# Patient Record
Sex: MALE | Race: WHITE | NOT HISPANIC OR LATINO | Employment: FULL TIME | URBAN - METROPOLITAN AREA
[De-identification: names, ages, dates, MRNs, and addresses within clinical notes are randomized per-mention and may not be internally consistent; named-entity substitution may affect disease eponyms.]

---

## 2018-12-04 ENCOUNTER — OFFICE VISIT CONVERTED (OUTPATIENT)
Dept: FAMILY MEDICINE CLINIC | Facility: CLINIC | Age: 53
End: 2018-12-04
Attending: FAMILY MEDICINE

## 2018-12-04 ENCOUNTER — CONVERSION ENCOUNTER (OUTPATIENT)
Dept: FAMILY MEDICINE CLINIC | Facility: CLINIC | Age: 53
End: 2018-12-04

## 2019-03-05 ENCOUNTER — OFFICE VISIT CONVERTED (OUTPATIENT)
Dept: FAMILY MEDICINE CLINIC | Facility: CLINIC | Age: 54
End: 2019-03-05
Attending: FAMILY MEDICINE

## 2019-03-05 ENCOUNTER — CONVERSION ENCOUNTER (OUTPATIENT)
Dept: FAMILY MEDICINE CLINIC | Facility: CLINIC | Age: 54
End: 2019-03-05

## 2019-09-18 ENCOUNTER — OFFICE VISIT CONVERTED (OUTPATIENT)
Dept: FAMILY MEDICINE CLINIC | Facility: CLINIC | Age: 54
End: 2019-09-18
Attending: FAMILY MEDICINE

## 2019-12-17 ENCOUNTER — OFFICE VISIT CONVERTED (OUTPATIENT)
Dept: FAMILY MEDICINE CLINIC | Facility: CLINIC | Age: 54
End: 2019-12-17
Attending: FAMILY MEDICINE

## 2019-12-18 ENCOUNTER — HOSPITAL ENCOUNTER (OUTPATIENT)
Dept: FAMILY MEDICINE CLINIC | Facility: CLINIC | Age: 54
Discharge: HOME | End: 2019-12-18
Attending: FAMILY MEDICINE

## 2019-12-18 LAB
ALBUMIN SERPL-MCNC: 4.3 G/DL (ref 3.5–5)
ALBUMIN/GLOB SERPL: 1.7 {RATIO} (ref 1.4–2.6)
ALP SERPL-CCNC: 74 U/L (ref 56–119)
ALT SERPL-CCNC: 51 U/L (ref 10–40)
ANION GAP SERPL CALC-SCNC: 19 MMOL/L (ref 8–19)
APPEARANCE UR: CLEAR
AST SERPL-CCNC: 40 U/L (ref 15–50)
BASOPHILS # BLD AUTO: 0.04 10*3/UL (ref 0–0.2)
BASOPHILS NFR BLD AUTO: 0.5 % (ref 0–3)
BILIRUB SERPL-MCNC: 0.46 MG/DL (ref 0.2–1.3)
BILIRUB UR QL: NEGATIVE
BUN SERPL-MCNC: 14 MG/DL (ref 5–25)
BUN/CREAT SERPL: 12 {RATIO} (ref 6–20)
CALCIUM SERPL-MCNC: 9.4 MG/DL (ref 8.7–10.4)
CHLORIDE SERPL-SCNC: 106 MMOL/L (ref 99–111)
CHOLEST SERPL-MCNC: 138 MG/DL (ref 107–200)
CHOLEST/HDLC SERPL: 3 {RATIO} (ref 3–6)
COLOR UR: YELLOW
CONV ABS IMM GRAN: 0.03 10*3/UL (ref 0–0.2)
CONV CO2: 22 MMOL/L (ref 22–32)
CONV COLLECTION SOURCE (UA): NORMAL
CONV IMMATURE GRAN: 0.4 % (ref 0–1.8)
CONV TOTAL PROTEIN: 6.9 G/DL (ref 6.3–8.2)
CONV UROBILINOGEN IN URINE BY AUTOMATED TEST STRIP: 0.2 {EHRLICHU}/DL (ref 0.1–1)
CREAT UR-MCNC: 1.18 MG/DL (ref 0.7–1.2)
DEPRECATED RDW RBC AUTO: 44.7 FL (ref 35.1–43.9)
EOSINOPHIL # BLD AUTO: 0.14 10*3/UL (ref 0–0.7)
EOSINOPHIL # BLD AUTO: 1.8 % (ref 0–7)
ERYTHROCYTE [DISTWIDTH] IN BLOOD BY AUTOMATED COUNT: 13.5 % (ref 11.6–14.4)
EST. AVERAGE GLUCOSE BLD GHB EST-MCNC: 105 MG/DL
GFR SERPLBLD BASED ON 1.73 SQ M-ARVRAT: >60 ML/MIN/{1.73_M2}
GLOBULIN UR ELPH-MCNC: 2.6 G/DL (ref 2–3.5)
GLUCOSE SERPL-MCNC: 87 MG/DL (ref 70–99)
GLUCOSE UR QL: NEGATIVE MG/DL
HBA1C MFR BLD: 5.3 % (ref 3.5–5.7)
HCT VFR BLD AUTO: 44.4 % (ref 42–52)
HDLC SERPL-MCNC: 46 MG/DL (ref 40–60)
HGB BLD-MCNC: 14.9 G/DL (ref 14–18)
HGB UR QL STRIP: NEGATIVE
KETONES UR QL STRIP: NEGATIVE MG/DL
LDLC SERPL CALC-MCNC: 60 MG/DL (ref 70–100)
LEUKOCYTE ESTERASE UR QL STRIP: NEGATIVE
LYMPHOCYTES # BLD AUTO: 1.76 10*3/UL (ref 1–5)
LYMPHOCYTES NFR BLD AUTO: 22.1 % (ref 20–45)
MCH RBC QN AUTO: 30.3 PG (ref 27–31)
MCHC RBC AUTO-ENTMCNC: 33.6 G/DL (ref 33–37)
MCV RBC AUTO: 90.2 FL (ref 80–96)
MONOCYTES # BLD AUTO: 0.69 10*3/UL (ref 0.2–1.2)
MONOCYTES NFR BLD AUTO: 8.6 % (ref 3–10)
NEUTROPHILS # BLD AUTO: 5.32 10*3/UL (ref 2–8)
NEUTROPHILS NFR BLD AUTO: 66.6 % (ref 30–85)
NITRITE UR QL STRIP: NEGATIVE
NRBC CBCN: 0 % (ref 0–0.7)
OSMOLALITY SERPL CALC.SUM OF ELEC: 294 MOSM/KG (ref 273–304)
PH UR STRIP.AUTO: 5.5 [PH] (ref 5–8)
PLATELET # BLD AUTO: 236 10*3/UL (ref 130–400)
PMV BLD AUTO: 8.8 FL (ref 9.4–12.4)
POTASSIUM SERPL-SCNC: 4.5 MMOL/L (ref 3.5–5.3)
PROT UR QL: NEGATIVE MG/DL
PSA SERPL-MCNC: 3.3 NG/ML (ref 0–4)
RBC # BLD AUTO: 4.92 10*6/UL (ref 4.7–6.1)
SODIUM SERPL-SCNC: 142 MMOL/L (ref 135–147)
SP GR UR: 1.02 (ref 1–1.03)
TESTOST SERPL-MCNC: 321 NG/DL (ref 193–740)
TRIGL SERPL-MCNC: 161 MG/DL (ref 40–150)
VLDLC SERPL-MCNC: 32 MG/DL (ref 5–37)
WBC # BLD AUTO: 7.98 10*3/UL (ref 4.8–10.8)

## 2020-01-02 ENCOUNTER — HOSPITAL ENCOUNTER (OUTPATIENT)
Dept: CT IMAGING | Facility: HOSPITAL | Age: 55
Discharge: HOME | End: 2020-01-02
Attending: FAMILY MEDICINE

## 2020-01-15 ENCOUNTER — OFFICE VISIT CONVERTED (OUTPATIENT)
Dept: FAMILY MEDICINE CLINIC | Facility: CLINIC | Age: 55
End: 2020-01-15
Attending: FAMILY MEDICINE

## 2020-02-05 ENCOUNTER — OFFICE VISIT CONVERTED (OUTPATIENT)
Dept: SURGERY | Facility: CLINIC | Age: 55
End: 2020-02-05
Attending: SURGERY

## 2020-02-05 ENCOUNTER — CONVERSION ENCOUNTER (OUTPATIENT)
Dept: SURGERY | Facility: CLINIC | Age: 55
End: 2020-02-05

## 2020-02-14 ENCOUNTER — HOSPITAL ENCOUNTER (OUTPATIENT)
Dept: PREADMISSION TESTING | Facility: HOSPITAL | Age: 55
Discharge: HOME | End: 2020-02-14
Attending: SURGERY

## 2020-02-24 ENCOUNTER — HOSPITAL ENCOUNTER (OUTPATIENT)
Dept: PERIOP | Facility: HOSPITAL | Age: 55
Setting detail: HOSPITAL OUTPATIENT SURGERY
Discharge: HOME | End: 2020-02-24
Attending: SURGERY

## 2020-03-11 ENCOUNTER — CONVERSION ENCOUNTER (OUTPATIENT)
Dept: SURGERY | Facility: CLINIC | Age: 55
End: 2020-03-11

## 2020-03-11 ENCOUNTER — OFFICE VISIT CONVERTED (OUTPATIENT)
Dept: SURGERY | Facility: CLINIC | Age: 55
End: 2020-03-11
Attending: SURGERY

## 2020-04-06 ENCOUNTER — OFFICE VISIT CONVERTED (OUTPATIENT)
Dept: FAMILY MEDICINE CLINIC | Facility: CLINIC | Age: 55
End: 2020-04-06
Attending: FAMILY MEDICINE

## 2020-04-09 ENCOUNTER — HOSPITAL ENCOUNTER (OUTPATIENT)
Dept: GENERAL RADIOLOGY | Facility: HOSPITAL | Age: 55
Discharge: HOME | End: 2020-04-09
Attending: FAMILY MEDICINE

## 2020-04-27 ENCOUNTER — CONVERSION ENCOUNTER (OUTPATIENT)
Dept: FAMILY MEDICINE CLINIC | Facility: CLINIC | Age: 55
End: 2020-04-27

## 2020-04-27 ENCOUNTER — OFFICE VISIT CONVERTED (OUTPATIENT)
Dept: FAMILY MEDICINE CLINIC | Facility: CLINIC | Age: 55
End: 2020-04-27
Attending: FAMILY MEDICINE

## 2020-08-04 ENCOUNTER — OFFICE VISIT CONVERTED (OUTPATIENT)
Dept: FAMILY MEDICINE CLINIC | Facility: CLINIC | Age: 55
End: 2020-08-04
Attending: FAMILY MEDICINE

## 2020-08-05 ENCOUNTER — HOSPITAL ENCOUNTER (OUTPATIENT)
Dept: GENERAL RADIOLOGY | Facility: HOSPITAL | Age: 55
Discharge: HOME | End: 2020-08-05
Attending: FAMILY MEDICINE

## 2020-08-07 ENCOUNTER — HOSPITAL ENCOUNTER (OUTPATIENT)
Dept: FAMILY MEDICINE CLINIC | Facility: CLINIC | Age: 55
Discharge: HOME | End: 2020-08-07
Attending: FAMILY MEDICINE

## 2020-08-07 LAB
APPEARANCE UR: ABNORMAL
BILIRUB UR QL: NEGATIVE
C TRACH RRNA CVX QL NAA+PROBE: NOT DETECTED
COLOR UR: ABNORMAL
CONV BACTERIA: NEGATIVE
CONV COLLECTION SOURCE (UA): ABNORMAL
CONV CRYSTALS: ABNORMAL /[HPF]
CONV HYALINE CASTS IN URINE MICRO: ABNORMAL /[LPF]
CONV UROBILINOGEN IN URINE BY AUTOMATED TEST STRIP: 1 {EHRLICHU}/DL (ref 0.1–1)
GLUCOSE UR QL: NEGATIVE MG/DL
HGB UR QL STRIP: NEGATIVE
KETONES UR QL STRIP: NEGATIVE MG/DL
LEUKOCYTE ESTERASE UR QL STRIP: NEGATIVE
N GONORRHOEA DNA SPEC QL NAA+PROBE: NOT DETECTED
NITRITE UR QL STRIP: NEGATIVE
PH UR STRIP.AUTO: 5.5 [PH] (ref 5–8)
PROT UR QL: NEGATIVE MG/DL
RBC #/AREA URNS HPF: ABNORMAL /[HPF]
SP GR UR: 1.03 (ref 1–1.03)
WBC #/AREA URNS HPF: ABNORMAL /[HPF]

## 2020-08-17 ENCOUNTER — OFFICE VISIT CONVERTED (OUTPATIENT)
Dept: UROLOGY | Facility: CLINIC | Age: 55
End: 2020-08-17
Attending: UROLOGY

## 2020-10-06 ENCOUNTER — CONVERSION ENCOUNTER (OUTPATIENT)
Dept: FAMILY MEDICINE CLINIC | Facility: CLINIC | Age: 55
End: 2020-10-06

## 2020-10-06 ENCOUNTER — OFFICE VISIT CONVERTED (OUTPATIENT)
Dept: FAMILY MEDICINE CLINIC | Facility: CLINIC | Age: 55
End: 2020-10-06
Attending: FAMILY MEDICINE

## 2020-10-14 ENCOUNTER — OFFICE VISIT CONVERTED (OUTPATIENT)
Dept: SURGERY | Facility: CLINIC | Age: 55
End: 2020-10-14
Attending: SURGERY

## 2020-10-28 ENCOUNTER — HOSPITAL ENCOUNTER (OUTPATIENT)
Dept: PREADMISSION TESTING | Facility: HOSPITAL | Age: 55
Discharge: HOME | End: 2020-10-28
Attending: SURGERY

## 2020-10-30 LAB — SARS-COV-2 RNA SPEC QL NAA+PROBE: NOT DETECTED

## 2020-11-02 ENCOUNTER — HOSPITAL ENCOUNTER (OUTPATIENT)
Dept: PERIOP | Facility: HOSPITAL | Age: 55
Setting detail: HOSPITAL OUTPATIENT SURGERY
Discharge: HOME | End: 2020-11-02
Attending: SURGERY

## 2020-12-10 ENCOUNTER — OFFICE VISIT CONVERTED (OUTPATIENT)
Dept: FAMILY MEDICINE CLINIC | Facility: CLINIC | Age: 55
End: 2020-12-10
Attending: FAMILY MEDICINE

## 2021-01-20 ENCOUNTER — OFFICE VISIT CONVERTED (OUTPATIENT)
Dept: FAMILY MEDICINE CLINIC | Facility: CLINIC | Age: 56
End: 2021-01-20
Attending: FAMILY MEDICINE

## 2021-03-10 ENCOUNTER — CONVERSION ENCOUNTER (OUTPATIENT)
Dept: FAMILY MEDICINE CLINIC | Facility: CLINIC | Age: 56
End: 2021-03-10

## 2021-03-10 ENCOUNTER — OFFICE VISIT CONVERTED (OUTPATIENT)
Dept: FAMILY MEDICINE CLINIC | Facility: CLINIC | Age: 56
End: 2021-03-10
Attending: FAMILY MEDICINE

## 2021-04-06 ENCOUNTER — OFFICE VISIT CONVERTED (OUTPATIENT)
Dept: FAMILY MEDICINE CLINIC | Facility: CLINIC | Age: 56
End: 2021-04-06
Attending: FAMILY MEDICINE

## 2021-04-19 ENCOUNTER — TELEMEDICINE CONVERTED (OUTPATIENT)
Dept: PSYCHIATRY | Facility: CLINIC | Age: 56
End: 2021-04-19
Attending: STUDENT IN AN ORGANIZED HEALTH CARE EDUCATION/TRAINING PROGRAM

## 2021-05-10 NOTE — H&P
History and Physical      Patient Name: Mina Romero   Patient ID: [id number]   Sex: Male   YOB: 1965    Primary Care Provider: Kemar Stroud DO   Referring Provider: Kemar Stroud DO    Visit Date: August 17, 2020    Provider: Meng Rodriguez MD   Location[facility]   Location Address: [address] [place] [address]  [id number]   Location Phone: [phone]          Chief Complaint  · pt here for urologic issues      History Of Present Illness     55-year-old obese gentleman here today for bilateral hydrocele    Patient with bilateral hydroceles have gotten larger.  He is mainly bothered by the right side of both sides of bothersome especially when he is at active or riding the lawnmower.    Patient has been having pain in his right suprapubic region ever since hernia repair.  He is also had an increase in size and is bilateral hydrocele since his hernia repair.    8/20 scrotal ultrasoundmoderately large right hydrocele.  Moderately large left hydrocele.  Normal testicles    Voiding without issue.    no gross hematuria.  No history of kidney stone.    No urologic family history,   Has never had any urologic surgery.    2/20 robotic bilateral inguinal hernia repair, Dr. Dale    No cardiopulmonary history.  Patient does not smoke.  Patient does not use blood thinner.  No DM       Past Medical History  Allergic rhinitis; Allergies; Anxiety; Arthritis; Asthma; Colon cancer screening; Depression; Head injury; High blood pressure; High cholesterol; Sinus trouble         Past Surgical History  Inguinal Hernia Repair; Shoulder surgery; Tonsilectomy         Medication List  aspirin 81 mg oral tablet,delayed release (DR/EC); Benadryl Allergy 25 mg oral tablet; buspirone 10 mg oral tablet; fluticasone propionate 50 mcg/actuation nasal spray,suspension; ibuprofen 800 mg oral tablet; methocarbamol 500 mg oral tablet; rosuvastatin 10 mg oral tablet; Singulair 10 mg oral tablet;  temazepam 30 mg oral capsule; urea 20 % topical cream; Zyrtec 10 mg oral tablet         Allergy List  NO KNOWN DRUG ALLERGIES         Family Medical History  Stroke; Heart Disease; Diabetes         Social History  Family History of Substance Use/Abuse; Tobacco (Former)         Immunizations  Name Date Admin   Influenza    [id number]    Shingles    Tdap          Review of Systems  · Constitutional  o Denies  o : chills, fever  · Gastrointestinal  o Denies  o : nausea, vomiting      Vitals  Date Time BP Position Site L\R Cuff Size HR RR TEMP (F) WT  HT  BMI kg/m2 BSA m2 O2 Sat        08/17/2020 08:44 AM       17  316lbs 2oz 6'   42.87 2.7           Physical Examination  · Constitutional  o Appearance  o : Well-appearing, well-developed, in no acute distress  · Respiratory  o Respiratory Effort  o : Unlabored breathing  o Auscultation of Lungs  o : Unlabored breathing, clear to auscultation bilaterally  · Cardiovascular  o Heart  o :   § Auscultation of Heart  § : Regular rate and rhythm, no murmurs  · Gastrointestinal  o Abdominal Examination  o : Nontender, nondistended, no rigidity or guarding, no hepatosplenomegaly  · Genitourinary  o Bladder  o : nonpalpable  o Penis  o : circumcised phallus with no masses or lesions  o Urethral Meatus  o : no inflammation present and no stenosis  o Scrotum and Scrotal Contents  o :   § Testes  § : Bilateral descended testicles no masses or lesions  · Neurologic  o Mental Status Examination  o :   § Orientation  § : Grossly oriented to person, place and time, judgment and insight intact, normal mood and affect       Bilateral scrotum moderately enlarged with some fluid.  No signs of recurrent hernia or pain with palpation               Assessment  · Bilateral hydrocele     603.9/N43.3    Problems Reconciled  Plan  · Medications  o Medications have been Reconciled  o Transition of Care or Provider Policy  · Instructions  o Electronically Identified Patient Education Materials  Provided Electronically  · Referrals  o ID: [id number] Date: 08/13/2020 Type: Inbound  Specialty: Urology       Discussed with the patient risk and benefits of bilateral hydrocelectomy.    Risks and benefits were discussed including bleeding, infection and damage to the urinary system.  We also discussed the risk of anesthesia up to and including death.  Patient voiced understanding       I did discuss with the patient there is no risk of malignant degeneration and if these are not bothering him enough to want to proceed that is okay we can follow this conservatively.    I did discuss with the patient it would be in his best interest to try to lose some weight if he possibly could before surgery.  We did discuss this would be less risk to him as far as his anesthesia risk    I did discuss with the patient that the pain in his right suprapubic region probably would not be fixed with his hydrocelectomy but there is no way to know this.  I did discuss it is possible this is status post his inguinal hernia repair, residual pain    Pt Voiced understanding would like to wait at this time he will let me know if things get worse he wants to proceed with surgery in the future    Greater than 20 minutes was used in counseling and coordination of care, with greater than 51% of this in face-to-face counseling             Electronically Signed by: Meng Rodriguez MD -Author on August 17, 2020 09:13:47 AM

## 2021-05-10 NOTE — H&P
History and Physical      Patient Name: Mina Romero   Patient ID: [id number]   Sex: Male   YOB: 1965    Primary Care Provider: Kemar Stroud DO   Referring Provider: Kemar Stroud DO    Visit Date: October 14, 2020    Provider: Rei Dale MD   Location: [facility]   Location Address: [address] [place] [address]  [id number]   Location Phone: [phone]          Chief Complaint  · Hernia Consult      History Of Present Illness  Mina Romero is a 55 year old /White male who presents to the office today as a consult from Kemar Stroud DO. He presents today for evaluation of an umbilical hernia. I have performed bilateral robotic inguinal hernias on this patient previously and he seems to be doing quite well from that. Since his previous operation, he has now developed a hernia around his bellybutton. He thinks it may be secondary to some increased activity and exercise that he has been doing. He reports that he has a very small reducible hernia that seems to be bothering him more and more over the course of the past three weeks but he is not having any obstructive type symptoms. No overlying skin changes.       Past Medical History  Allergic rhinitis; Allergies; Anxiety; Arthritis; Asthma; Colon cancer screening; Depression; Head injury; High blood pressure; High cholesterol; Sinus trouble         Past Surgical History  Inguinal Hernia Repair; Shoulder surgery; Tonsilectomy         Medication List  aspirin 81 mg oral tablet,delayed release (DR/EC); Benadryl Allergy 25 mg oral tablet; buspirone 10 mg oral tablet; fluticasone propionate 50 mcg/actuation nasal spray,suspension; ibuprofen 800 mg oral tablet; methocarbamol 500 mg oral tablet; rosuvastatin 10 mg oral tablet; Singulair 10 mg oral tablet; temazepam 30 mg oral capsule; urea 20 % topical cream; Zyrtec 10 mg oral tablet         Allergy List  NO KNOWN DRUG ALLERGIES         Family Medical  History  Stroke; Heart Disease; Diabetes         Social History  Family History of Substance Use/Abuse; Tobacco (Former)         Review of Systems  · Constitutional  o Denies  o : chills, excessive sweating, fatigue, fever, sycope/passing out, weight gain, weight loss  · Eyes  o Denies  o : changes in vision, blurry vision, double vision  · HENT  o Denies  o : loss of hearing, ringing in the ears, ear aches, sore throat, nasal congestion, sinus pain, nose bleeds, seasonal allergies  · Cardiovascular  o Denies  o : blood clots, swollen legs, anemia, easy burising or bleeding, transfusions  · Respiratory  o Denies  o : shortness of breath, dry cough, productive cough, pneumonia, COPD  · Gastrointestinal  o Denies  o : difficulty swallowing, reflux  · Genitourinary  o Denies  o : incontinence  · Neurologic  o Denies  o : headache, seizure, stroke, tremor, loss of balance, falls, dizziness/vertigo, difficulty with sleep, numbness/tingling/paresthesia , difficulty with coordination, difficulty with dexterity, weakness  · Musculoskeletal  o Denies  o : neck stiffness/pain, swollen lymph nodes, muscle aches, joint pain, weakness, spasms, sciatica, pain radiating in arm, pain radiating in leg, low back pain  · Endocrine  o Denies  o : diabetes, thyroid disorder  · Psychiatric  o Denies  o : anxiety, depression      Vitals  Date Time BP Position Site L\R Cuff Size HR RR TEMP (F) WT  HT  BMI kg/m2 BSA m2 O2 Sat FR L/min FiO2        10/14/2020 02:42 PM         324lbs 0oz 6'   43.94 2.73             Physical Examination  · Constitutional  o Appearance  o : well developed, well-nourished, alert and in no acute distress  · Head and Face  o Head  o :   § Inspection  § : no deformities or lesions  · Eyes  o Conjunctivae  o : clear  o Sclerae  o : clear  · Neck  o Inspection/Palpation  o : normal appearance, no masses or tenderness, trachea midline  · Respiratory  o Respiratory Effort  o : breathing unlabored  o Inspection of  Chest  o : normal appearance, no retractions  · Cardiovascular  o Heart  o : regular rate and rhythm  · Gastrointestinal  o Abdominal Examination  o : abdomen is soft. Around his umbilicus, he does have a small reducible 1.0 cm umbilical hernia. It is mildly tender to palpation.   · Lymphatic  o Neck  o : no lymphadenopathy present  o Axilla  o : no lymphadenopathy present  o Groin  o : no lymphadenopathy present  · Skin and Subcutaneous Tissue  o General Inspection  o : no rashes present, no lesions present, no areas of discoloration  · Neurologic  o Cranial Nerves  o : grossly intact  o Sensation  o : grossly intact  o Gait and Station  o :   § Gait Screening  § : normal gait, able to stand without diffculty  o Cerebellar Function  o : no obvious abnormalities  · Psychiatric  o Judgement and Insight  o : judgment and insight intact  o Mood and Affect  o : mood normal, affect appropriate          Assessment  · Pre-Surgical Orders     V72.84  · Hernia, Umibilical     553.1  · Pre-op testing     V72.84/Z01.818       Patient with reducible umbilical hernia.       Plan  · Orders  o General Surgery Order (GENOR) - 553.1 - 11/02/2020  o [facility] [place] (32998[place] - V72.84/Z01.818 - 10/28/2020   10/28/20 @3:15pm. [address]  · Medications  o Medications have been Reconciled  o Transition of Care or Provider Policy  · Instructions  o PLAN:   o Handouts Provided-Pre-Procedure Instructions including date and time and location of procedure.  o Surgical Facility: [facility]  o ****Patient Status****  o Outpatient  o ********************  o RISK AND BENEFITS:  o Consent for surgery: Given these options, the patient has verbally expressed an understanding of the risks of surgery and finds these risks acceptable. We will proceed with surgery as soon as possible.  o Consult Anesthesia for any post-operative block, or any pain management procedure deemed necessary by the anestesiologist for adequate  post-operative pain control.   o O.R. PREP: Per protocol  o IV: Per Anesthesia  o IV: LR@ 75ml/hr  o PLEASE SIGN PERMIT FOR: Robotic Umbilical hernia repair  o *__Kefzol 2 gram IV on call to OR.  o Indocyanine Green- 2.5MG IV- On Call To OR  o *___The above History and Physical Examination has been completed within 30 days of admission.  o Pre-Admission Testing Date: Phone Screen   o Electronically Identified Patient Education Materials Provided Electronically  · Referrals  o ID: [id number] Date: 10/12/2020 Type: Inbound  Specialty: General Surgery     I discussed with the patient options for hernia repair including open, laparoscopic, and robotic. I did offer him all three. Seeing as the patient reports he wants to get more involved and more active, I have recommended a minimally invasive hernia repair. He did well with the robot and would like to try that again. Risks, benefits, and alternatives were discussed with the patient extensively. All questions were answered. The patient voiced understanding and agrees to proceed with the above plan.             Electronically Signed by: Celine Morton-, -Author on October 15, 2020 10:55:59 AM  Electronically Co-signed by: Rei Dale MD -Reviewer on October 15, 2020 11:09:30 AM

## 2021-05-11 NOTE — H&P
"   History and Physical      Patient Name: Mina Romero   Patient ID: [id number]   Sex: Male   YOB: 1965    Primary Care Provider: Kemar Stroud DO   Referring Provider: Kemar Stroud DO    Visit Date: April 19, 2021    Provider: Analia Black MD   Location: [facility]   Location Address: [address] [place] [address]  [id number]   Location Phone: [phone]          Chief Complaint     \"Well, I do not notice it myself.  But my sister does.\"       History Of Present Illness  Mina Romero is a 55 year old /White male who presents to the office today referred by Kemar Stroud DO.   Chart review: sent to us for tics. BMI is 45. No recent labs. Labs from December 2019 show elevated triglycerides, normal glucose, normal UA, elevated ALT at 51, AST is 40. Creatinine is normal, CBC, electrolytes are normal. PSA is normal. Testosterone is normal. A1c is 5.3. No outpatient head imaging. No outpatient EKG.   Virtual visit via Zoom audio and video due to the COVID-19 pandemic. Patient is accepting of and agreeable to appointment. The appointment consisted of the patient and I only. Interview: Patient reports that about 5 or 6 years ago his friends and family began to notice that he would repeat, by muttering, his last sentence under his breath. He never notices this himself. As a consequence, it causes him very little distress. He reports that happens 2-3 times a day typically. However, when he is around his sister, she has noted that he did it up to 37 times in a day. His coworkers will pointed out to him if he asks them to do it. Patient would like to do something about this. He has never seen neurology, although he has a history of 3 head injuries/concussions from his [place]. [facility].   Patient notes his anxiety is barely under control. Notes that his tics worsen when he is " "anxious, such as when he is around his sister, who \"stresses me out.\" Does not happen very often at home around his wife.   Denies SI HI AVH. Denies access to weapons. Psychiatric review of systems is positive for anxiety and depression, negative for callie and psychosis.   ...   Past Psychiatric History:  Began Psychiatric Treatment: Many years ago   Dx: Anxiety, depression, PTSD, ADHD as a child   Psychiatrist: [facility]   Therapist: Has done therapy in the past, does not have a therapist right now   : Denies   Admissions History: Denies   Medication Trials: Risperdal was sedating, Lexapro and Wellbutrin each worked for about 3 years and then they stopped working. Also tried clonazepam.   Self-Harm: Denies   Suicide Attempts: Denies   Substance Abuse History:  Types: Denies all, including illicit   Withdrawl Symptoms: Denies   Longest period sober: Not applicable   AA: N/A   Admissions History: Denies   Residential History: Denies   Legal: N/A   Social History:  Marital Status:    Employed: Yes   Employer: Works for the [employer] of the [employer]   Kids: 2 children   House: Lives in a house    Hx: [employer] from    Family History:  Suicide Attempts: Denies   Suicide Completions: Denies   Substance Use: Father with alcohol use disorder, paternal grandfather with alcohol use disorder.   Psychiatric Conditions: His dad had depression and bipolar disorder, his maternal cousin had schizophrenia, his sister has 2 children with Asperger's    depression, psychosis, anxiety: Not applicable   Developmental History:  Born: [place]   Siblings: A sister   Childhood: Some neglect. Patient raised his sister on his own, she was 9 years younger   High School: Completed   College: Bachelors in the [employer]       Past Medical History  Disease Name Date Onset Notes   Allergic rhinitis 2019 --    Allergies --  --    Anxiety --  --    Arthritis --  --    Asthma --  --  "   Colon cancer screening --  5/2017   COVID-19 01/20/2021 --    Depression --  --    Head injury --  x3 in [place]   High blood pressure --  --    High cholesterol --  --    Sinus trouble --  --          Past Surgical History  Procedure Name Date Notes   Inguinal Hernia Repair --  bilateral   Shoulder surgery --  95   Tonsilectomy --  1976         Medication List  Name Date Started Instructions   aspirin 81 mg oral tablet,delayed release (DR/EC) 04/06/2020 take 1 tablet (81 mg) by oral route once daily for 90 days   Benadryl Allergy 25 mg oral tablet 04/06/2020 take 1 tablet by oral route QHS   buspirone 10 mg oral tablet  take 2 tablets (20 mg) by oral route 2 times per day   fluticasone propionate 50 mcg/actuation nasal spray,suspension 04/06/2020 spray 2 sprays (100 mcg) in each nostril by intranasal route once daily for 90 days   ibuprofen 800 mg oral tablet 04/06/2020 take 1 tablet by oral route daily as needed for 30 days   rosuvastatin 10 mg oral tablet  take 1 tablet (10 mg) by oral route once daily   Singulair 10 mg oral tablet 04/06/2020 take 1 tablet (10 mg) by oral route once daily in the evening for 90 days PRN   temazepam 30 mg oral capsule  take 1 capsule (30 mg) by oral route once daily at bedtime as needed   tizanidine 4 mg oral tablet 03/10/2021 take 1 tablet (4 mg) by oral route every 8 hours as needed for back pain/muscle spasms   urea 20 % topical cream 04/06/2020 apply to affected area(s) by topical route 3 times a day for 30 days   Zyrtec 10 mg oral tablet 04/06/2020 take 1 tablet (10 mg) by oral route once daily for 90 days         Allergy List  Allergen Name Date Reaction Notes   NO KNOWN DRUG ALLERGIES --  --  --          Family Medical History  Disease Name Relative/Age Notes   Stroke Father/   --    Heart Disease Father/   aunt/uncle   Diabetes Father/  Mother/   --          Social History  Finding Status Start/Stop Quantity Notes   Family History of Substance Use/Abuse --  --/-- --  --   "  Tobacco Former --/-- --  approximately 2 years         Immunizations  NameDate Admin Mfg Trade Name Lot Number Route Inj VIS Given VIS Publication   Ftvvfdorx73/06/2020 PMC Fluzone Quadrivalent [id number] IM LD 10/06/2020 08/15/2019   Comments: TOLERATED WELL, LEFT STABLE   [id number]/31/2017 NE Not Entered  NE NE 12/06/2018    Comments:    Nzzkktkc92/04/2018 MSD ZOSTAVAX [id number] SC  12/04/2018 02/12/2018   Comments: pt tolerated inj well, left office stable   Tdap05/31/2017 NE Not Entered  NE NE 12/06/2018    Comments:          Review of Systems  · Constitutional  o Admits  o : fatigue  o Denies  o : night sweats  · Eyes  o Denies  o : double vision, blurred vision  · HENT  o Denies  o : vertigo, recent head injury  · Cardiovascular  o Denies  o : chest pain, irregular heart beats  · Respiratory  o Denies  o : shortness of breath, productive cough  · Gastrointestinal  o Denies  o : nausea, vomiting  · Genitourinary  o Denies  o : dysuria, urinary retention  · Integument  o Denies  o : hair growth change, new skin lesions  · Neurologic  o Denies  o : altered mental status, seizures  · Musculoskeletal  o Denies  o : joint swelling, limitation of motion  · Endocrine  o Denies  o : cold intolerance, heat intolerance  · Psychiatric  o Admits  o : anxiety, depression, post traumatic stress disorder  o Denies  o : obsessive compulsive disorder, psychosis, callie  · Allergic-Immunologic  o Denies  o : frequent illnesses      Physical Examination  · Mental Status Exam  o Appearance  o : good eye contact, normal street clothes, groomed, in a car  o Behavior  o : pleasant and cooperative  o Motor  o : no abnormal  o Speech  o : Not a single tic. Normal rhythm, rate, volume, tone, not hyperverbal, not pressured, normal prosidy  o Mood  o : \"Anxiety is barely under control\"  o Affect  o : Slight constriction, slight guarding  o Thought Content  o : negative suicidal ideations, negative homicidal ideations, negative " "obsessions  o Perceptions  o : negative auditory hallucinations, negative visual hallucinations  o Thought Process  o : linear  o Insight/Judgement  o : fair/fair  o Cognition  o : grossly intact  o Attention  o : intact          Assessment  · Anxiety     300.02/F41.1  · Depression (emotion)     311/F32.9  · Tic     307.20/F95.9  · ADHD     314.01/F90.9       Presentation most consistent with possible vocal tic.  Patient sometimes repeats the last phrase of what he stated under his breath.  It is worsened by anxiety, improved when he is not anxious.    It should be noted, he did not do it once with me today.  However, around his sister, he does not often because \"she stresses me out.\"    Patient would rather be off of medications, rather than start a new one.  For this reason will order a psychotherapy referral for habit reversal training involving tics.    See the patient back in 3 months.  If he does need to be on a medication, I would start him on guanfacine as this treats both tics and ADHD.    Consider neurology consult.       Plan  · Orders  o PSYCHOTHERAPY CONSULTATION (PSYTH) - 307.20/F95.9, 311/F32.9, 300.02/F41.1, 314.01/F90.9 - 04/19/2021   [employer], or the [facility]. Their phone number is [phone], and here is a link to the page that specifically talks about tics and the use of HRT: https://InOpen/tic-disorders/  o ACO-39: Current medications updated and reviewed (1159F, ) - - 04/19/2021  · Medications  o Medications have been Reconciled  o Transition of Care or Provider Policy  · Instructions  o Risk Assessment: Risk of self-harm acutely is moderate. Risk factors include anxiety disorder, depressive disorder, recent psychosocial stressors (pandemic). Protective factors include no family history, no present SI, no history of suicide attempts or self-harm in the past, no access to weapons, minimal AODA, healthcare seeking, future orientation, willingness " to engage in care. Risk of self-harm chronically is also moderate, but could be further elevated in the event of treatment noncompliance and/or AODA.  o Safety: No acute safety concerns.  o Medications: No changes. Consider guanfacine in the future as it treats both tics and ADHD.  o Therapy: Referral emailed. Here is the information for the office in [place] that might be able to help your client. It is called [facility], or the [facility]. Their phone number is [phone], and here is a link to the page that specifically talks about tics and the use of HRT: https://[employer]/tic-disorders/  o Follow Up: 3 months.  · Disposition  o Follow Up in 1 month.  · Correspondence  o Behavioral Health Letter to Referring MD (Kemar Stroud DO) - 04/19/2021            Electronically Signed by: Analia Black MD -Author on April 19, 2021 04:03:12 PM

## 2021-05-12 NOTE — PROGRESS NOTES
Progress Note      Patient Name: Mina Romero   Patient ID: [id number]   Sex: Male   YOB: 1965    Primary Care Provider: Kemar Stroud DO   Referring Provider: Kemar Stroud DO    Visit Date: April 6, 2020    Provider: Kemar Stroud DO   Location: [facility]   Location Address: [address] [place] [address], [address]  [id number]   Location Phone: [phone]          Chief Complaint  · LBP      History Of Present Illness  Mina Romero is a 54 year old /White male who presents for evaluation and treatment of:      Patient presents today for an acute visit.  He is still having right-sided lower back pain as well as groin pain with certain positions of movement.  He has a hard time bending forward into the right without experiencing pain in the right hip/groin area.  Denies any testicular pain.  His pain he reports originates from his right low back.  He has not had any imaging on his low back for a number of years.  Denies any significant back injuries but was involved in the .  [facility].  Patient does take baclofen but only seldomly.  He does appear to have a good response when he does take it.  He recently had bilateral inguinal hernia repair.  He has seen physical therapy which has been helping him out with stretches.  Patient does report stretching regularly.       Past Medical History  Allergic rhinitis; Allergies; Anxiety; Arthritis; Asthma; Colon cancer screening; Depression; Head injury; High blood pressure; High cholesterol; Sinus trouble         Past Surgical History  Inguinal Hernia Repair; Shoulder surgery; Tonsilectomy         Medication List  aspirin 81 mg oral tablet,delayed release (DR/EC); Benadryl Allergy 25 mg oral tablet; buspirone 10 mg oral tablet; ibuprofen 800 mg oral tablet; methocarbamol 500 mg oral tablet; rosuvastatin 10 mg oral tablet; Singulair 10 mg oral tablet; temazepam 30 mg oral capsule; urea 20 %  topical cream; Zyrtec 10 mg oral tablet         Allergy List  NO KNOWN DRUG ALLERGIES       Allergies Reconciled  Family Medical History  Stroke; Heart Disease; Diabetes         Social History  Family History of Substance Use/Abuse; Tobacco (Former)         Immunizations  Name Date Admin   Influenza    [id number]    Shingles    Tdap          Review of Systems     Gen: Denies any fever, chills, or weight changes  Extremities: Denies edema  Psychiatric: Denies any changes in mood or affect  Neurologic: Denies any deficits.  Denies any groin numbness or tingling.  Denies any radiation of pain down his legs other than in the hip area bilaterally  Musculoskeletal: As described above  Genitourinary: Denies any scrotal pain.  Denies any urinary incontinence  Skin: Denies any rashes       Vitals  Date Time BP Position Site L\R Cuff Size HR RR TEMP (F) WT  HT  BMI kg/m2 BSA m2 O2 Sat HC       04/06/2020 02:48 /74 Sitting    70 - R  98.2 311lbs 8oz 6'   42.25 2.68 97 %          Physical Examination     General: AAO 3, no acute distress, pleasant  HEENT: Normocephalic, atraumatic  Cardiovascular: Regular rate and rhythm without appreciable murmur  Respiratory: Clear to auscultation bilaterally no RRW  Gastrointestinal: Soft nontender nondistended with bowel sounds present  Musculoskeletal: There is paraspinal hypertonicity of the lumbar spine on the right.  L1-L4 was neutral side bent left rotated right.  Patient was treated with a combination of active and direct as well as passive and indirect treatment with some improvement but no resolution of his symptoms.  extremities: No clubbing, cyanosis or edema  Neurologic: CN II through XII grossly intact   Psychiatric: Normal mood and affect           Assessment  · Low back pain     724.2/M54.5    Problems Reconciled  Plan  · Orders  o ACO-39: Current medications updated and reviewed () - - 04/06/2020  o Lumbar Spine Complete [facility] ([id number]) - 724.2/M54.5 -  04/06/2020  · Medications  o fluticasone propionate 50 mcg/actuation nasal spray,suspension   SIG: spray 2 sprays (100 mcg) in each nostril by intranasal route once daily for 90 days   DISP: (3) 9.9 ml aer w/adap with 3 refills  Prescribed on 04/06/2020     o aspirin 81 mg oral tablet,delayed release (DR/EC)   SIG: take 1 tablet (81 mg) by oral route once daily for 90 days   DISP: (90) tablet with 3 refills  Refilled on 04/06/2020     o Benadryl Allergy 25 mg oral tablet   SIG: take 1 tablet by oral route QHS   DISP: (90) tablet with 3 refills  Refilled on 04/06/2020     o ibuprofen 800 mg oral tablet   SIG: take 1 tablet by oral route daily as needed for 30 days   DISP: (30) tablet with 3 refills  Refilled on 04/06/2020     o Singulair 10 mg oral tablet   SIG: take 1 tablet (10 mg) by oral route once daily in the evening for 90 days PRN   DISP: (90) tablets with 3 refills  Refilled on 04/06/2020     o urea 20 % topical cream   SIG: apply to affected area(s) by topical route 3 times a day for 30 days   DISP: (2) 85 gm tube with 5 refills  Refilled on 04/06/2020     o Zyrtec 10 mg oral tablet   SIG: take 1 tablet (10 mg) by oral route once daily for 90 days   DISP: (90) tablet with 3 refills  Refilled on 04/06/2020     o ipratropium bromide 0.03 % nasal spray,non-aerosol   SIG: spray 2 sprays in each nostril by intranasal route 3 times per day as needed   DISP: (1) 30 ml canister with 1 refills  Discontinued on 04/06/2020     · Instructions  o Patient was educated/instructed on their diagnosis, treatment and medications prior to discharge from the clinic today.  o Call the office with any concerns or questions.  o I suspect that he is experiencing radiation of lumbar low back pain. He has paraspinal hypertonicity of the lumbar spine. Discussed using methocarbamol regularly to see if this improves his symptoms. Discussed with patient importance of stretching regularly. He verbalized understanding and is in agreement  with treatment and management plan. I will get an x-ray of his lumbar spine for further evaluation as well.  · Disposition  o Call or Return if symptoms worsen or persist.  o Keep next appointment            Electronically Signed by: Kemar Stroud DO - on April 6, 2020 05:24:39 PM

## 2021-05-12 NOTE — PROGRESS NOTES
Progress Note      Patient Name: Mina Romero   Patient ID: [id number]   Sex: Male   YOB: 1965    Primary Care Provider: Kemar Stroud DO   Referring Provider: Kemar Stroud DO    Visit Date: April 27, 2020    Provider: Kemar Stroud DO   Location: [facility]   Location Address: [address] [place] [address], [address]  [id number]   Location Phone: [phone]          Chief Complaint  · right bicep pain , maybe strain       History Of Present Illness  Mina Romero is a 54 year old /White male who presents for evaluation and treatment of:      Patient presents today to follow-up for low back pain.  This has improved.  He has been doing some stretches which have been helping.  I did get a lumbar spine x-ray which showed mild degenerative disc disease at L3-L4 and L5.  He is not interested in pursuing any further evaluation at this time as he has been getting better.  He is concerned about his right arm though.  He reports that 3 weeks ago he was doing a lot of gardening and weed eating and pulling.  He reports that after this his right arm started to hurt.  He points over the lateral aspect of the right shoulder as well as anteriorly on the right shoulder over the coracoid process.  He reports that lifting his arm especially above his shoulder is painful.  He has not had any significant issues with his right shoulder otherwise in the past.  He has been taking Motrin as well as icing.       Past Medical History  Allergic rhinitis; Allergies; Anxiety; Arthritis; Asthma; Colon cancer screening; Depression; Head injury; High blood pressure; High cholesterol; Sinus trouble         Past Surgical History  Inguinal Hernia Repair; Shoulder surgery; Tonsilectomy         Medication List  aspirin 81 mg oral tablet,delayed release (DR/EC); Benadryl Allergy 25 mg oral tablet; buspirone 10 mg oral tablet; fluticasone propionate 50 mcg/actuation nasal spray,suspension; ibuprofen 800 mg oral  tablet; methocarbamol 500 mg oral tablet; rosuvastatin 10 mg oral tablet; Singulair 10 mg oral tablet; temazepam 30 mg oral capsule; urea 20 % topical cream; Zyrtec 10 mg oral tablet         Allergy List  NO KNOWN DRUG ALLERGIES         Family Medical History  Stroke; Heart Disease; Diabetes         Social History  Family History of Substance Use/Abuse; Tobacco (Former)         Immunizations  Name Date Admin   Influenza    [id number]    Shingles    Tdap          Review of Systems     Gen: Denies any fever, chills, or weight changes  HEENT: Denies any changes in vision or hearing, denies nasal congestion, denies any neck tenderness or lymphadenopathy  Extremities: Denies edema  Psychiatric: Denies any changes in mood or affect  Neurologic: Denies any deficits  Skin: Denies any rashes  Musculoskeletal: As discussed above       Vitals  Date Time BP Position Site L\R Cuff Size HR RR TEMP (F) WT  HT  BMI kg/m2 BSA m2 O2 Sat HC       04/27/2020 03:12 /72 Sitting    74 - R  98.3 317lbs 0oz 6'   42.99 2.7 96 %          Physical Examination     General: AAO 3, no acute distress, pleasant  HEENT: Normocephalic, atraumatic  Cardiovascular: Regular rate and rhythm without appreciable murmur  Respiratory: Clear to auscultation bilaterally no RRW  Gastrointestinal: Soft nontender nondistended with bowel sounds present  extremities: No clubbing, cyanosis or edema  Neurologic: CN II through XII grossly intact   Psychiatric: Normal mood and affect  Musculoskeletal: Positive speeds test and Yergason's test on the right.  No tenderness over the acromioclavicular joint.  No malpositioning appreciated.  Strength is maintained           Assessment  · Biceps tendinopathy, right     727.9/M67.921      Plan  · Medications  o Medications have been Reconciled  o Transition of Care or Provider Policy  · Instructions  o Handouts were given to patient: biceps tendinopathy  o Patient was educated/instructed on their diagnosis, treatment  and medications prior to discharge from the clinic today.  o Given history and physical examination I suspect biceps tendinopathy. Education on treatment including at home exercises were discussed with patient at length. Patient verbalized understanding and is in agreement with treatment of plan. If symptoms worsen he is instructed to call or return. We may consider physical therapy in the future however we are holding off at this time due to practicing social distancing with coronavirus pandemic. I do not suspect a rupture at this time as his strength is maintained.  o Electronically Identified Patient Education Materials Provided Electronically  · Disposition  o Call or Return if symptoms worsen or persist.  o Keep next appointment            Electronically Signed by: Kemar Stroud DO -Author on April 27, 2020 05:19:25 PM

## 2021-05-13 NOTE — PROGRESS NOTES
Progress Note      Patient Name: Mina Romero   Patient ID: [id number]   Sex: Male   YOB: 1965    Primary Care Provider: Kemar Stroud DO   Referring Provider: Kemar Stroud DO    Visit Date: August 4, 2020    Provider: Kemar Stroud DO   Location: [facility]   Location Address: [address] [place] [address], [address]  [id number]   Location Phone: [phone]          Chief Complaint  · follow up       History Of Present Illness  Mina Romero is a 55 year old /White male who presents for evaluation and treatment of:      Patient presents today for an acute visit.  He had status post bilateral inguinal hernia repair and has been doing well from this but noticed that after surgery he had scrotal swelling.  This has not improved.  He does have some pain in the right testicular area but not on the left.  He was previously told that he had a hydrocele on the left many years ago.  He has not had any surgery for this.  He does regular testicular examinations and has not noticed anything abnormal.  He reports that people of also noticed that he tends to repeat words right after he says them.  He has not been diagnosed with Tourette's.  He recently came across [facility] and is concerned that he may have this.  He does see psychiatry.  Denies any history of autism.       Past Medical History  Allergic rhinitis; Allergies; Anxiety; Arthritis; Asthma; Colon cancer screening; Depression; Head injury; High blood pressure; High cholesterol; Sinus trouble         Past Surgical History  Inguinal Hernia Repair; Shoulder surgery; Tonsilectomy         Medication List  aspirin 81 mg oral tablet,delayed release (DR/EC); Benadryl Allergy 25 mg oral tablet; buspirone 10 mg oral tablet; fluticasone propionate 50 mcg/actuation nasal spray,suspension; ibuprofen 800 mg oral tablet; methocarbamol 500 mg oral tablet; rosuvastatin 10 mg oral tablet; Singulair 10 mg oral tablet; temazepam 30 mg oral  capsule; urea 20 % topical cream; Zyrtec 10 mg oral tablet         Allergy List  NO KNOWN DRUG ALLERGIES         Family Medical History  Stroke; Heart Disease; Diabetes         Social History  Family History of Substance Use/Abuse; Tobacco (Former)         Immunizations  Name Date Admin   Influenza    [id number]    Shingles    Tdap          Review of Systems     General: Denies any fever, chills, weight changes, or night sweats  HEENT:  Denies any vision or hearing changes. Denies any neck tenderness. Denies any headaches. Denies nasal congestion  Cardiovascular: Denies any chest pain or palpitations  respiratory: Denies any cough or wheezing. Denies any shortness of breath  Gastrointestinal: Denies any nausea vomiting or diarrhea, Denies constipation  Extremities: Denies any edema  Psychiatric: As discussed above.  History of anxiety.  Neurologic: Denies any neurologic deficits  skin: Denies any rashes or lesions.  endocrine: Denies any weight loss, fever, night sweats  Musculoskeletal: Denies any weakness  Genitourinary: As discussed above       Vitals  Date Time BP Position Site L\R Cuff Size HR RR TEMP (F) WT  HT  BMI kg/m2 BSA m2 O2 Sat        08/04/2020 02:51 /82 Sitting    64 - R  97.7 312lbs 16oz 6'   42.45 2.69 96 %          Physical Examination     General: AAO 3, no acute distress, pleasant  HEENT: Normocephalic, atraumatic  Cardiovascular: Regular rate and rhythm without appreciable murmur  Respiratory: Clear to auscultation bilaterally no RRW  Gastrointestinal: Soft nontender nondistended with bowel sounds present  Genitourinary: No palpable mass appreciated in the scrotal area bilaterally.  extremities: No clubbing, cyanosis or edema  Neurologic: CN II through XII grossly intact   Psychiatric: Normal mood and affect           Assessment  · Testicular pain, left     608.9/N50.812  · Testicular pain, right     608.9/N50.811  · Hydrocele, unspecified hydrocele type     603.9/N43.3  · Phonic  tic     307.20/F95.8    Problems Reconciled  Plan  · Orders  o Urinalysis with Reflex Microscopy if abnormal ([facility]) ([id number]) - 608.9/N50.812, 608.9/N50.811, 603.9/N43.3 - 08/04/2020  o ACO-39: Current medications updated and reviewed () - - 08/04/2020  o US scrotum and testicle bilateral ([id number]) - 608.9/N50.812, 608.9/N50.811, 603.9/N43.3 - 08/04/2020  o GC/Chlamydia by PCR (Urine or Vaginal Swab) [facility] ([id number]) - 608.9/N50.812, 608.9/N50.811, 603.9/N43.3 - 08/04/2020  · Medications  o Medications have been Reconciled  o Transition of Care or Provider Policy  · Instructions  o Patient was educated/instructed on their diagnosis, treatment and medications prior to discharge from the clinic today.  o Patient instructed to seek medical attention urgently for new or worsening symptoms.  o Call the office with any concerns or questions.  o I will get an ultrasound for further evaluation. He certainly describes what sounds like a phonic tic. He is not aware of it when it is happening. Is not causing that much distress to him except for the fact that people have noticed it. He does have an appointment with psychiatry coming up soon so I encouraged him to discuss this with psychiatry. Discussed with him treatment including cognitive behavioral therapy however he would like to hold off at this time.  o I will check a urinalysis as well as GC and chlamydia. He reports being sexually active and is in a monogamous relationship.  · Disposition  o Follow Up in 2 months.            Electronically Signed by: Kemar Stroud DO -Author on August 4, 2020 05:32:19 PM

## 2021-05-13 NOTE — PROGRESS NOTES
Progress Note      Patient Name: Mina Romero   Patient ID: [id number]   Sex: Male   YOB: 1965    Primary Care Provider: Kemar Stroud DO   Referring Provider: Kemar Stroud DO    Visit Date: December 10, 2020    Provider: Kemar Stroud DO   [place] [facility]   Location Address: [address] [place] [address], [address] [place] [address]  [id number]   Location Phone: [phone]          Chief Complaint  · skin lesion      History Of Present Illness  Mina Romero is a 55 year old /White male who presents for evaluation and treatment of:      Patient presents today for checkup.  He reports overall doing well since having a repair of his umbilical hernia.  He has a lesion on his right forearm that has been present for several years.  He reports it does not cause any itching or irritation.  He was concerned as it has been growing.       Past Medical History  Allergic rhinitis; Allergies; Anxiety; Arthritis; Asthma; Colon cancer screening; Depression; Head injury; High blood pressure; High cholesterol; Sinus trouble         Past Surgical History  Inguinal Hernia Repair; Shoulder surgery; Tonsilectomy         Medication List  aspirin 81 mg oral tablet,delayed release (DR/EC); Benadryl Allergy 25 mg oral tablet; buspirone 10 mg oral tablet; fluticasone propionate 50 mcg/actuation nasal spray,suspension; ibuprofen 800 mg oral tablet; methocarbamol 500 mg oral tablet; rosuvastatin 10 mg oral tablet; Singulair 10 mg oral tablet; temazepam 30 mg oral capsule; urea 20 % topical cream; Zyrtec 10 mg oral tablet         Allergy List  NO KNOWN DRUG ALLERGIES       Allergies Reconciled  Family Medical History  Stroke; Heart Disease; Diabetes         Social History  Family History of Substance Use/Abuse; Tobacco (Former)         Immunizations  Name Date Admin   Influenza 10/06/2020   Influenza 10/01/2019   [id number] 05/31/2017   Shingles 12/04/2018   Tdap 05/31/2017         Review of  Systems     Gen: Denies any fever, chills, or weight changes  Skin: As discussed above       Vitals  Date Time BP Position Site L\R Cuff Size HR RR TEMP (F) WT  HT  BMI kg/m2 BSA m2 O2 Sat FR L/min FiO2 HC       12/10/2020 02:38 /78 Sitting    79 - R  98.6 325lbs 2oz 6'   44.09 2.74 95 %            Physical Examination     General: AAO 3, no acute distress, pleasant  Skin: Hyperkeratotic scaly lesion noted on the right forearm measuring 12 mm x 8 mm.  Appears consistent with a seborrheic keratosis.  Borders are sharply demarcated.  Tan/brown color.           Assessment  · Seborrheic keratosis     702.19/L82.1  I discussed with patient diagnosis of seborrheic keratosis. This is a benign skin condition. If he does have irritation we discussed cryotherapy. He would like to hold off on any treatment for it at this time. If he has any other issues or concerns he is instructed to call or return. I will see him back in 6 months or sooner if needed.      Plan  · Orders  o ACO-39: Current medications updated and reviewed (, 1159F) - - 12/10/2020  o ACO-14: Influenza immunization administered or previously received [facility] () - - 12/10/2020  · Instructions  o Patient was educated/instructed on their diagnosis, treatment and medications prior to discharge from the clinic today.  o Patient instructed to seek medical attention urgently for new or worsening symptoms.  o Call the office with any concerns or questions.  · Disposition  o Follow Up in 6 months.            Electronically Signed by: Kemar Stroud DO -Author on December 10, 2020 05:45:12 PM

## 2021-05-13 NOTE — PROGRESS NOTES
Progress Note      Patient Name: Mina Romero   Patient ID: [id number]   Sex: Male   YOB: 1965    Primary Care Provider: Kemar Stroud DO   Referring Provider: Kemar Stroud DO    Visit Date: October 6, 2020    Provider: Kemar Stroud DO   [place] [facility] [place]   Location Address: [address] [place] [address], [address]  [place], [place]  [id number]   Location Phone: [phone]          Chief Complaint  · check up      History Of Present Illness  Mina Romero is a 55 year old /White male who presents for evaluation and treatment of:      Patient presents today for checkup.  He is interested in receiving the flu vaccine.  It has been brought up by his wife and coworker before the patient will repeat the last sentence that he just sat under his breath afterwards.  He denies any history of motor tic disorder.  No history of other vocal tics.  Denies any history of Tourette's.  I did contact psychiatrist today, Dr. Black regarding patient's case and patient will be seen for further evaluation by psychiatry.  It is not causing any significant issues with patient except for that it is being noticed by other people.  Patient does have lateral epicondylitis bilaterally.  He states that he has had this issue for years and symptoms are well controlled as long as he wears a brace at nighttime.  His wrist do flex down at nighttime.  He does get pain in this area.  Wrist brace has helped in the past.  Patient does have a hernia in the umbilical region that has been present for an unknown period of time.  Denies that it causes any pain.  The bellybutton does stick out and he is able to push it back in.       Past Medical History  Allergic rhinitis; Allergies; Anxiety; Arthritis; Asthma; Colon cancer screening; Depression; Head injury; High blood pressure; High cholesterol; Sinus trouble         Past Surgical History  Inguinal Hernia Repair; Shoulder surgery; Tonsilectomy          Medication List  aspirin 81 mg oral tablet,delayed release (DR/EC); Benadryl Allergy 25 mg oral tablet; buspirone 10 mg oral tablet; fluticasone propionate 50 mcg/actuation nasal spray,suspension; ibuprofen 800 mg oral tablet; methocarbamol 500 mg oral tablet; rosuvastatin 10 mg oral tablet; Singulair 10 mg oral tablet; temazepam 30 mg oral capsule; urea 20 % topical cream; Zyrtec 10 mg oral tablet         Allergy List  NO KNOWN DRUG ALLERGIES       Allergies Reconciled  Family Medical History  Stroke; Heart Disease; Diabetes         Social History  Family History of Substance Use/Abuse; Tobacco (Former)         Immunizations  Name Date Admin   Influenza 10/06/2020   Influenza 10/01/2019   [id number] 05/31/2017   Shingles 12/04/2018   Tdap 05/31/2017         Review of Systems     General: Denies any fever, chills, weight changes, or night sweats  HEENT:  Denies any vision or hearing changes. Denies any neck tenderness. Denies any headaches. Denies nasal congestion  Cardiovascular: Denies any chest pain or palpitations  respiratory: Denies any cough or wheezing. Denies any shortness of breath  Gastrointestinal: Denies any nausea vomiting or diarrhea, Denies constipation.  Otherwise as discussed above  Extremities: Denies any edema  Psychiatric: As discussed above  Neurologic: Denies any neurologic deficits  skin: Denies any rashes or lesions.  endocrine: Denies any weight loss, fever, night sweats  Musculoskeletal: As discussed above       Vitals  Date Time BP Position Site L\R Cuff Size HR RR TEMP (F) WT  HT  BMI kg/m2 BSA m2 O2 Sat FR L/min FiO2 HC       10/06/2020 03:04 /78 Sitting    61 - R  97.1 319lbs 0oz 6'   43.26 2.71 98 %            Physical Examination     General: AAO 3, no acute distress, pleasant  HEENT: Normocephalic, atraumatic  Cardiovascular: Regular rate and rhythm without appreciable murmur  Respiratory: Clear to auscultation bilaterally no RRW  Gastrointestinal: Soft nontender  nondistended with bowel sounds present.  Reducible umbilical hernia.  No evidence of incarceration.  Nontender  Musculoskeletal: Tenderness to palpation over the common extensor tendon near the lateral epicondyle bilaterally in the upper extremities.  extremities: No clubbing, cyanosis or edema  Neurologic: CN II through XII grossly intact   Psychiatric: Normal mood and affect               Assessment  · Need for influenza vaccination     V04.81/Z23  · Lateral epicondylitis of both elbows       Lateral epicondylitis, right elbow     726.32/M77.11  Lateral epicondylitis, left elbow     726.32/M77.12  · Pain in both wrists       Pain in right wrist     719.43/M25.531  Pain in left wrist     719.43/M25.532  · Phonic tic     307.20/F95.8  · Umbilical hernia     553.1/K42.9      Plan  · Orders  o Immunization Admin Fee (Single) ([facility]) (82468) - V04.81/Z23 - 10/06/2020  o Fluzone Quadrivalent Vaccine, age 6 months + (97245) - V04.81/Z23 - 10/06/2020   Vaccine - Influenza; Dose: 0.5; Site: Left Deltoid; Route: Intramuscular; Date: 10/06/2020 15:15:00; Exp: 06/30/2021; Lot: [id number]; Mfg: sanAltruja pasteur; TradeName: Fluzone Quadrivalent; Administered By: Anel Rajan MA; Comment: TOLERATED WELL, LEFT STABLE  o ACO-39: Current medications updated and reviewed (1159F, ) - - 10/06/2020  o ACO-14: Influenza immunization administered or previously received [facility] () - - 10/06/2020   Given today  o PSYCHIATRY CONSULTATION (PSYCH) - 307.20/F95.8 - 10/06/2020   Please refer to Dr. Analia Black locally with [facility]. Case already discussed with him  o General Surgery Consult (GNSUR) - 553.1/K42.9 - 10/06/2020   Please refer to Dr. Dale, whom patient has seen previously  · Instructions  o Patient was educated/instructed on their diagnosis, treatment and medications prior to discharge from the clinic today.  o Patient instructed to seek medical attention urgently for new or worsening symptoms.  o Call the office with any concerns  or questions.  o Discussed referral to psychiatry for evaluation treatment of phonic tic. I will also refer him to general surgery for treatment of umbilical hernia. Patient will call with any questions or concerns. Discussed seeing him back in 1 month or sooner if needed.  o Wrist braces were given to patient today. This has worked well for him in the past for treatment of lateral epicondylitis. If he continues to have symptoms or symptoms worsen he is instructed to call or return sooner.  · Disposition  o Follow Up in 1 month.            Electronically Signed by: Kemar Stroud DO -Author on October 6, 2020 06:15:00 PM

## 2021-05-14 VITALS
BODY MASS INDEX: 42.66 KG/M2 | DIASTOLIC BLOOD PRESSURE: 82 MMHG | HEIGHT: 72 IN | SYSTOLIC BLOOD PRESSURE: 156 MMHG | TEMPERATURE: 98.1 F | WEIGHT: 315 LBS | HEART RATE: 81 BPM | OXYGEN SATURATION: 95 %

## 2021-05-14 VITALS
OXYGEN SATURATION: 95 % | DIASTOLIC BLOOD PRESSURE: 78 MMHG | TEMPERATURE: 96.9 F | WEIGHT: 315 LBS | SYSTOLIC BLOOD PRESSURE: 138 MMHG | BODY MASS INDEX: 42.66 KG/M2 | HEIGHT: 72 IN | HEART RATE: 67 BPM

## 2021-05-14 VITALS
TEMPERATURE: 97.1 F | DIASTOLIC BLOOD PRESSURE: 78 MMHG | OXYGEN SATURATION: 98 % | BODY MASS INDEX: 42.66 KG/M2 | HEART RATE: 61 BPM | HEIGHT: 72 IN | WEIGHT: 315 LBS | SYSTOLIC BLOOD PRESSURE: 134 MMHG

## 2021-05-14 VITALS
HEART RATE: 79 BPM | DIASTOLIC BLOOD PRESSURE: 78 MMHG | SYSTOLIC BLOOD PRESSURE: 132 MMHG | WEIGHT: 315 LBS | OXYGEN SATURATION: 95 % | TEMPERATURE: 98.6 F | HEIGHT: 72 IN | BODY MASS INDEX: 42.66 KG/M2

## 2021-05-14 VITALS — BODY MASS INDEX: 42.66 KG/M2 | WEIGHT: 315 LBS | HEIGHT: 72 IN

## 2021-05-14 VITALS
OXYGEN SATURATION: 96 % | HEIGHT: 72 IN | DIASTOLIC BLOOD PRESSURE: 76 MMHG | WEIGHT: 315 LBS | TEMPERATURE: 97.6 F | HEART RATE: 72 BPM | BODY MASS INDEX: 42.66 KG/M2 | SYSTOLIC BLOOD PRESSURE: 124 MMHG

## 2021-05-14 NOTE — PROGRESS NOTES
Progress Note      Patient Name: Mina Romero   Patient ID: [id number]   Sex: Male   YOB: 1965    Primary Care Provider: Kemar Stroud DO   Referring Provider: Kemar Stroud DO    Visit Date: March 10, 2021    Provider: Kemar Stroud DO   Location: [facility] [place]   Location Address: [address] [place] [address], [address] [place] [address]  [id number]   Location Phone: [phone]          Chief Complaint  · low back pain      History Of Present Illness  Mina Romero is a 55 year old /White male who presents for evaluation and treatment of:      Patient presents today for an acute visit.  He describes low back pain that has had for the better part of a year.  Reviewing records it looks like he had an x-ray done of his lumbar spine back in April 2020 which showed mild reversal of lumbar lordosis and changes of mild degenerative disc disease.  He admits that since surgery which he had back in November his back pain is gotten worse.  He is gaining some weight and states that he cannot stand or walk for more than 5 date minutes without having pain.  If he bends forward he gets better.  He describes tight muscles in his back.  Denies any radiation of pain.  He does have tight hamstring muscles.       Past Medical History  Allergic rhinitis; Allergies; Anxiety; Arthritis; Asthma; Colon cancer screening; COVID-19; Depression; Head injury; High blood pressure; High cholesterol; Sinus trouble         Past Surgical History  Inguinal Hernia Repair; Shoulder surgery; Tonsilectomy         Medication List  aspirin 81 mg oral tablet,delayed release (DR/EC); Benadryl Allergy 25 mg oral tablet; buspirone 10 mg oral tablet; fluticasone propionate 50 mcg/actuation nasal spray,suspension; ibuprofen 800 mg oral tablet; rosuvastatin 10 mg oral tablet; Singulair 10 mg oral tablet; temazepam 30 mg oral capsule; urea 20 % topical cream; Zyrtec 10 mg oral tablet         Allergy List  NO KNOWN DRUG  ALLERGIES         Family Medical History  Stroke; Heart Disease; Diabetes         Social History  Family History of Substance Use/Abuse; Tobacco (Former)         Immunizations  Name Date Admin   Influenza 10/06/2020   Influenza 10/01/2019   [id number] 05/31/2017   Shingles 12/04/2018   Tdap 05/31/2017         Review of Systems     Gen: Denies any fever, chills.  Reports weight gain  Musculoskeletal: As discussed above  Extremities: Denies edema  Psychiatric: Denies any changes in mood or affect  Neurologic: Denies any deficits  Skin: Denies any rashes       Vitals  Date Time BP Position Site L\R Cuff Size HR RR TEMP (F) WT  HT  BMI kg/m2 BSA m2 O2 Sat FR L/min FiO2 HC       03/10/2021 02:14 /78 Sitting    67 - R  96.9 330lbs 6oz 6'   44.81 2.76 95 %            Physical Examination     General: AAO 3, no acute distress, pleasant  HEENT: Normocephalic, atraumatic  Cardiovascular: Regular rate and rhythm without appreciable murmur  Respiratory: Clear to auscultation bilaterally no RRW  Gastrointestinal: Soft nontender nondistended with bowel sounds present  Musculoskeletal: Paraspinal hypertonicity of the lumbar spine with no tenderness to palpation.  Negative straight leg raise bilaterally.  Limited range of motion of his hamstrings bilaterally with passive flexion of the hips.  extremities: No edema  Neurologic: CN II through XII grossly intact   Psychiatric: Normal mood and affect           Assessment  · Low back pain     724.2/M54.5  Discussed with patient stretching exercises as well as working on abdominal musculature strengthening exercises. Stretches specifically will be to his hamstring muscles. I will prescribe her tizanidine which she is instructed to not operate any heavy machinery while taking due to drowsiness side effect.      Plan  · Orders  o ACO-39: Current medications updated and reviewed (, 1159F) - - 03/10/2021  · Medications  o tizanidine 4 mg oral tablet   SIG: take 1 tablet (4 mg) by  oral route every 8 hours as needed for back pain/muscle spasms   DISP: (45) Tablet with 1 refills  Prescribed on 03/10/2021     o amoxicillin 875 mg oral tablet   SIG: take 1 tablet (875 mg) by oral route every 12 hours for 10 days   DISP: (20) Tablet with 0 refills  Discontinued on 03/10/2021     o Medrol (Andreas) 4 mg oral tablets,dose pack   SIG: take by oral route as directed per package instructions for 6 days   DISP: (1) Package with 0 refills  Discontinued on 03/10/2021     o methocarbamol 500 mg oral tablet   SIG: take 2 tablets by oral route once a day (at bedtime)   DISP: (0) tablet with 0 refills  Discontinued on 03/10/2021     o Mucinex 600 mg oral tablet extended release 12hr   SIG: take 1 tablet (600 mg) by oral route every 12 hours as needed for congestion   DISP: (28) Tablet with 0 refills  Discontinued on 03/10/2021     · Instructions  o Patient was educated/instructed on their diagnosis, treatment and medications prior to discharge from the clinic today.  o Patient instructed to seek medical attention urgently for new or worsening symptoms.  o Call the office with any concerns or questions.  · Disposition  o Call or Return if symptoms worsen or persist.  o Keep next appointment            Electronically Signed by: Kemar Stroud DO - on March 10, 2021 02:54:19 PM

## 2021-05-14 NOTE — PROGRESS NOTES
Progress Note      Patient Name: Mina Romero   Patient ID: [id number]   Sex: Male   YOB: 1965    Primary Care Provider: Kemar Stroud DO   Referring Provider: Kemar Stroud DO    Visit Date: January 20, 2021    Provider: Kemar Stroud DO   Location[facility] [place]   Location Address: [address] [place] [address], [address] [place] [address]  [id number]   Location Phone: [phone]          Chief Complaint  · congestion      History Of Present Illness  Mina Romero is a 55 year old /White male who presents for evaluation and treatment of:      Presents for an acute visit.  He reports being diagnosed with Covid on 12/18/2020.  He reports that he had a headache, fatigue, loss of taste and smell.  He reports that the symptoms have all gone away including a low-grade fever however he still having a lot of sinus congestion.  This has persisted for about a month now.  He reports having green-yellow discharge.  He does have facial tenderness over the maxillary sinuses bilaterally.  He denies any more shortness of breath.  He has been taking Sudafed however it wears off rather quickly.  He presents for further evaluation and treatment today.       Past Medical History  Allergic rhinitis; Allergies; Anxiety; Arthritis; Asthma; Colon cancer screening; Depression; Head injury; High blood pressure; High cholesterol; Sinus trouble         Past Surgical History  Inguinal Hernia Repair; Shoulder surgery; Tonsilectomy         Medication List  amoxicillin 875 mg oral tablet; aspirin 81 mg oral tablet,delayed release (DR/EC); Benadryl Allergy 25 mg oral tablet; buspirone 10 mg oral tablet; fluticasone propionate 50 mcg/actuation nasal spray,suspension; ibuprofen 800 mg oral tablet; Medrol (Andreas) 4 mg oral tablets,dose pack; methocarbamol 500 mg oral tablet; Mucinex 600 mg oral tablet extended release 12hr; rosuvastatin 10 mg oral tablet; Singulair 10 mg oral tablet; temazepam 30 mg oral capsule;  urea 20 % topical cream; Zyrtec 10 mg oral tablet         Allergy List  NO KNOWN DRUG ALLERGIES       Allergies Reconciled  Family Medical History  Stroke; Heart Disease; Diabetes         Social History  Family History of Substance Use/Abuse; Tobacco (Former)         Immunizations  Name Date Admin   Influenza 10/06/2020   Influenza 10/01/2019   [id number] 05/31/2017   Shingles 12/04/2018   Tdap 05/31/2017         Review of Systems     Gen: Denies any fever, chills  HEENT: As discussed above  Respiratory: Denies any shortness of breath  Extremities: Denies edema         Vitals  Date Time BP Position Site L\R Cuff Size HR RR TEMP (F) WT  HT  BMI kg/m2 BSA m2 O2 Sat FR L/min FiO2 HC       01/20/2021 08:35 /76 Sitting    72 - R  97.6 328lbs 9oz 6'   44.56 2.75 96 %            Physical Examination     General: AAO 3, congested.  No acute distress  HEENT: Normocephalic, atraumatic, no discharge in the eyes, nasal congestion left worse than right, maxillary sinus tenderness to palpation left worse than right, there is some postnasal drip.  There is some oropharyngeal erythema.  No exudates, no cervical tenderness or lymphadenopathy, TM intact bilaterally with no erythema or effusion  Cardiovascular: Regular rate and rhythm without appreciable murmur  Respiratory: Clear to auscultation bilaterally no RRW  extremities: No edema           Assessment  · Acute non-recurrent maxillary sinusitis     461.0/J01.00  We will give him a Kenalog 40 mg today in office. I will start him on amoxicillin as well as a Medrol Dosepak and Mucinex. Patient instructed to call or return if he has any worsening symptoms or no improvement. Is otherwise recommended to keep his next regular scheduled appointment or return sooner if needed.  · COVID-19     079.89/U07.1      Plan  · Orders  o ACO-14: Influenza immunization administered or previously received [facility] () - - 01/20/2021  o ACO-39: Current medications updated and reviewed (,  1159F) - - 01/20/2021  o 4.00 - Kenalog Injection 40mg (-0) - 461.0/J01.00 - 01/20/2021   Injection - Kenalog 40 mg; Dose: 40 mg; Site: Right Hip; Route: intramuscular; Date: 01/20/2021 09:15:05; Exp: 06/01/2022; Lot: [id number]; Mfg: [employer]; TradeName: triamcinolone; Location: [facility] [place]; Administered By: Keira Sheldon MA; Comment: Patient tolerated injection well. Left in stable condition.  · Medications  o amoxicillin 875 mg oral tablet   SIG: take 1 tablet (875 mg) by oral route every 12 hours for 10 days   DISP: (20) Tablet with 0 refills  Prescribed on 01/20/2021     o Medrol (Andreas) 4 mg oral tablets,dose pack   SIG: take by oral route as directed per package instructions for 6 days   DISP: (1) Package with 0 refills  Prescribed on 01/20/2021     o Mucinex 600 mg oral tablet extended release 12hr   SIG: take 1 tablet (600 mg) by oral route every 12 hours as needed for congestion   DISP: (28) Tablet with 0 refills  Prescribed on 01/20/2021     o Medications have been Reconciled  o Transition of Care or Provider Policy  · Instructions  o Patient was educated/instructed on their diagnosis, treatment and medications prior to discharge from the clinic today.  o Patient instructed to seek medical attention urgently for new or worsening symptoms.  o Call the office with any concerns or questions.  · Disposition  o Call or Return if symptoms worsen or persist.  o Keep next appointment            Electronically Signed by: Kemar Stroud DO -Author on January 20, 2021 09:37:37 AM

## 2021-05-14 NOTE — PROGRESS NOTES
Progress Note      Patient Name: Mina Romero   Patient ID: [id number]   Sex: Male   YOB: 1965    Primary Care Provider: Kemar Stroud DO   Referring Provider: Kemar Stroud DO    Visit Date: April 6, 2021    Provider: Kemar Stroud DO   Location: [facility] [place]   Location Address: [address] [place] [address], [address]  [id number]   Location Phone: [phone]          Chief Complaint  · ingrown toenail      History Of Present Illness  Mina Romero is a 55 year old /White male who presents for evaluation and treatment of:      Patient presents today for evaluation of an ingrown toenail on the lateral aspect of the first digit of the left foot.  It has been bothering him for the past several weeks.  He has previously had issues with ingrown toenails.  He denies any fever chills.  Reports some redness of the skin around where it is ingrown.       Past Medical History  Allergic rhinitis; Allergies; Anxiety; Arthritis; Asthma; Colon cancer screening; COVID-19; Depression; Head injury; High blood pressure; High cholesterol; Sinus trouble         Past Surgical History  Inguinal Hernia Repair; Shoulder surgery; Tonsilectomy         Medication List  aspirin 81 mg oral tablet,delayed release (DR/EC); Benadryl Allergy 25 mg oral tablet; buspirone 10 mg oral tablet; fluticasone propionate 50 mcg/actuation nasal spray,suspension; ibuprofen 800 mg oral tablet; rosuvastatin 10 mg oral tablet; Singulair 10 mg oral tablet; temazepam 30 mg oral capsule; tizanidine 4 mg oral tablet; urea 20 % topical cream; Zyrtec 10 mg oral tablet         Allergy List  NO KNOWN DRUG ALLERGIES       Allergies Reconciled  Family Medical History  Stroke; Heart Disease; Diabetes         Social History  Family History of Substance Use/Abuse; Tobacco (Former)         Immunizations  Name Date Admin   Influenza 10/06/2020   Influenza 10/01/2019   [id number] 05/31/2017   Shingles 12/04/2018   Tdap  05/31/2017         Review of Systems     Gen: Denies any fever, chills, or weight changes  Skin: As discussed above       Vitals  Date Time BP Position Site L\R Cuff Size HR RR TEMP (F) WT  HT  BMI kg/m2 BSA m2 O2 Sat FR L/min FiO2 HC       04/06/2021 01:28 /82 Sitting    81 - R  98.1 335lbs 3oz 6'   45.46 2.78 95 %            Physical Examination     General: AAO 3, no acute distress, pleasant  Skin: Ingrown toenail noted on the lateral aspect of the first digit of the left foot.  Tender to palpation.  No purulence.  There is some erythema.      Consent for partial avulsion of the left great toenail the lateral aspect was discussed with the patient prior to initiating the procedure.  Patient was given opportunity to ask questions.  Patient provided verbal and written consent to treatment. Time out performed.  The ingrown toenail was identified on the left great toe on the lateral aspect.  Area was cleansed with alcohol followed by chlorhexidine.  Then a 25-gauge 1.5 inch needle was used to inject 5 mL of lidocaine without epinephrine using a 3 sided toe block.  Toenail was then wrapped with the tourniquet.  Area was cleansed again with alcohol.  Afterwards a nail spatula was used to undermine the area between the skin and the nail on the lateral aspect.  A cuticle nipper was used to cut the lateral aspect of the toenail.  Then straight Amy forceps were used to remove the ingrown toenail.  This was done in a twisting fashion until the medial aspect of the toenail was removed.  Then phenol was applied.  Afterwards pressure was held and the tourniquet was removed.  Patient had a dressing placed with gauze, Vaseline, and Coband.  Overall patient tolerated procedure well.  There were no immediate complications.           Assessment  · Ingrown toenail of left foot     703.0/L60.0      Plan  · Orders  o ACO-39: Current medications updated and reviewed (, 1159F) - - 04/06/2021  o Avulsion of nail plate (13901)  - 703.0/L60.0 - 04/06/2021  · Instructions  o Patient was educated/instructed on their diagnosis, treatment and medications prior to discharge from the clinic today.  o Patient instructed to seek medical attention urgently for new or worsening symptoms.  o Call the office with any concerns or questions.  o Postprocedural instructions were given to the patient. He is instructed to call with any questions or concerns. I encouraged him to keep his next appointment. He will come in sooner if he has any issues or concerns otherwise.  · Disposition  o Keep next appointment            Electronically Signed by: Kemar Stroud DO -Author on April 6, 2021 05:12:14 PM

## 2021-05-15 VITALS
HEART RATE: 70 BPM | SYSTOLIC BLOOD PRESSURE: 126 MMHG | WEIGHT: 311.5 LBS | HEIGHT: 72 IN | DIASTOLIC BLOOD PRESSURE: 74 MMHG | BODY MASS INDEX: 42.19 KG/M2 | TEMPERATURE: 98.2 F | OXYGEN SATURATION: 97 %

## 2021-05-15 VITALS
BODY MASS INDEX: 40.09 KG/M2 | OXYGEN SATURATION: 96 % | TEMPERATURE: 98.9 F | DIASTOLIC BLOOD PRESSURE: 80 MMHG | HEIGHT: 72 IN | WEIGHT: 296 LBS | HEART RATE: 66 BPM | SYSTOLIC BLOOD PRESSURE: 124 MMHG

## 2021-05-15 VITALS
WEIGHT: 302.12 LBS | OXYGEN SATURATION: 96 % | SYSTOLIC BLOOD PRESSURE: 130 MMHG | HEART RATE: 71 BPM | TEMPERATURE: 98.9 F | DIASTOLIC BLOOD PRESSURE: 58 MMHG | HEIGHT: 72 IN | BODY MASS INDEX: 40.92 KG/M2

## 2021-05-15 VITALS — WEIGHT: 315 LBS | BODY MASS INDEX: 42.66 KG/M2 | HEIGHT: 72 IN | RESPIRATION RATE: 17 BRPM

## 2021-05-15 VITALS
HEART RATE: 67 BPM | TEMPERATURE: 98.6 F | HEIGHT: 72 IN | WEIGHT: 284.5 LBS | BODY MASS INDEX: 38.54 KG/M2 | OXYGEN SATURATION: 95 % | SYSTOLIC BLOOD PRESSURE: 118 MMHG | DIASTOLIC BLOOD PRESSURE: 80 MMHG

## 2021-05-15 VITALS
BODY MASS INDEX: 41.89 KG/M2 | WEIGHT: 309.25 LBS | OXYGEN SATURATION: 97 % | HEIGHT: 72 IN | SYSTOLIC BLOOD PRESSURE: 138 MMHG | HEART RATE: 68 BPM | DIASTOLIC BLOOD PRESSURE: 88 MMHG | TEMPERATURE: 97.7 F

## 2021-05-15 VITALS
HEART RATE: 64 BPM | WEIGHT: 313 LBS | SYSTOLIC BLOOD PRESSURE: 142 MMHG | BODY MASS INDEX: 42.39 KG/M2 | OXYGEN SATURATION: 96 % | TEMPERATURE: 97.7 F | DIASTOLIC BLOOD PRESSURE: 82 MMHG | HEIGHT: 72 IN

## 2021-05-15 VITALS — WEIGHT: 309 LBS | BODY MASS INDEX: 41.85 KG/M2 | HEIGHT: 72 IN | RESPIRATION RATE: 16 BRPM

## 2021-05-15 VITALS — BODY MASS INDEX: 41.31 KG/M2 | RESPIRATION RATE: 16 BRPM | WEIGHT: 305 LBS | HEIGHT: 72 IN

## 2021-05-15 VITALS
HEART RATE: 70 BPM | TEMPERATURE: 97.9 F | HEIGHT: 72 IN | DIASTOLIC BLOOD PRESSURE: 76 MMHG | OXYGEN SATURATION: 95 % | BODY MASS INDEX: 41.21 KG/M2 | SYSTOLIC BLOOD PRESSURE: 124 MMHG | WEIGHT: 304.25 LBS

## 2021-05-15 VITALS
DIASTOLIC BLOOD PRESSURE: 72 MMHG | OXYGEN SATURATION: 96 % | TEMPERATURE: 98.3 F | WEIGHT: 315 LBS | SYSTOLIC BLOOD PRESSURE: 122 MMHG | HEIGHT: 72 IN | HEART RATE: 74 BPM | BODY MASS INDEX: 42.66 KG/M2

## 2021-05-18 ENCOUNTER — OFFICE VISIT CONVERTED (OUTPATIENT)
Dept: FAMILY MEDICINE CLINIC | Facility: CLINIC | Age: 56
End: 2021-05-18
Attending: FAMILY MEDICINE

## 2021-06-05 NOTE — PROGRESS NOTES
Progress Note      Patient Name: Mina Romero   Patient ID: [id number]   Sex: Male   YOB: 1965    Primary Care Provider: Kemar Stroud DO   Referring Provider: Kemar Stroud DO    Visit Date: May 18, 2021    Provider: Kemar Stroud DO   Location: [facility]   Location Address: [address] [place] [address], [address] [place] [address]  [id number]   Location Phone: [phone]          Chief Complaint  · ingrown toe nail      History Of Present Illness  Mina Romero is a 56 year old /White male who presents for evaluation and treatment of:      Patient presents today to discuss an ingrown toenail on the first digit of the right foot.  It has been bothering him for several months now.  He has pain both on the inside and the outside of the first digit of the right foot.  He has had previous issues with ingrown toenails.  Depression screening has been reviewed and it is positive.  He is seeing psychiatry with Jose Carney.  He did see Dr. Black recently for a vocal tic disorder.  He was referred for psychotherapy.  This has to go through primary care so I will make the referral as well.       Past Medical History  ADHD; Allergic rhinitis; Allergies; Anxiety; Arthritis; Asthma; Colon cancer screening; COVID-19; Depression (emotion); Head injury; High blood pressure; High cholesterol; Sinus trouble; Tic         Past Surgical History  Inguinal Hernia Repair; Shoulder surgery; Tonsilectomy         Medication List  aspirin 81 mg oral tablet,delayed release (DR/EC); Benadryl Allergy 25 mg oral tablet; duloxetine 30 mg oral capsule,delayed release(DR/EC); fluticasone propionate 50 mcg/actuation nasal spray,suspension; hydroxyzine HCl 10 mg oral tablet; ibuprofen 800 mg oral tablet; mirtazapine 7.5 mg oral tablet; rosuvastatin 10 mg oral tablet; Singulair 10 mg oral tablet; tizanidine 4 mg oral tablet; urea 20 % topical cream; Zyrtec 10 mg oral tablet         Allergy List  NO KNOWN DRUG  ALLERGIES       Allergies Reconciled  Family Medical History  Stroke; Heart Disease; Diabetes         Social History  Family History of Substance Use/Abuse; Tobacco (Former)         Immunizations  Name Date Admin   Influenza 10/06/2020   Influenza 10/01/2019   [id number] 05/31/2017   Shingles 12/04/2018   Tdap 05/31/2017         Review of Systems     Gen: Denies any fever, chills, or weight changes  Extremities: Denies edema  Psychiatric: As discussed above  Neurologic: Denies any deficits  Skin: Ingrown toenail of the first digit of the right foot       Vitals  Date Time BP Position Site L\R Cuff Size HR RR TEMP (F) WT  HT  BMI kg/m2 BSA m2 O2 Sat FR L/min FiO2 HC       05/18/2021 02:16 /82 Sitting    74 - R  98.5 338lbs 5oz 6'   45.88 2.79 96 %            Physical Examination     General: AAO 3, no acute distress, pleasant  HEENT: Normocephalic, atraumatic  Skin: Ingrown toenail noted on the medial and lateral aspect of the first digit of the right foot.    Consent for partial avulsion of the right great toenail the medial/lateral aspect was discussed with the patient prior to initiating the procedure.  Patient was given opportunity to ask questions.  Patient provided verbal and written consent to treatment. Time out performed.  The ingrown toenail was identified on the right great toe on the medial/lateral aspect.  Area was cleansed with alcohol followed by chlorhexidine.  Then a 25-gauge 1.0 inch needle was used to inject 10 mL of lidocaine without epinephrine using a 3 sided toe block.  Toenail was then wrapped with the tourniquet.  Area was cleansed again with alcohol.  Afterwards a nail spatula was used to undermine the area between the skin and the nail on the medial/lateral aspect.  A cuticle nipper was used to cut the medial/lateral aspect of the toenail.  Then straight Amy forceps were used to remove the ingrown toenail on both the medial and lateral aspect.  This was done in a twisting fashion  until the medial/lateral aspect of the toenail was removed.  Then phenol was applied.  Afterwards pressure was held and the tourniquet was removed.  Patient had a dressing placed with gauze, Vaseline, and Coband.  Overall patient tolerated procedure well.  There were no immediate complications.               Assessment  · Anxiety disorder     300.00/F41.9  · Depression     311/F32.9  · Screening for depression     V79.0/Z13.89  · PTSD (post-traumatic stress disorder)     309.81/F43.10  · Tic     307.20/F95.9  · ADHD     314.01/F90.9  · Ingrown toenail of right foot     703.0/L60.0      Plan  · Orders  o ACO-18: Positive screen for clinical depression using a standardized tool and a follow-up plan documented () - V79.0/Z13.89 - 05/18/2021   score 16  o ACO-39: Current medications updated and reviewed (1159F, ) - - 05/18/2021  o PSYCHOTHERAPY CONSULTATION (EZRA) - 307.20/F95.9 - 05/18/2021   vocal tic. Please refer to [facility] or the [facility] for anxiety and related disorders. Phone is [phone]  o Avulsion of nail plate (70617) - 703.0/L60.0 - 05/18/2021  · Instructions  o Depression Screen completed and scanned into the EMR under the designated folder within the patient's documents.  o Today's PHQ-9 result is _16__  o Patient was educated/instructed on their diagnosis, treatment and medications prior to discharge from the clinic today.  o Patient instructed to seek medical attention urgently for new or worsening symptoms.  o Call the office with any concerns or questions.  o Postprocedural instructions were given to the patient. He is instructed to call with any questions or concerns. Depression screening has been reviewed and it is positive. [facility]. He denies any suicide ideations. Referral has been made for vocal tics.  · Disposition  o Call or Return if symptoms worsen or persist.            Electronically Signed by: Kemar Stroud DO -Author on May 18,  2021 04:27:42 PM

## 2021-06-09 ENCOUNTER — OFFICE VISIT (OUTPATIENT)
Dept: FAMILY MEDICINE CLINIC | Facility: CLINIC | Age: 56
End: 2021-06-09

## 2021-06-09 VITALS
BODY MASS INDEX: 42.66 KG/M2 | DIASTOLIC BLOOD PRESSURE: 72 MMHG | HEIGHT: 72 IN | OXYGEN SATURATION: 96 % | TEMPERATURE: 98.9 F | WEIGHT: 315 LBS | HEART RATE: 69 BPM | SYSTOLIC BLOOD PRESSURE: 118 MMHG

## 2021-06-09 DIAGNOSIS — L60.0 INGROWN TOENAIL OF RIGHT FOOT: Primary | ICD-10-CM

## 2021-06-09 DIAGNOSIS — Z12.11 SCREENING FOR COLON CANCER: ICD-10-CM

## 2021-06-09 DIAGNOSIS — K63.5 POLYP OF COLON, UNSPECIFIED PART OF COLON, UNSPECIFIED TYPE: ICD-10-CM

## 2021-06-09 PROCEDURE — 99213 OFFICE O/P EST LOW 20 MIN: CPT | Performed by: FAMILY MEDICINE

## 2021-06-09 RX ORDER — HYDROCODONE BITARTRATE AND ACETAMINOPHEN 5; 325 MG/1; MG/1
TABLET ORAL
COMMUNITY
End: 2021-06-09

## 2021-06-09 RX ORDER — MIRTAZAPINE 15 MG/1
TABLET, FILM COATED ORAL
COMMUNITY
End: 2021-07-19

## 2021-06-09 RX ORDER — HYDROXYZINE HYDROCHLORIDE 25 MG/1
TABLET, FILM COATED ORAL
COMMUNITY
Start: 2021-05-03 | End: 2021-06-09

## 2021-06-09 RX ORDER — MONTELUKAST SODIUM 10 MG/1
TABLET ORAL
COMMUNITY
Start: 2021-05-14 | End: 2021-07-19

## 2021-06-09 RX ORDER — DULOXETIN HYDROCHLORIDE 60 MG/1
CAPSULE, DELAYED RELEASE ORAL
COMMUNITY
Start: 2021-05-14 | End: 2021-06-09

## 2021-06-09 RX ORDER — MIRTAZAPINE 30 MG/1
TABLET, FILM COATED ORAL
COMMUNITY
Start: 2021-05-13 | End: 2021-06-09

## 2021-06-09 RX ORDER — IBUPROFEN 800 MG/1
800 TABLET ORAL EVERY 6 HOURS PRN
COMMUNITY
Start: 2021-05-17

## 2021-06-09 RX ORDER — TEMAZEPAM 30 MG/1
CAPSULE ORAL
COMMUNITY
End: 2021-06-09

## 2021-06-09 RX ORDER — ROSUVASTATIN CALCIUM 10 MG/1
10 TABLET, COATED ORAL NIGHTLY
COMMUNITY

## 2021-06-09 RX ORDER — METHOCARBAMOL 500 MG/1
500 TABLET, FILM COATED ORAL DAILY
COMMUNITY
End: 2022-05-10

## 2021-06-09 RX ORDER — TIZANIDINE 4 MG/1
TABLET ORAL
COMMUNITY
Start: 2021-05-04 | End: 2021-06-09

## 2021-06-09 RX ORDER — CETIRIZINE HYDROCHLORIDE 10 MG/1
TABLET ORAL
COMMUNITY
Start: 2021-05-14 | End: 2021-06-09

## 2021-06-09 RX ORDER — UREA 200 MG/G
GEL TOPICAL AS NEEDED
COMMUNITY
End: 2021-06-09 | Stop reason: SDUPTHER

## 2021-06-09 RX ORDER — RISPERIDONE 0.5 MG/1
0.5 TABLET ORAL 2 TIMES DAILY PRN
COMMUNITY
Start: 2021-05-13 | End: 2022-08-31

## 2021-06-09 RX ORDER — FLUTICASONE PROPIONATE 50 MCG
2 SPRAY, SUSPENSION (ML) NASAL NIGHTLY
COMMUNITY
Start: 2021-05-14

## 2021-06-09 RX ORDER — FENOFIBRATE 145 MG/1
TABLET, COATED ORAL
COMMUNITY
End: 2021-06-09

## 2021-06-09 RX ORDER — HYDROXYZINE HYDROCHLORIDE 10 MG/1
TABLET, FILM COATED ORAL
COMMUNITY
End: 2022-08-31

## 2021-06-09 RX ORDER — PRAZOSIN HYDROCHLORIDE 2 MG/1
2 CAPSULE ORAL NIGHTLY
COMMUNITY
Start: 2021-05-13

## 2021-06-09 RX ORDER — UREA 200 MG/G
GEL TOPICAL
Qty: 85 G | Refills: 3 | Status: SHIPPED | OUTPATIENT
Start: 2021-06-09 | End: 2022-05-11 | Stop reason: SDUPTHER

## 2021-06-09 RX ORDER — METHOCARBAMOL 750 MG/1
TABLET, FILM COATED ORAL
COMMUNITY
Start: 2021-05-17 | End: 2021-06-09

## 2021-06-09 RX ORDER — DULOXETIN HYDROCHLORIDE 30 MG/1
30 CAPSULE, DELAYED RELEASE ORAL DAILY
COMMUNITY
End: 2021-12-29

## 2021-06-09 RX ORDER — SILDENAFIL 100 MG/1
TABLET, FILM COATED ORAL
COMMUNITY
Start: 2021-05-03

## 2021-06-09 NOTE — PROGRESS NOTES
"Chief Complaint  Requesting colonoscopy    Subjective          Mina Romero presents to [facility]  History of Present Illness  Patient presents today requesting referral for colonoscopy.  His last colonoscopy was in 2015.  He reports he had benign polyps at that time.  He does need a 5-year follow-up which has now been 6 years.  Discussed placing consult order for him.  I recently saw him on 5/18/2021 where he had a partial avulsion of the right great toenail on the medial and lateral aspect.  He is recovering well from this.  Denies any complications from this.  Objective   Vital Signs:   /72   Pulse 69   Temp 98.9 °F (37.2 °C)   Ht 182.9 cm (72\")   Wt (!) 150 kg (330 lb)   SpO2 96%   BMI 44.76 kg/m²     Physical Exam   General: AAO ×3, no acute distress, pleasant  HEENT: Normocephalic, atraumatic  Cardiovascular: Regular rate and rhythm without appreciable murmur  Respiratory: Clear to auscultation bilaterally no RRW  Gastrointestinal: Soft nontender nondistended with bowel sounds present  Skin: Healing ingrown toenail noted on the medial lateral aspect of the right great toe.  No signs of infection.  extremities: No edema  Neurologic: CN II through XII grossly intact   Psychiatric: Normal mood and affect  Result Review :                 Assessment and Plan    Diagnoses and all orders for this visit:    1. Ingrown toenail of right foot (Primary)    2. Polyp of colon, unspecified part of colon, unspecified type  -     Ambulatory Referral For Screening Colonoscopy    3. Screening for colon cancer    Other orders  -     urea (CARMOL) 20 % cream; Apply as needed Twice daily  Dispense: 85 g; Refill: 3    Colonoscopy has been placed.  Patient will follow up as needed.  [facility]  If he does have issues that come up he comes to the clinic to discuss them.    Follow Up   As needed      "

## 2021-07-15 VITALS
TEMPERATURE: 98.5 F | SYSTOLIC BLOOD PRESSURE: 110 MMHG | BODY MASS INDEX: 42.66 KG/M2 | WEIGHT: 315 LBS | DIASTOLIC BLOOD PRESSURE: 82 MMHG | HEIGHT: 72 IN | HEART RATE: 74 BPM | OXYGEN SATURATION: 96 %

## 2021-07-19 ENCOUNTER — TELEMEDICINE (OUTPATIENT)
Dept: PSYCHIATRY | Facility: CLINIC | Age: 56
End: 2021-07-19

## 2021-07-19 DIAGNOSIS — F43.10 POST TRAUMATIC STRESS DISORDER (PTSD): ICD-10-CM

## 2021-07-19 DIAGNOSIS — F90.0 ADHD (ATTENTION DEFICIT HYPERACTIVITY DISORDER), INATTENTIVE TYPE: ICD-10-CM

## 2021-07-19 DIAGNOSIS — F33.40 RECURRENT MAJOR DEPRESSIVE DISORDER IN REMISSION (HCC): ICD-10-CM

## 2021-07-19 DIAGNOSIS — Z86.59 HISTORY OF TICS: ICD-10-CM

## 2021-07-19 DIAGNOSIS — F41.1 GENERALIZED ANXIETY DISORDER: Primary | ICD-10-CM

## 2021-07-19 PROBLEM — Z12.11 COLON CANCER SCREENING: Status: ACTIVE | Noted: 2021-07-19

## 2021-07-19 PROBLEM — F95.9 TIC: Status: ACTIVE | Noted: 2021-07-19

## 2021-07-19 PROBLEM — F41.9 ANXIETY: Status: ACTIVE | Noted: 2021-07-19

## 2021-07-19 PROBLEM — U07.1 COVID-19: Status: ACTIVE | Noted: 2021-01-20

## 2021-07-19 PROBLEM — M19.90 ARTHRITIS: Status: ACTIVE | Noted: 2021-07-19

## 2021-07-19 PROBLEM — J34.9 SINUS TROUBLE: Status: ACTIVE | Noted: 2021-07-19

## 2021-07-19 PROBLEM — F32.A DEPRESSION: Status: ACTIVE | Noted: 2021-07-19

## 2021-07-19 PROBLEM — S09.90XA HEAD INJURY: Status: ACTIVE | Noted: 2021-07-19

## 2021-07-19 PROBLEM — I10 HIGH BLOOD PRESSURE: Status: ACTIVE | Noted: 2021-07-19

## 2021-07-19 PROBLEM — E78.00 HIGH CHOLESTEROL: Status: ACTIVE | Noted: 2021-07-19

## 2021-07-19 PROBLEM — J01.80 OTHER ACUTE SINUSITIS: Status: ACTIVE | Noted: 2021-07-19

## 2021-07-19 PROBLEM — F90.9 ATTENTION DEFICIT DISORDER WITH HYPERACTIVITY: Status: ACTIVE | Noted: 2021-07-19

## 2021-07-19 PROBLEM — J45.909 ASTHMA: Status: ACTIVE | Noted: 2021-07-19

## 2021-07-19 PROBLEM — J30.9 ALLERGIC RHINITIS: Status: ACTIVE | Noted: 2019-03-05

## 2021-07-19 PROCEDURE — 99214 OFFICE O/P EST MOD 30 MIN: CPT | Performed by: STUDENT IN AN ORGANIZED HEALTH CARE EDUCATION/TRAINING PROGRAM

## 2021-07-19 RX ORDER — MIRTAZAPINE 30 MG/1
TABLET, FILM COATED ORAL
COMMUNITY
Start: 2021-07-14

## 2021-07-19 RX ORDER — MONTELUKAST SODIUM 10 MG/1
10 TABLET ORAL NIGHTLY PRN
COMMUNITY
Start: 2021-07-14

## 2021-07-19 RX ORDER — METHOCARBAMOL 750 MG/1
TABLET, FILM COATED ORAL
COMMUNITY
Start: 2021-07-14 | End: 2021-07-19

## 2021-07-19 NOTE — PATIENT INSTRUCTIONS
1.  Please return to clinic at your next scheduled visit.  Contact the clinic ([phone]) at least 24 hours prior in the event you need to cancel.  2.  Do no harm to yourself or others.    3.  Avoid alcohol and drugs.    4.  Take all medications as prescribed.  Please contact the clinic with any concerns. If you are in need of medication refills, please call the clinic at [phone].    5. Should you want to get in touch with your provider, Dr. Analia Black, please utilize [facility] or contact the office ([phone]), and staff will be able to page Dr. Black directly.  6.  In the event you have personal crisis, contact the following crisis numbers: Suicide Prevention Hotline 1-187.776.9063; [facility] Helpline 3-407-425-DBKU; [facility] [phone]; text HELLO to 027391; or 142.     SPECIFIC RECOMMENDATIONS:     1.      Medications discussed at this encounter:                   - Continue meds without changes     2.      Psychotherapy recommendations: Deferred.     3.     Return to clinic: 4 weeks

## 2021-07-19 NOTE — PROGRESS NOTES
"Subjective   Mina Romero is a 56 y.o. male who presents today for follow up    Referring Provider:  No referring provider defined for this encounter.    Chief Complaint:  mdd silvia    History of Present Illness:     Mina Romero is a 55 year old /White male who presents to the office today referred by Kemar Stroud DO.     Chart review: sent to us for tics. BMI is 45. No recent labs. Labs from December 2019 show elevated triglycerides, normal glucose, normal UA, elevated ALT at 51, AST is 40. Creatinine is normal, CBC, electrolytes are normal. PSA is normal. Testosterone is normal. A1c is 5.3. No outpatient head imaging. No outpatient EKG.     Virtual visit via Zoom audio and video due to the COVID-19 pandemic. Patient is accepting of and agreeable to appointment. The appointment consisted of the patient and I only. Interview:   1. No one has mentioned the tic to patient recently. Has not asked his colleagues about it, however.  2. Feels stress related; has \"different\" stresses, which are a little better compared to last visit.  3. Tic center is $300 visit, cash only.  4. Has not seen his sister recently; sees her only twice a year.  5. Tried therapy before, did not have a good rapport with the therapist. Some interest.  6. Wants to meet in person to discuss medication mx.  7. No SI HI AVH.    Previous Notes:  Patient reports that about 5 or 6 years ago his friends and family began to notice that he would repeat, by muttering, his last sentence under his breath. He never notices this himself. As a consequence, it causes him very little distress. He reports that happens 2-3 times a day typically. However, when he is around his sister, she has noted that he did it up to 37 times in a day. His coworkers will pointed out to him if he asks them to do it. Patient would like to do something about this. He has never seen neurology, although he has a history of 3 head injuries/concussions from his service in " [place]. [facility] not [facility].     Denies SI HI AVH.       Past Psychiatric History:  Began Psychiatric Treatment: Many years ago   Dx: Anxiety, depression, PTSD, ADHD as a child   Psychiatrist: [facility]   Therapist: Has done therapy in the past, does not have a therapist right now   : Denies   Admissions History: Denies   Medication Trials: Risperdal was sedating, Lexapro and Wellbutrin each worked for about 3 years and then they stopped working. Also tried clonazepam.   Self-Harm: Denies   Suicide Attempts: Denies     Substance Abuse History:  Types: Denies all, including illicit   Withdrawl Symptoms: Denies   Longest period sober: Not applicable   AA: N/A   Admissions History: Denies   Residential History: Denies   Legal: N/A     Social History:  Marital Status:    Employed: Yes   Employer: Works for the [employer]   Kids: 2 children   House: Lives in a house    Hx: [employer] from      Family History:  Suicide Attempts: Denies   Suicide Completions: Denies   Substance Use: Father with alcohol use disorder, paternal grandfather with alcohol use disorder.   Psychiatric Conditions: His dad had depression and bipolar disorder, his maternal cousin had schizophrenia, his sister has 2 children with Asperger's    depression, psychosis, anxiety: Not applicable     Developmental History:  Born: [place]   Siblings: A sister   Childhood: Some neglect. Patient raised his sister on his own, she was 9 years younger   High School: Completed   College: Bachelors in [employer]           PHQ-9 Depression Screening  PHQ-9 Total Score:      Little interest or pleasure in doing things?     Feeling down, depressed, or hopeless?     Trouble falling or staying asleep, or sleeping too much?     Feeling tired or having little energy?     Poor appetite or overeating?     Feeling bad about yourself - or that  you are a failure or have let yourself or your family down?     Trouble concentrating on things, such as reading the newspaper or watching television?     Moving or speaking so slowly that other people could have noticed? Or the opposite - being so fidgety or restless that you have been moving around a lot more than usual?     Thoughts that you would be better off dead, or of hurting yourself in some way?     PHQ-9 Total Score       KIMBERLY-7       Past Surgical History:  Past Surgical History:   Procedure Laterality Date   • INGUINAL HERNIA REPAIR Bilateral    • SHOULDER SURGERY  1995   • TONSILLECTOMY  1976       Problem List:  Patient Active Problem List   Diagnosis   • Colon cancer screening   • Allergic rhinitis   • Anxiety   • Arthritis   • Asthma   • Attention deficit disorder with hyperactivity   • COVID-19   • Depression   • Head injury   • Sinus trouble   • High blood pressure   • High cholesterol   • Other acute sinusitis   • Tic       Allergy:   No Known Allergies     Discontinued Medications:  Medications Discontinued During This Encounter   Medication Reason   • methocarbamol (ROBAXIN) 750 MG tablet *Therapy completed   • mirtazapine (REMERON) 15 MG tablet *Error   • montelukast (SINGULAIR) 10 MG tablet *Error       Current Medications:   Current Outpatient Medications   Medication Sig Dispense Refill   • DULoxetine (CYMBALTA) 30 MG capsule Take 30 mg by mouth Daily.     • fluticasone (FLONASE) 50 MCG/ACT nasal spray      • hydrOXYzine (ATARAX) 10 MG tablet hydroxyzine HCl 10 mg oral tablet take 1 tablet by oral route daily   Active     • ibuprofen (ADVIL,MOTRIN) 800 MG tablet      • methocarbamol (ROBAXIN) 500 MG tablet methocarbamol 500 mg oral tablet take 2 tablets by oral route once a day (at bedtime)   Suspended     • mirtazapine (REMERON) 30 MG tablet Take 15 mg by mouth Every Night.     • montelukast (SINGULAIR) 10 MG tablet      • prazosin (MINIPRESS) 2 MG capsule      • risperiDONE (risperDAL) 0.5  "MG tablet      • rosuvastatin (CRESTOR) 10 MG tablet rosuvastatin 10 mg oral tablet take 1 tablet (10 mg) by oral route once daily   Active     • sildenafil (VIAGRA) 100 MG tablet      • urea (CARMOL) 20 % cream Apply as needed Twice daily 85 g 3     No current facility-administered medications for this visit.       Past Medical History:  Past Medical History:   Diagnosis Date   • ADHD    • Allergic rhinitis 03/05/2019   • Allergies    • Anxiety    • Arthritis    • Asthma    • COVID-19 01/20/2021   • Emotional depression    • Head injury     X3 IN [place]   • High blood pressure    • High cholesterol    • Sinus trouble    • Tic          Social History     Socioeconomic History   • Marital status:      Spouse name: Not on file   • Number of children: Not on file   • Years of education: Not on file   • Highest education level: Not on file   Tobacco Use   • Smoking status: Former Smoker   • Smokeless tobacco: Never Used   • Tobacco comment: APPROX 2 YEARS AGO    Vaping Use   • Vaping Use: Never used   Substance and Sexual Activity   • Alcohol use: Never   • Drug use: Never   • Sexual activity: Yes         Family History   Problem Relation Age of Onset   • Diabetes Mother    • Stroke Father    • Heart disease Father    • Diabetes Father    • Anxiety disorder Father    • Depression Father    • Heart disease Other         AUNT/UNCLE   • Anxiety disorder Sister      · Mental Status Exam  · Appearance  · : good eye contact, normal street clothes, groomed, in a car  · Behavior  · : pleasant and cooperative  · Motor  · : no abnormal  · Speech  · : Not a single tic. Normal rhythm, rate, volume, tone, not hyperverbal, not pressured, normal prosidy  · Mood  · : \"Anxiety is different\"  · Affect  · : Slight constriction, slight guarding, improved from last visit  · Thought Content  · : negative suicidal ideations, negative homicidal ideations, negative obsessions  · Perceptions  · : negative auditory hallucinations, negative " visual hallucinations  · Thought Process  · : linear  · Insight/Judgement  · : fair/fair  · Cognition  · : grossly intact  · Attention  · : intact    Review of Systems:  Review of Systems   Constitutional: Positive for fatigue. Negative for diaphoresis.   HENT: Negative for drooling.    Eyes: Negative for visual disturbance.   Respiratory: Negative for cough and shortness of breath.    Cardiovascular: Positive for leg swelling. Negative for chest pain and palpitations.   Gastrointestinal: Negative for nausea and vomiting.   Endocrine: Negative for cold intolerance and heat intolerance.   Genitourinary: Negative for difficulty urinating.   Musculoskeletal: Negative for joint swelling.   Allergic/Immunologic: Negative for immunocompromised state.   Neurological: Positive for speech difficulty. Negative for dizziness, seizures and numbness.         Physical Exam:  Physical Exam    Vital Signs:   There were no vitals taken for this visit.     Lab Results:   No visits with results within 6 Month(s) from this visit.   Latest known visit with results is:   No results found for any previous visit.       EKG Results:  No orders to display       Imaging Results:  No Images in the past 120 days found..      Assessment/Plan   Diagnoses and all orders for this visit:    1. Generalized anxiety disorder (Primary)    2. History of tics    3. ADHD (attention deficit hyperactivity disorder), inattentive type    4. Post traumatic stress disorder (PTSD)    5. Recurrent major depressive disorder in remission (CMS/HCC)      Presentation most consistent with above, plus possible vocal tic.  Patient sometimes repeats the last phrase of what he stated under his breath.  It is worsened by anxiety, improved when he is not anxious.      No tics today.  Anxiety is possibly improved.  HRT therapy at the clinic in [place] is too expensive.  Wants to meet in a month in person to discuss medication management.  Also some interest in  psychotherapy.          See the patient back in 1 month.  If he does need to be on a medication, I would start him on guanfacine as this treats both tics and ADHD.    Consider neurology consult.      Visit Diagnoses:    ICD-10-CM ICD-9-CM   1. Generalized anxiety disorder  F41.1 300.02   2. History of tics  Z86.59 V11.8   3. ADHD (attention deficit hyperactivity disorder), inattentive type  F90.0 314.00   4. Post traumatic stress disorder (PTSD)  F43.10 309.81   5. Recurrent major depressive disorder in remission (CMS/[facility])  F33.40 296.35       PLAN:  1. Risk Assessment: Risk of self-harm acutely is moderate. Risk factors include anxiety disorder, depressive disorder, recent psychosocial stressors (pandemic). Protective factors include no family history, no present SI, no history of suicide attempts or self-harm in the past, no access to weapons, minimal AODA, healthcare seeking, future orientation, willingness to engage in care. Risk of self-harm chronically is also moderate, but could be further elevated in the event of treatment noncompliance and/or AODA.  2. Safety: No acute safety concerns.  3. Medications: No changes. Consider guanfacine in the future as it treats both tics and ADHD.  a. Continue duloxetine 30 mg a day, risperidone 0.5 mg a day, mirtazapine 30 mg at night, prazosin 2 mg at night.  4. Therapy: Some interest.  5. Follow Up: 4 weeks in person.      TREATMENT PLAN/GOALS: Continue supportive psychotherapy efforts and medications as indicated. Treatment and medication options discussed during today's visit. Patient acknowledged and verbally consented to continue with current treatment plan and was educated on the importance of compliance with treatment and follow-up appointments.    MEDICATION ISSUES:  YOUSIF reviewed as expected.  Discussed medication options and treatment plan of prescribed medication as well as the risks, benefits, and side effects including potential falls, possible impaired  driving and metabolic adversities among others. Patient is agreeable to call the office with any worsening of symptoms or onset of side effects. Patient is agreeable to call 911 or go to the nearest ER should he/she begin having SI/HI. No medication side effects or related complaints today.     MEDS ORDERED DURING VISIT:  No orders of the defined types were placed in this encounter.      Return in about 4 weeks (around 8/16/2021).         This document has been electronically signed by Analia Black MD  July 19, 2021 15:02 EDT      Part of this note may be an electronic transcription/translation of spoken language to printed text using the Dragon Dictation System.

## 2021-08-13 ENCOUNTER — PREP FOR SURGERY (OUTPATIENT)
Dept: OTHER | Facility: HOSPITAL | Age: 56
End: 2021-08-13

## 2021-08-13 ENCOUNTER — CLINICAL SUPPORT (OUTPATIENT)
Dept: GASTROENTEROLOGY | Facility: CLINIC | Age: 56
End: 2021-08-13

## 2021-08-13 DIAGNOSIS — Z12.11 COLON CANCER SCREENING: Primary | ICD-10-CM

## 2021-08-13 RX ORDER — PEG-3350, SODIUM SULFATE, SODIUM CHLORIDE, POTASSIUM CHLORIDE, SODIUM ASCORBATE AND ASCORBIC ACID 7.5-2.691G
1000 KIT ORAL EVERY 12 HOURS
Qty: 1000 ML | Refills: 0 | Status: SHIPPED | OUTPATIENT
Start: 2021-08-13 | End: 2022-11-11

## 2021-08-13 NOTE — PROGRESS NOTES
SPOKE WITH PT ON A DATE FOR COLONOSCOPY OF 10/14/2021. MADE SURE CHART WAS UP TO DATE MEDS, HISTORY and ALLERGIES WENT OVER MOVIPREP AND MAILED OUT INSTRUCTIONS. PUT IN ORDER FOR COLON AND SENT IN PREP TO PHARMACY.     Mina Romero  REASON FOR CALL- colonoscopy screening  SENT IN PREP moviprep  Past Medical History:   Diagnosis Date   • ADHD    • Allergic rhinitis 03/05/2019   • Allergies    • Anxiety    • Arthritis    • Asthma    • COVID-19 01/20/2021   • Emotional depression    • Head injury     X3 IN [place]   • High blood pressure    • High cholesterol    • Sinus trouble    • Tic      No Known Allergies  Past Surgical History:   Procedure Laterality Date   • INGUINAL HERNIA REPAIR Bilateral    • SHOULDER SURGERY  1995   • TONSILLECTOMY  1976     Social History     Socioeconomic History   • Marital status:      Spouse name: Not on file   • Number of children: Not on file   • Years of education: Not on file   • Highest education level: Not on file   Tobacco Use   • Smoking status: Former Smoker   • Smokeless tobacco: Never Used   • Tobacco comment: APPROX 2 YEARS AGO    Vaping Use   • Vaping Use: Never used   Substance and Sexual Activity   • Alcohol use: Never   • Drug use: Never   • Sexual activity: Yes     Family History   Problem Relation Age of Onset   • Diabetes Mother    • Stroke Father    • Heart disease Father    • Diabetes Father    • Anxiety disorder Father    • Depression Father    • Heart disease Other         AUNT/UNCLE   • Anxiety disorder Sister        Current Outpatient Medications:   •  DULoxetine (CYMBALTA) 30 MG capsule, Take 30 mg by mouth Daily., Disp: , Rfl:   •  fluticasone (FLONASE) 50 MCG/ACT nasal spray, , Disp: , Rfl:   •  hydrOXYzine (ATARAX) 10 MG tablet, hydroxyzine HCl 10 mg oral tablet take 1 tablet by oral route daily   Active, Disp: , Rfl:   •  ibuprofen (ADVIL,MOTRIN) 800 MG tablet, , Disp: , Rfl:   •  methocarbamol (ROBAXIN) 500 MG tablet, methocarbamol 500 mg oral tablet  take 2 tablets by oral route once a day (at bedtime)   Suspended, Disp: , Rfl:   •  mirtazapine (REMERON) 30 MG tablet, Take 15 mg by mouth Every Night., Disp: , Rfl:   •  montelukast (SINGULAIR) 10 MG tablet, , Disp: , Rfl:   •  prazosin (MINIPRESS) 2 MG capsule, , Disp: , Rfl:   •  risperiDONE (risperDAL) 0.5 MG tablet, , Disp: , Rfl:   •  rosuvastatin (CRESTOR) 10 MG tablet, rosuvastatin 10 mg oral tablet take 1 tablet (10 mg) by oral route once daily   Active, Disp: , Rfl:   •  sildenafil (VIAGRA) 100 MG tablet, , Disp: , Rfl:   •  urea (CARMOL) 20 % cream, Apply as needed Twice daily, Disp: 85 g, Rfl: 3

## 2021-08-16 ENCOUNTER — OFFICE VISIT (OUTPATIENT)
Dept: PSYCHIATRY | Facility: CLINIC | Age: 56
End: 2021-08-16

## 2021-08-16 VITALS
BODY MASS INDEX: 41.26 KG/M2 | HEIGHT: 72 IN | DIASTOLIC BLOOD PRESSURE: 75 MMHG | SYSTOLIC BLOOD PRESSURE: 151 MMHG | WEIGHT: 304.6 LBS

## 2021-08-16 DIAGNOSIS — F41.1 GENERALIZED ANXIETY DISORDER: ICD-10-CM

## 2021-08-16 DIAGNOSIS — F90.0 ADHD (ATTENTION DEFICIT HYPERACTIVITY DISORDER), INATTENTIVE TYPE: ICD-10-CM

## 2021-08-16 DIAGNOSIS — F43.10 POST TRAUMATIC STRESS DISORDER (PTSD): ICD-10-CM

## 2021-08-16 DIAGNOSIS — F33.40 RECURRENT MAJOR DEPRESSIVE DISORDER IN REMISSION (HCC): Primary | ICD-10-CM

## 2021-08-16 DIAGNOSIS — Z86.59 HISTORY OF TICS: ICD-10-CM

## 2021-08-16 PROCEDURE — 99214 OFFICE O/P EST MOD 30 MIN: CPT | Performed by: STUDENT IN AN ORGANIZED HEALTH CARE EDUCATION/TRAINING PROGRAM

## 2021-08-16 RX ORDER — GUANFACINE 1 MG/1
1 TABLET, EXTENDED RELEASE ORAL NIGHTLY
Qty: 30 TABLET | Refills: 1 | Status: SHIPPED | OUTPATIENT
Start: 2021-08-16 | End: 2021-10-07 | Stop reason: SDUPTHER

## 2021-08-16 NOTE — PROGRESS NOTES
"Subjective   Mina Romero is a 56 y.o. male who presents today for follow up    Referring Provider:  No referring provider defined for this encounter.    Chief Complaint:  mdd silvia    History of Present Illness:     Mina Romero is a 55 year old /White male who presents to the office today referred by Kemar Stroud DO.     Chart review: sent to us for tics. BMI is 45. No recent labs. Labs from December 2019 show elevated triglycerides, normal glucose, normal UA, elevated ALT at 51, AST is 40. Creatinine is normal, CBC, electrolytes are normal. PSA is normal. Testosterone is normal. A1c is 5.3. No outpatient head imaging.     8/16: In person visit:  1. Review of records: Patient has a colonoscopy coming up in October.  2. EKG in February of last year: Rate 65, sinus, .  3. [facility], but I can treat his tics.  4. Patient reports he does not even know when he has to ask.  Other people simply tell him.  5. Significant depression and anxiety, see the scores below.  6. Denies SI HI AVH.    7/19: Virtual visit via Zoom audio and video due to the COVID-19 pandemic. Patient is accepting of and agreeable to appointment. The appointment consisted of the patient and I only. Interview:   1. No one has mentioned the tic to patient recently. Has not asked his colleagues about it, however.  2. Feels stress related; has \"different\" stresses, which are a little better compared to last visit.  3. Tic center is $300 visit, cash only.  4. Has not seen his sister recently; sees her only twice a year.  5. Tried therapy before, did not have a good rapport with the therapist. Some interest.  6. Wants to meet in person to discuss medication mx.  7. No SI HI AVH.    Previous Notes:  Patient reports that about 5 or 6 years ago his friends and family began to notice that he would repeat, by muttering, his last sentence under his breath. He " never notices this himself. As a consequence, it causes him very little distress. He reports that happens 2-3 times a day typically. However, when he is around his sister, she has noted that he did it up to 37 times in a day. His coworkers will pointed out to him if he asks them to do it. Patient would like to do something about this. He has never seen neurology, although he has a history of 3 head injuries/concussions from his service in [place]. [facility] not [facility].     Denies SI HI AVH.       Past Psychiatric History:  Began Psychiatric Treatment: Many years ago   Dx: Anxiety, depression, PTSD, ADHD as a child   Psychiatrist: [facility]   Therapist: Has done therapy in the past, does not have a therapist right now   : Denies   Admissions History: Denies   Medication Trials: Risperdal was sedating, Lexapro and Wellbutrin each worked for about 3 years and then they stopped working. Also tried clonazepam.   Self-Harm: Denies   Suicide Attempts: Denies     Substance Abuse History:  Types: Denies all, including illicit   Withdrawl Symptoms: Denies   Longest period sober: Not applicable   AA: N/A   Admissions History: Denies   Residential History: Denies   Legal: N/A     Social History:  Marital Status:    Employed: Yes   Employer: Works for [email or web address]   Kids: 2 children   House: Lives in a house    Hx: [email or web address] from      Family History:  Suicide Attempts: Denies   Suicide Completions: Denies   Substance Use: Father with alcohol use disorder, paternal grandfather with alcohol use disorder.   Psychiatric Conditions: His dad had depression and bipolar disorder, his maternal cousin had schizophrenia, his sister has 2 children with Asperger's    depression, psychosis, anxiety: Not applicable     Developmental History:  Born: [place]   Siblings: A sister   Childhood: Some neglect.  Patient raised his sister on his own, she was 9 years younger   High School: Completed   College: Bachelors in [employer]           PHQ-9 Depression Screening  PHQ-9 Total Score: 17    Little interest or pleasure in doing things? 2   Feeling down, depressed, or hopeless? 1   Trouble falling or staying asleep, or sleeping too much? 2   Feeling tired or having little energy? 2   Poor appetite or overeating? 2   Feeling bad about yourself - or that you are a failure or have let yourself or your family down? 1   Trouble concentrating on things, such as reading the newspaper or watching television? 3   Moving or speaking so slowly that other people could have noticed? Or the opposite - being so fidgety or restless that you have been moving around a lot more than usual? 3   Thoughts that you would be better off dead, or of hurting yourself in some way? 1   PHQ-9 Total Score 17     KIMBERLY-7  Feeling nervous, anxious or on edge: Nearly every day  Not being able to stop or control worrying: Nearly every day  Worrying too much about different things: Nearly every day  Trouble Relaxing: Nearly every day  Being so restless that it is hard to sit still: More than half the days  Feeling afraid as if something awful might happen: More than half the days  Becoming easily annoyed or irritable: Nearly every day  KIMBERLY 7 Total Score: 19  If you checked any problems, how difficult have these problems made it for you to do your work, take care of things at home, or get along with other people: Somewhat difficult    Past Surgical History:  Past Surgical History:   Procedure Laterality Date   • INGUINAL HERNIA REPAIR Bilateral    • SHOULDER SURGERY  1995   • TONSILLECTOMY  1976       Problem List:  Patient Active Problem List   Diagnosis   • Colon cancer screening   • Allergic rhinitis   • Anxiety   • Arthritis   • Asthma   • Attention deficit disorder with hyperactivity   • COVID-19   • Depression   • Head injury   • Sinus trouble   • High  blood pressure   • High cholesterol   • Other acute sinusitis   • Tic       Allergy:   No Known Allergies     Discontinued Medications:  There are no discontinued medications.    Current Medications:   Current Outpatient Medications   Medication Sig Dispense Refill   • DULoxetine (CYMBALTA) 30 MG capsule Take 30 mg by mouth Daily.     • fluticasone (FLONASE) 50 MCG/ACT nasal spray      • hydrOXYzine (ATARAX) 10 MG tablet hydroxyzine HCl 10 mg oral tablet take 1 tablet by oral route daily   Active     • ibuprofen (ADVIL,MOTRIN) 800 MG tablet      • methocarbamol (ROBAXIN) 500 MG tablet methocarbamol 500 mg oral tablet take 2 tablets by oral route once a day (at bedtime)   Suspended     • mirtazapine (REMERON) 30 MG tablet Take 15 mg by mouth Every Night.     • montelukast (SINGULAIR) 10 MG tablet      • prazosin (MINIPRESS) 2 MG capsule      • risperiDONE (risperDAL) 0.5 MG tablet      • rosuvastatin (CRESTOR) 10 MG tablet rosuvastatin 10 mg oral tablet take 1 tablet (10 mg) by oral route once daily   Active     • sildenafil (VIAGRA) 100 MG tablet      • urea (CARMOL) 20 % cream Apply as needed Twice daily 85 g 3   • guanFACINE HCl ER (INTUNIV) 1 MG tablet sustained-release 24 hour Take 1 mg by mouth Every Night for 60 days. 30 tablet 1   • PEG-KCl-NaCl-NaSulf-Na Asc-C (MoviPrep) 100 g reconstituted solution powder Take 1,000 mL by mouth Every 12 (Twelve) Hours. 1000 mL 0     No current facility-administered medications for this visit.       Past Medical History:  Past Medical History:   Diagnosis Date   • ADHD    • Allergic rhinitis 03/05/2019   • Allergies    • Anxiety    • Arthritis    • Asthma    • COVID-19 01/20/2021   • Emotional depression    • Head injury     X3 IN [place]   • High blood pressure    • High cholesterol    • Sinus trouble    • Tic          Social History     Socioeconomic History   • Marital status:      Spouse name: Not on file   • Number of children: Not on file   • Years of education:  "Not on file   • Highest education level: Not on file   Tobacco Use   • Smoking status: Former Smoker   • Smokeless tobacco: Never Used   • Tobacco comment: APPROX 2 YEARS AGO    Vaping Use   • Vaping Use: Never used   Substance and Sexual Activity   • Alcohol use: Never   • Drug use: Never   • Sexual activity: Yes         Family History   Problem Relation Age of Onset   • Diabetes Mother    • Stroke Father    • Heart disease Father    • Diabetes Father    • Anxiety disorder Father    • Depression Father    • Heart disease Other         AUNT/UNCLE   • Anxiety disorder Sister      · Mental Status Exam  · Appearance  · : good eye contact, normal street clothes, groomed, in a car  · Behavior  · : pleasant and cooperative  · Motor  · : no abnormal  · Speech  · : Not a single tic. Normal rhythm, rate, volume, tone, not hyperverbal, not pressured, normal prosidy  · Mood  · : \"Anxiety is different\"  · Affect  · : Slight constriction, slight guarding, improved from last visit  · Thought Content  · : negative suicidal ideations, negative homicidal ideations, negative obsessions  · Perceptions  · : negative auditory hallucinations, negative visual hallucinations  · Thought Process  · : linear  · Insight/Judgement  · : fair/fair  · Cognition  · : grossly intact  · Attention  · : intact    Review of Systems:  Review of Systems   Constitutional: Positive for fatigue. Negative for diaphoresis.   HENT: Negative for drooling.    Eyes: Negative for visual disturbance.   Respiratory: Positive for shortness of breath. Negative for cough.    Cardiovascular: Positive for leg swelling. Negative for chest pain and palpitations.   Gastrointestinal: Negative for nausea and vomiting.   Endocrine: Negative for cold intolerance and heat intolerance.   Genitourinary: Negative for difficulty urinating.   Musculoskeletal: Positive for joint swelling.   Allergic/Immunologic: Negative for immunocompromised state.   Neurological: Negative for " "dizziness, seizures, speech difficulty and numbness.         Physical Exam:  Physical Exam    Vital Signs:   /75   Ht 182.9 cm (72\")   Wt (!) 138 kg (304 lb 9.6 oz)   BMI 41.31 kg/m²      Lab Results:   No visits with results within 6 Month(s) from this visit.   Latest known visit with results is:   No results found for any previous visit.       EKG Results:  No orders to display       Imaging Results:  No Images in the past 120 days found..      Assessment/Plan   Diagnoses and all orders for this visit:    1. Recurrent major depressive disorder in remission (CMS/HCC) (Primary)    2. History of tics    3. Generalized anxiety disorder    4. ADHD (attention deficit hyperactivity disorder), inattentive type    5. Post traumatic stress disorder (PTSD)    Other orders  -     guanFACINE HCl ER (INTUNIV) 1 MG tablet sustained-release 24 hour; Take 1 mg by mouth Every Night for 60 days.  Dispense: 30 tablet; Refill: 1      Presentation most consistent with above, plus possible vocal tic.  Patient sometimes repeats the last phrase of what he stated under his breath.  It is worsened by anxiety, improved when he is not anxious.    8/16: [facility] tics, anxiety, ADHD with guanfacine ER.  6 weeks.    7/19: No tics today.  Anxiety is possibly improved.  HRT therapy at the clinic in [place] is too expensive.  Wants to meet in a month in person to discuss medication management.  Also some interest in psychotherapy.  See the patient back in 1 month.  If he does need to be on a medication, I would start him on guanfacine as this treats both tics and ADHD. Consider neurology consult.      Visit Diagnoses:    ICD-10-CM ICD-9-CM   1. Recurrent major depressive disorder in remission (CMS/HCC)  F33.40 296.35   2. History of tics  Z86.59 V11.8   3. Generalized anxiety disorder  F41.1 300.02   4. ADHD (attention deficit hyperactivity disorder), inattentive type  F90.0 314.00   5. Post traumatic stress disorder (PTSD)  F43.10 309.81 "       PLAN:  1. Risk Assessment: Risk of self-harm acutely is moderate. Risk factors include anxiety disorder, depressive disorder, recent psychosocial stressors (pandemic). Protective factors include no family history, no present SI, no history of suicide attempts or self-harm in the past, no access to weapons, minimal AODA, healthcare seeking, future orientation, willingness to engage in care. Risk of self-harm chronically is also moderate, but could be further elevated in the event of treatment noncompliance and/or AODA.  2. Safety: No acute safety concerns.  3. Medications: No changes. Consider guanfacine in the future as it treats both tics and ADHD.  a. CONTINUE duloxetine 30 mg a day, risperidone 0.5 mg a day, mirtazapine 30 mg at night, prazosin 2 mg at night.  b. START guanfacine ER 1 mg PO QHS. Risks, benefits, alternatives discussed with patient and mom including sedation, dizziness, GI upset, dry mouth, constipation, hypotension. After discussion of these risks and benefits, the patient and mom voiced understanding and agreed to proceed.  4. Therapy: Some interest.  5. Follow Up: 6 weeks in person.      TREATMENT PLAN/GOALS: Continue supportive psychotherapy efforts and medications as indicated. Treatment and medication options discussed during today's visit. Patient acknowledged and verbally consented to continue with current treatment plan and was educated on the importance of compliance with treatment and follow-up appointments.    MEDICATION ISSUES:  YOUSIF reviewed as expected.  Discussed medication options and treatment plan of prescribed medication as well as the risks, benefits, and side effects including potential falls, possible impaired driving and metabolic adversities among others. Patient is agreeable to call the office with any worsening of symptoms or onset of side effects. Patient is agreeable to call 911 or go to the nearest ER should he/she begin having SI/HI. No medication side  effects or related complaints today.     MEDS ORDERED DURING VISIT:  New Medications Ordered This Visit   Medications   • guanFACINE HCl ER (INTUNIV) 1 MG tablet sustained-release 24 hour     Sig: Take 1 mg by mouth Every Night for 60 days.     Dispense:  30 tablet     Refill:  1       Return in about 6 weeks (around 9/27/2021).         This document has been electronically signed by Analia Black MD  August 16, 2021 16:13 EDT      Part of this note may be an electronic transcription/translation of spoken language to printed text using the Dragon Dictation System.

## 2021-08-16 NOTE — PROGRESS NOTES
"Subjective   Mina Romero is a 56 y.o. male who presents today for follow up    Referring Provider:  No referring provider defined for this encounter.    Chief Complaint:  mdd silvia    History of Present Illness:     Mina Romero is a 55 year old /White male who presents to the office today referred by Kemar Stroud DO.     Chart review: sent to us for tics. BMI is 45. No recent labs. Labs from December 2019 show elevated triglycerides, normal glucose, normal UA, elevated ALT at 51, AST is 40. Creatinine is normal, CBC, electrolytes are normal. PSA is normal. Testosterone is normal. A1c is 5.3. No outpatient head imaging.     8/16: In person visit:  1. Review of records: Patient has a colonoscopy coming up in October.  2. EKG in February of last year: Rate 65, sinus, .  3.     7/19: Virtual visit via Zoom audio and video due to the COVID-19 pandemic. Patient is accepting of and agreeable to appointment. The appointment consisted of the patient and I only. Interview:   1. No one has mentioned the tic to patient recently. Has not asked his colleagues about it, however.  2. Feels stress related; has \"different\" stresses, which are a little better compared to last visit.  3. Tic center is $300 visit, cash only.  4. Has not seen his sister recently; sees her only twice a year.  5. Tried therapy before, did not have a good rapport with the therapist. Some interest.  6. Wants to meet in person to discuss medication mx.  7. No SI HI AVH.    Previous Notes:  Patient reports that about 5 or 6 years ago his friends and family began to notice that he would repeat, by muttering, his last sentence under his breath. He never notices this himself. As a consequence, it causes him very little distress. He reports that happens 2-3 times a day typically. However, when he is around his sister, she has noted that he did it up to 37 times in a day. His coworkers will pointed out to him if he asks them to do it. Patient " would like to do something about this. He has never seen neurology, although he has a history of 3 head injuries/concussions from his [place]. [facility].     Denies SI HI AVH.       Past Psychiatric History:  Began Psychiatric Treatment: Many years ago   Dx: Anxiety, depression, PTSD, ADHD as a child   [facility]iatrist[facility]   Therapist: Has done therapy in the past, does not have a therapist right now   : Denies   Admissions History: Denies   Medication Trials: Risperdal was sedating, Lexapro and Wellbutrin each worked for about 3 years and then they stopped working. Also tried clonazepam.   Self-Harm: Denies   Suicide Attempts: Denies     Substance Abuse History:  Types: Denies all, including illicit   Withdrawl Symptoms: Denies   Longest period sober: Not applicable   AA: N/A   Admissions History: Denies   Residential History: Denies   Legal: N/A     Social History:  Marital Status:    Employed: Yes   Employer: Works for [employer]   Kids: 2 children   House: Lives in a house    Hx: [employer] from      Family History:  Suicide Attempts: Denies   Suicide Completions: Denies   Substance Use: Father with alcohol use disorder, paternal grandfather with alcohol use disorder.   Psychiatric Conditions: His dad had depression and bipolar disorder, his maternal cousin had schizophrenia, his sister has 2 children with Asperger's    depression, psychosis, anxiety: Not applicable     Developmental History:  Born: [place]   Siblings: A sister   Childhood: Some neglect. Patient raised his sister on his own, she was 9 years younger   High School: Completed   College: Bachelors in [employer]           PHQ-9 Depression Screening  PHQ-9 Total Score: 17    Little interest or pleasure in doing things? 2   Feeling down, depressed, or hopeless? 1   Trouble falling or staying  asleep, or sleeping too much? 2   Feeling tired or having little energy? 2   Poor appetite or overeating? 2   Feeling bad about yourself - or that you are a failure or have let yourself or your family down? 1   Trouble concentrating on things, such as reading the newspaper or watching television? 3   Moving or speaking so slowly that other people could have noticed? Or the opposite - being so fidgety or restless that you have been moving around a lot more than usual? 3   Thoughts that you would be better off dead, or of hurting yourself in some way? 1   PHQ-9 Total Score 17     KIMBERLY-7  Feeling nervous, anxious or on edge: Nearly every day  Not being able to stop or control worrying: Nearly every day  Worrying too much about different things: Nearly every day  Trouble Relaxing: Nearly every day  Being so restless that it is hard to sit still: More than half the days  Feeling afraid as if something awful might happen: More than half the days  Becoming easily annoyed or irritable: Nearly every day  KIMBERLY 7 Total Score: 19  If you checked any problems, how difficult have these problems made it for you to do your work, take care of things at home, or get along with other people: Somewhat difficult    Past Surgical History:  Past Surgical History:   Procedure Laterality Date   • INGUINAL HERNIA REPAIR Bilateral    • SHOULDER SURGERY  1995   • TONSILLECTOMY  1976       Problem List:  Patient Active Problem List   Diagnosis   • Colon cancer screening   • Allergic rhinitis   • Anxiety   • Arthritis   • Asthma   • Attention deficit disorder with hyperactivity   • COVID-19   • Depression   • Head injury   • Sinus trouble   • High blood pressure   • High cholesterol   • Other acute sinusitis   • Tic       Allergy:   No Known Allergies     Discontinued Medications:  There are no discontinued medications.    Current Medications:   Current Outpatient Medications   Medication Sig Dispense Refill   • DULoxetine (CYMBALTA) 30 MG capsule  Take 30 mg by mouth Daily.     • fluticasone (FLONASE) 50 MCG/ACT nasal spray      • hydrOXYzine (ATARAX) 10 MG tablet hydroxyzine HCl 10 mg oral tablet take 1 tablet by oral route daily   Active     • ibuprofen (ADVIL,MOTRIN) 800 MG tablet      • methocarbamol (ROBAXIN) 500 MG tablet methocarbamol 500 mg oral tablet take 2 tablets by oral route once a day (at bedtime)   Suspended     • mirtazapine (REMERON) 30 MG tablet Take 15 mg by mouth Every Night.     • montelukast (SINGULAIR) 10 MG tablet      • prazosin (MINIPRESS) 2 MG capsule      • risperiDONE (risperDAL) 0.5 MG tablet      • rosuvastatin (CRESTOR) 10 MG tablet rosuvastatin 10 mg oral tablet take 1 tablet (10 mg) by oral route once daily   Active     • sildenafil (VIAGRA) 100 MG tablet      • urea (CARMOL) 20 % cream Apply as needed Twice daily 85 g 3   • PEG-KCl-NaCl-NaSulf-Na Asc-C (MoviPrep) 100 g reconstituted solution powder Take 1,000 mL by mouth Every 12 (Twelve) Hours. 1000 mL 0     No current facility-administered medications for this visit.       Past Medical History:  Past Medical History:   Diagnosis Date   • ADHD    • Allergic rhinitis 03/05/2019   • Allergies    • Anxiety    • Arthritis    • Asthma    • COVID-19 01/20/2021   • Emotional depression    • Head injury     X3 IN [place]   • High blood pressure    • High cholesterol    • Sinus trouble    • Tic          Social History     Socioeconomic History   • Marital status:      Spouse name: Not on file   • Number of children: Not on file   • Years of education: Not on file   • Highest education level: Not on file   Tobacco Use   • Smoking status: Former Smoker   • Smokeless tobacco: Never Used   • Tobacco comment: APPROX 2 YEARS AGO    Vaping Use   • Vaping Use: Never used   Substance and Sexual Activity   • Alcohol use: Never   • Drug use: Never   • Sexual activity: Yes         Family History   Problem Relation Age of Onset   • Diabetes Mother    • Stroke Father    • Heart disease  "Father    • Diabetes Father    • Anxiety disorder Father    • Depression Father    • Heart disease Other         AUNT/UNCLE   • Anxiety disorder Sister      · Mental Status Exam  · Appearance  · : good eye contact, normal street clothes, groomed, in a car  · Behavior  · : pleasant and cooperative  · Motor  · : no abnormal  · Speech  · : Not a single tic. Normal rhythm, rate, volume, tone, not hyperverbal, not pressured, normal prosidy  · Mood  · : \"Anxiety is different\"  · Affect  · : Slight constriction, slight guarding, improved from last visit  · Thought Content  · : negative suicidal ideations, negative homicidal ideations, negative obsessions  · Perceptions  · : negative auditory hallucinations, negative visual hallucinations  · Thought Process  · : linear  · Insight/Judgement  · : fair/fair  · Cognition  · : grossly intact  · Attention  · : intact    Review of Systems:  Review of Systems   Constitutional: Positive for fatigue. Negative for diaphoresis.   HENT: Negative for drooling.    Eyes: Negative for visual disturbance.   Respiratory: Positive for shortness of breath. Negative for cough.    Cardiovascular: Positive for leg swelling. Negative for chest pain and palpitations.   Gastrointestinal: Negative for nausea and vomiting.   Endocrine: Negative for cold intolerance and heat intolerance.   Genitourinary: Negative for difficulty urinating.   Musculoskeletal: Positive for joint swelling.   Allergic/Immunologic: Negative for immunocompromised state.   Neurological: Negative for dizziness, seizures, speech difficulty and numbness.         Physical Exam:  Physical Exam    Vital Signs:   /75   Ht 182.9 cm (72\")   Wt (!) 138 kg (304 lb 9.6 oz)   BMI 41.31 kg/m²      Lab Results:   No visits with results within 6 Month(s) from this visit.   Latest known visit with results is:   No results found for any previous visit.       EKG Results:  No orders to display       Imaging Results:  No Images in the " past 120 days found..      Assessment/Plan   Diagnoses and all orders for this visit:    1. Recurrent major depressive disorder in remission (CMS/HCC) (Primary)    2. History of tics    3. Generalized anxiety disorder    4. ADHD (attention deficit hyperactivity disorder), inattentive type    5. Post traumatic stress disorder (PTSD)      Presentation most consistent with above, plus possible vocal tic.  Patient sometimes repeats the last phrase of what he stated under his breath.  It is worsened by anxiety, improved when he is not anxious.    8/16:     7/19: No tics today.  Anxiety is possibly improved.  HRT therapy at the clinic in [place] is too expensive.  Wants to meet in a month in person to discuss medication management.  Also some interest in psychotherapy.  See the patient back in 1 month.  If he does need to be on a medication, I would start him on guanfacine as this treats both tics and ADHD. Consider neurology consult.      Visit Diagnoses:    ICD-10-CM ICD-9-CM   1. Recurrent major depressive disorder in remission (CMS/HCC)  F33.40 296.35   2. History of tics  Z86.59 V11.8   3. Generalized anxiety disorder  F41.1 300.02   4. ADHD (attention deficit hyperactivity disorder), inattentive type  F90.0 314.00   5. Post traumatic stress disorder (PTSD)  F43.10 309.81       PLAN:  1. Risk Assessment: Risk of self-harm acutely is moderate. Risk factors include anxiety disorder, depressive disorder, recent psychosocial stressors (pandemic). Protective factors include no family history, no present SI, no history of suicide attempts or self-harm in the past, no access to weapons, minimal AODA, healthcare seeking, future orientation, willingness to engage in care. Risk of self-harm chronically is also moderate, but could be further elevated in the event of treatment noncompliance and/or AODA.  2. Safety: No acute safety concerns.  3. Medications: No changes. Consider guanfacine in the future as it treats both tics  and ADHD.  a. Continue duloxetine 30 mg a day, risperidone 0.5 mg a day, mirtazapine 30 mg at night, prazosin 2 mg at night.  b. START guanfacine ER 1 mg PO QHS. Risks, benefits, alternatives discussed with patient and mom including sedation, dizziness, GI upset, dry mouth, constipation, hypotension. After discussion of these risks and benefits, the patient and mom voiced understanding and agreed to proceed.  4. Therapy: Some interest.  5. Follow Up: 6 weeks in person.      TREATMENT PLAN/GOALS: Continue supportive psychotherapy efforts and medications as indicated. Treatment and medication options discussed during today's visit. Patient acknowledged and verbally consented to continue with current treatment plan and was educated on the importance of compliance with treatment and follow-up appointments.    MEDICATION ISSUES:  YOUSIF reviewed as expected.  Discussed medication options and treatment plan of prescribed medication as well as the risks, benefits, and side effects including potential falls, possible impaired driving and metabolic adversities among others. Patient is agreeable to call the office with any worsening of symptoms or onset of side effects. Patient is agreeable to call 911 or go to the nearest ER should he/she begin having SI/HI. No medication side effects or related complaints today.     MEDS ORDERED DURING VISIT:  No orders of the defined types were placed in this encounter.      Return in about 6 weeks (around 9/27/2021).         This document has been electronically signed by Analia Black MD  August 16, 2021 16:05 EDT      Part of this note may be an electronic transcription/translation of spoken language to printed text using the Dragon Dictation System.

## 2021-08-16 NOTE — PATIENT INSTRUCTIONS
1.  Please return to clinic at your next scheduled visit.  Contact the clinic ([phone]) at least 24 hours prior in the event you need to cancel.  2.  Do no harm to yourself or others.    3.  Avoid alcohol and drugs.    4.  Take all medications as prescribed.  Please contact the clinic with any concerns. If you are in need of medication refills, please call the clinic at [phone].    5. Should you want to get in touch with your provider, Dr. Analia Black, please utilize [employer] or contact the office ([phone]), and staff will be able to page Dr. Black directly.  6.  In the event you have personal crisis, contact the following crisis numbers: Suicide Prevention Hotline 1-520.982.3092; [facility] Helpline 9-417-030-LRBJ; [facility] [phone]; text HELLO to 879149; or 233.     SPECIFIC RECOMMENDATIONS:     1.      Medications discussed at this encounter:                   - Start guanfacine     2.      Psychotherapy recommendations: Deferred     3.     Return to clinic: 6 weeks

## 2021-09-13 ENCOUNTER — OFFICE VISIT (OUTPATIENT)
Dept: FAMILY MEDICINE CLINIC | Facility: CLINIC | Age: 56
End: 2021-09-13

## 2021-09-13 VITALS
TEMPERATURE: 97.9 F | SYSTOLIC BLOOD PRESSURE: 124 MMHG | OXYGEN SATURATION: 97 % | BODY MASS INDEX: 40.66 KG/M2 | HEIGHT: 72 IN | DIASTOLIC BLOOD PRESSURE: 82 MMHG | WEIGHT: 300.2 LBS | HEART RATE: 57 BPM

## 2021-09-13 DIAGNOSIS — M25.551 BILATERAL HIP PAIN: Primary | ICD-10-CM

## 2021-09-13 DIAGNOSIS — M62.89 MUSCLE TIGHTNESS: ICD-10-CM

## 2021-09-13 DIAGNOSIS — M76.891 ADDUCTOR TENDINITIS OF BOTH HIPS: ICD-10-CM

## 2021-09-13 DIAGNOSIS — M76.892 ADDUCTOR TENDINITIS OF BOTH HIPS: ICD-10-CM

## 2021-09-13 DIAGNOSIS — M25.552 BILATERAL HIP PAIN: Primary | ICD-10-CM

## 2021-09-13 PROCEDURE — 99213 OFFICE O/P EST LOW 20 MIN: CPT | Performed by: FAMILY MEDICINE

## 2021-09-13 RX ORDER — DULOXETIN HYDROCHLORIDE 60 MG/1
60 CAPSULE, DELAYED RELEASE ORAL DAILY
COMMUNITY
Start: 2021-08-16

## 2021-09-13 RX ORDER — CETIRIZINE HYDROCHLORIDE 10 MG/1
10 TABLET ORAL
COMMUNITY
Start: 2021-08-16

## 2021-09-13 NOTE — PROGRESS NOTES
"Chief Complaint  Bilateral hip pain    Subjective          Mina Romero presents to [facility]  History of Present Illness  Patient presents today with an acute visit.  He has been experiencing bilateral hip pain on and off for at least the last couple years.  I previously evaluated this for him including getting a MRI of his right hip back in 2019.  This showed a normal MRI of the right hip and pelvis.  He previously reports a stress fracture of the right hip.  He does have pain on the inside of his upper thighs and the abductor muscle area.  This appears to be more problem on the right side than left.  He does have pain when he flexes at the hip on the right side but not as much on the left.  He points to the pain anteriorly.  Objective   Vital Signs:   /82   Pulse 57   Temp 97.9 °F (36.6 °C)   Ht 182.9 cm (72\")   Wt (!) 136 kg (300 lb 3.2 oz)   SpO2 97%   BMI 40.71 kg/m²     Physical Exam   General: AAO ×3, no acute distress, pleasant  HEENT: Normocephalic, atraumatic  Musculoskeletal: Positive Sekou's test on the right.  Negative on the left.  Patient has tight adductors noted during exam with abduction of the hips bilaterally.  Result Review :                 Assessment and Plan    Diagnoses and all orders for this visit:    1. Bilateral hip pain (Primary)  -     XR Hips Bilateral With or Without Pelvis 2 View; Future  -     Ambulatory Referral to Physical Therapy Evaluate and treat; Stretching    2. Muscle tightness  -     Ambulatory Referral to Physical Therapy Evaluate and treat; Stretching    3. Adductor tendinitis of both hips  -     Ambulatory Referral to Physical Therapy Evaluate and treat; Stretching    Discussed getting x-ray of the hips bilaterally.  I will set him up for physical therapy.  He does have some muscle tightness including what I suspect is abductor tendinitis.  He will benefit from physical therapy.  Further recommendations to follow once " x-ray returns.  There is a possibility of some arthritis in his right hip.    Follow Up   Return in about 2 months (around 11/13/2021) for bilateral hip pain.  Patient was given instructions and counseling regarding his condition or for health maintenance advice. Please see specific information pulled into the AVS if appropriate.

## 2021-09-17 ENCOUNTER — HOSPITAL ENCOUNTER (OUTPATIENT)
Dept: GENERAL RADIOLOGY | Facility: HOSPITAL | Age: 56
Discharge: HOME | End: 2021-09-17
Admitting: FAMILY MEDICINE

## 2021-09-17 DIAGNOSIS — M25.552 BILATERAL HIP PAIN: ICD-10-CM

## 2021-09-17 DIAGNOSIS — M25.551 BILATERAL HIP PAIN: ICD-10-CM

## 2021-09-17 PROCEDURE — 73521 X-RAY EXAM HIPS BI 2 VIEWS: CPT

## 2021-10-07 ENCOUNTER — OFFICE VISIT (OUTPATIENT)
Dept: PSYCHIATRY | Facility: CLINIC | Age: 56
End: 2021-10-07

## 2021-10-07 VITALS
WEIGHT: 300.6 LBS | DIASTOLIC BLOOD PRESSURE: 67 MMHG | BODY MASS INDEX: 40.71 KG/M2 | HEIGHT: 72 IN | SYSTOLIC BLOOD PRESSURE: 127 MMHG

## 2021-10-07 DIAGNOSIS — Z86.59 HISTORY OF TICS: ICD-10-CM

## 2021-10-07 DIAGNOSIS — F43.10 POST TRAUMATIC STRESS DISORDER (PTSD): ICD-10-CM

## 2021-10-07 DIAGNOSIS — F41.1 GENERALIZED ANXIETY DISORDER: ICD-10-CM

## 2021-10-07 DIAGNOSIS — F33.40 RECURRENT MAJOR DEPRESSIVE DISORDER IN REMISSION (HCC): Primary | ICD-10-CM

## 2021-10-07 DIAGNOSIS — F90.0 ADHD (ATTENTION DEFICIT HYPERACTIVITY DISORDER), INATTENTIVE TYPE: ICD-10-CM

## 2021-10-07 PROCEDURE — 99214 OFFICE O/P EST MOD 30 MIN: CPT | Performed by: STUDENT IN AN ORGANIZED HEALTH CARE EDUCATION/TRAINING PROGRAM

## 2021-10-07 RX ORDER — GUANFACINE 1 MG/1
2 TABLET, EXTENDED RELEASE ORAL NIGHTLY
Qty: 60 TABLET | Refills: 1 | Status: SHIPPED | OUTPATIENT
Start: 2021-10-07 | End: 2023-01-13

## 2021-10-07 NOTE — PATIENT INSTRUCTIONS
1.  Please return to clinic at your next scheduled visit.  Contact the clinic ([phone]) at least 24 hours prior in the event you need to cancel.  2.  Do no harm to yourself or others.    3.  Avoid alcohol and drugs.    4.  Take all medications as prescribed.  Please contact the clinic with any concerns. If you are in need of medication refills, please call the clinic at [phone].    5. Should you want to get in touch with your provider, Dr. Analia Black, please utilize [employer] or contact the office ([phone]), and staff will be able to page Dr. Black directly.  6.  In the event you have personal crisis, contact the following crisis numbers: Suicide Prevention Hotline 1-403.108.1032; [facility] Helpline 1-414-639-DMEW; [facility] Emergency Room [phone]; text HELLO to 726832; or 951.     SPECIFIC RECOMMENDATIONS:     1.      Medications discussed at this encounter:                   - increase guanfacine     2.      Psychotherapy recommendations:      3.     Return to clinic: 8 weeks

## 2021-10-14 ENCOUNTER — ANESTHESIA EVENT (OUTPATIENT)
Dept: GASTROENTEROLOGY | Facility: HOSPITAL | Age: 56
End: 2021-10-14

## 2021-10-14 ENCOUNTER — ANESTHESIA (OUTPATIENT)
Dept: GASTROENTEROLOGY | Facility: HOSPITAL | Age: 56
End: 2021-10-14

## 2021-10-14 ENCOUNTER — HOSPITAL ENCOUNTER (OUTPATIENT)
Facility: HOSPITAL | Age: 56
Setting detail: HOSPITAL OUTPATIENT SURGERY
Discharge: HOME | End: 2021-10-14
Attending: INTERNAL MEDICINE | Admitting: INTERNAL MEDICINE

## 2021-10-14 VITALS
RESPIRATION RATE: 20 BRPM | TEMPERATURE: 97.9 F | DIASTOLIC BLOOD PRESSURE: 75 MMHG | SYSTOLIC BLOOD PRESSURE: 109 MMHG | WEIGHT: 297.18 LBS | HEART RATE: 61 BPM | BODY MASS INDEX: 40.3 KG/M2 | OXYGEN SATURATION: 96 %

## 2021-10-14 PROCEDURE — 25010000002 PROPOFOL 10 MG/ML EMULSION: Performed by: NURSE ANESTHETIST, CERTIFIED REGISTERED

## 2021-10-14 PROCEDURE — 45378 DIAGNOSTIC COLONOSCOPY: CPT | Performed by: INTERNAL MEDICINE

## 2021-10-14 RX ORDER — LIDOCAINE HYDROCHLORIDE 20 MG/ML
INJECTION, SOLUTION INFILTRATION; PERINEURAL AS NEEDED
Status: DISCONTINUED | OUTPATIENT
Start: 2021-10-14 | End: 2021-10-14 | Stop reason: SURG

## 2021-10-14 RX ORDER — SODIUM CHLORIDE, SODIUM LACTATE, POTASSIUM CHLORIDE, CALCIUM CHLORIDE 600; 310; 30; 20 MG/100ML; MG/100ML; MG/100ML; MG/100ML
30 INJECTION, SOLUTION INTRAVENOUS CONTINUOUS
Status: DISCONTINUED | OUTPATIENT
Start: 2021-10-14 | End: 2021-10-14 | Stop reason: HOSPADM

## 2021-10-14 RX ORDER — PROPOFOL 10 MG/ML
VIAL (ML) INTRAVENOUS AS NEEDED
Status: DISCONTINUED | OUTPATIENT
Start: 2021-10-14 | End: 2021-10-14 | Stop reason: SURG

## 2021-10-14 RX ADMIN — LIDOCAINE HYDROCHLORIDE 50 MG: 20 INJECTION, SOLUTION INFILTRATION; PERINEURAL at 10:43

## 2021-10-14 RX ADMIN — PROPOFOL 140 MG: 10 INJECTION, EMULSION INTRAVENOUS at 10:43

## 2021-10-14 RX ADMIN — PROPOFOL 150 MCG/KG/MIN: 10 INJECTION, EMULSION INTRAVENOUS at 10:43

## 2021-10-14 RX ADMIN — SODIUM CHLORIDE, POTASSIUM CHLORIDE, SODIUM LACTATE AND CALCIUM CHLORIDE 30 ML/HR: 600; 310; 30; 20 INJECTION, SOLUTION INTRAVENOUS at 08:41

## 2021-10-14 NOTE — ANESTHESIA PREPROCEDURE EVALUATION
Anesthesia Evaluation     Patient summary reviewed and Nursing notes reviewed   no history of anesthetic complications:  NPO Solid Status: > 8 hours  NPO Liquid Status: > 2 hours           Airway   Mallampati: IV  TM distance: >3 FB  Neck ROM: full  Difficult intubation highly probable  Dental - normal exam     Pulmonary - normal exam   (+) a smoker Former, asthma (well-controlled),    ROS comment: Hx of COVID 19  Cardiovascular - normal exam  Exercise tolerance: good (4-7 METS)    ECG reviewed    (+) hypertension, hyperlipidemia,       Neuro/Psych  (+) psychiatric history Anxiety, Depression and ADHD,       ROS Comment: Hx of head injury  GI/Hepatic/Renal/Endo    (+) obesity, morbid obesity,      Musculoskeletal     Abdominal   (+) obese,    Substance History - negative use     OB/GYN negative ob/gyn ROS         Other   arthritis,                    Anesthesia Plan    ASA 2     general   (Total IV Anesthesia    Patient understands anesthesia not responsible for dental damage.  )  intravenous induction     Anesthetic plan, all risks, benefits, and alternatives have been provided, discussed and informed consent has been obtained with: patient.

## 2021-10-14 NOTE — ANESTHESIA POSTPROCEDURE EVALUATION
Patient: Mina Romero    Procedure Summary     Date: 10/14/21 Room / Location: [facility] ENDOSCOPY 1 / [facility] ENDOSCOPY    Anesthesia Start: 1041 Anesthesia Stop: 1112    Procedure: COLONOSCOPY (N/A ) Diagnosis:       Colon cancer screening      (Colon cancer screening [Z12.11])    Surgeons: Riley Ryder MD Provider: Priyanka Harris DO    Anesthesia Type: general ASA Status: 2          Anesthesia Type: general    Vitals  Vitals Value Taken Time   /75 10/14/21 1127   Temp 36.6 °C (97.9 °F) 10/14/21 1127   Pulse 61 10/14/21 1127   Resp 20 10/14/21 1127   SpO2 96 % 10/14/21 1127           Post Anesthesia Care and Evaluation    Patient location during evaluation: bedside  Patient participation: complete - patient participated  Level of consciousness: awake  Pain management: adequate  Airway patency: patent  Anesthetic complications: No anesthetic complications  PONV Status: none  Cardiovascular status: acceptable and stable  Respiratory status: acceptable  Hydration status: acceptable    Comments: An Anesthesiologist personally participated in the most demanding procedures (including induction and emergence if applicable) in the anesthesia plan, monitored the course of anesthesia administration at frequent intervals and remained physically present and available for immediate diagnosis and treatment of emergencies.            
Declines

## 2021-10-14 NOTE — H&P
ScreeningPre Procedure History & Physical    Chief Complaint:   Screening     Subjective     HPI:   Screening     Past Medical History:   Past Medical History:   Diagnosis Date   • ADHD    • Allergic rhinitis 03/05/2019   • Allergies    • Anxiety    • Arthritis    • Asthma    • COVID-19 01/20/2021   • Emotional depression    • Head injury     X3 IN [place]   • High blood pressure    • High cholesterol    • Sinus trouble    • Tic        Past Surgical History:  Past Surgical History:   Procedure Laterality Date   • COLONOSCOPY     • INGUINAL HERNIA REPAIR Bilateral    • SHOULDER SURGERY  1995   • TONSILLECTOMY  1976       Family History:  Family History   Problem Relation Age of Onset   • Diabetes Mother    • Stroke Father    • Heart disease Father    • Diabetes Father    • Anxiety disorder Father    • Depression Father    • Heart disease Other         AUNT/UNCLE   • Anxiety disorder Sister        Social History:   reports that he has quit smoking. He has never used smokeless tobacco. He reports that he does not drink alcohol and does not use drugs.    Medications:   Medications Prior to Admission   Medication Sig Dispense Refill Last Dose   • cetirizine (zyrTEC) 10 MG tablet 10 mg.   10/13/2021 at Unknown time   • DULoxetine (CYMBALTA) 30 MG capsule Take 30 mg by mouth Daily.   10/13/2021 at Unknown time   • fluticasone (FLONASE) 50 MCG/ACT nasal spray 2 sprays into the nostril(s) as directed by provider Every Night.   Past Week at Unknown time   • guanFACINE HCl ER (INTUNIV) 1 MG tablet sustained-release 24 hour Take 2 mg by mouth Every Night for 60 days. 60 tablet 1 10/13/2021 at Unknown time   • hydrOXYzine (ATARAX) 10 MG tablet hydroxyzine HCl 10 mg oral tablet take 1 tablet by oral route daily   Active   10/13/2021 at Unknown time   • ibuprofen (ADVIL,MOTRIN) 800 MG tablet Take 800 mg by mouth Every 6 (Six) Hours As Needed.   Past Week at Unknown time   • methocarbamol (ROBAXIN) 500 MG tablet Take 500 mg by mouth  Daily.   10/13/2021 at Unknown time   • mirtazapine (REMERON) 30 MG tablet Take 15 mg by mouth Every Night.   10/13/2021 at Unknown time   • montelukast (SINGULAIR) 10 MG tablet Take 10 mg by mouth At Night As Needed.   10/13/2021 at Unknown time   • PEG-KCl-NaCl-NaSulf-Na Asc-C (MoviPrep) 100 g reconstituted solution powder Take 1,000 mL by mouth Every 12 (Twelve) Hours. 1000 mL 0 10/14/2021 at Unknown time   • prazosin (MINIPRESS) 2 MG capsule Take 2 mg by mouth Every Night.   10/13/2021 at Unknown time   • risperiDONE (risperDAL) 0.5 MG tablet Take 0.5 mg by mouth 2 (Two) Times a Day As Needed.   10/13/2021 at Unknown time   • rosuvastatin (CRESTOR) 10 MG tablet Take 10 mg by mouth Every Night.   10/13/2021 at Unknown time   • DULoxetine (CYMBALTA) 60 MG capsule       • sildenafil (VIAGRA) 100 MG tablet       • urea (CARMOL) 20 % cream Apply as needed Twice daily 85 g 3        Allergies:  Latex        Objective     Blood pressure 143/64, pulse 62, temperature 97.1 °F (36.2 °C), temperature source Temporal, resp. rate 20, weight 135 kg (297 lb 2.9 oz), SpO2 96 %.    Physical Exam   Constitutional: Pt is oriented to person, place, and time and well-developed, well-nourished, and in no distress.   Mouth/Throat: Oropharynx is clear and moist.   Neck: Normal range of motion.   Cardiovascular: Normal rate, regular rhythm and normal heart sounds.    Pulmonary/Chest: Effort normal and breath sounds normal.   Abdominal: Soft. Nontender  Skin: Skin is warm and dry.   Psychiatric: Mood, memory, affect and judgment normal.     Assessment/Plan     Diagnosis:  Screening colonoscopy       Anticipated Surgical Procedure:  colonoscopy    The risks, benefits, and alternatives of this procedure have been discussed with the patient or the responsible party- the patient understands and agrees to proceed.

## 2021-10-19 ENCOUNTER — TREATMENT (OUTPATIENT)
Dept: PHYSICAL THERAPY | Facility: CLINIC | Age: 56
End: 2021-10-19

## 2021-10-19 DIAGNOSIS — M76.891 ADDUCTOR TENDINITIS OF BOTH HIPS: ICD-10-CM

## 2021-10-19 DIAGNOSIS — M25.552 BILATERAL HIP PAIN: Primary | ICD-10-CM

## 2021-10-19 DIAGNOSIS — M62.89 MUSCLE TIGHTNESS: ICD-10-CM

## 2021-10-19 DIAGNOSIS — M25.551 BILATERAL HIP PAIN: Primary | ICD-10-CM

## 2021-10-19 DIAGNOSIS — M76.892 ADDUCTOR TENDINITIS OF BOTH HIPS: ICD-10-CM

## 2021-10-19 PROCEDURE — 97110 THERAPEUTIC EXERCISES: CPT | Performed by: PHYSICAL THERAPIST

## 2021-10-19 PROCEDURE — 97161 PT EVAL LOW COMPLEX 20 MIN: CPT | Performed by: PHYSICAL THERAPIST

## 2021-10-19 NOTE — PROGRESS NOTES
Physical Therapy Initial Evaluation and Plan of Care        Patient: Mina Romero   : 1965  Diagnosis/ICD-10 Code:  Bilateral hip pain [M25.551, M25.552]  Referring practitioner: Kemar Stroud DO  Date of Initial Visit: 10/19/2021  Today's Date: 10/19/2021  Patient seen for 1 sessions           Subjective Questionnaire: LEFS: 35/80      Subjective Evaluation    History of Present Illness  Mechanism of injury: Patient reports that he is getting sharp pain in bilateral anterior hips and hip adductor pain and tightness which started 2 years ago with no particular ARLINE.  Patient reports that pain has gradually worsened.  Patient reports that walking increases his pain. Pt reports hx of hernia repair, R hip stress fx and diastasis recti.  Patient is taking Motrin for pain control.  Pt would like to return to yoga.       Patient Occupation: works at [employer]  Current pain ratin  At best pain ratin  At worst pain ratin  Quality: burning, discomfort, pulling and sharp  Relieving factors: change in position  Aggravating factors: standing and movement  Progression: worsening    Social Support  Lives in: multiple-level home    Diagnostic Tests  X-ray: abnormal    Treatments  Previous treatment: physical therapy  Patient Goals  Patient goals for therapy: decreased pain, improved balance, increased motion, increased strength, independence with ADLs/IADLs, return to sport/leisure activities and return to work        Objective          Static Posture     Head  Forward.    Shoulders  Rounded.    Scapulae  Left retracted and right retracted.    Lumbar Spine   Flattened.     Hip   Hip (Left): Externally rotated.   Hip (Right): Externally rotated.     Knee   Genu valgus.     Palpation   Left   Hypertonic in the lumbar paraspinals.   Tenderness of the lumbar paraspinals.   Trigger point to lumbar paraspinals.     Right   Hypertonic in the lumbar paraspinals. Tenderness of the lumbar paraspinals.   Trigger  point to lumbar paraspinals.     Active Range of Motion   Left Hip   Flexion: 81 degrees   Extension: 5 degrees with pain  Abduction: WFL and with pain  Adduction: WFL and with pain    Right Hip   Flexion: 80 degrees   Extension: 5 degrees with pain  Abduction: WFL and with pain  Adduction: WFL and with pain    Additional Active Range of Motion Details  Lumbar AROM:  Flexion: 25% limited  Extension: 50% limited  Rotation: 25% limited bilaterally  Lateral flexion: 25% limited bilaterally    Passive Range of Motion   Left Hip   Flexion: 90 degrees with pain  Extension: 8 degrees with pain  Abduction: WFL    Right Hip   Flexion: 90 degrees with pain  Extension: 8 degrees with pain  Abduction: WFL    Strength/Myotome Testing     Left Hip   Planes of Motion   Flexion: 4  Extension: 4-  Abduction: 4-    Right Hip   Planes of Motion   Flexion: 4  Extension: 4-  Abduction: 4-    Left Knee   Flexion: 5  Extension: 5    Right Knee   Flexion: 5  Extension: 5    Left Ankle/Foot   Dorsiflexion: 5    Right Ankle/Foot   Dorsiflexion: 5    Tests     Lumbar     Left   Negative passive SLR and quadrant.     Right   Negative passive SLR and quadrant.     Left Hip   Positive CHRISTINE and piriformis.   Negative scour.   Kemar: Positive.   90/90 SLR: Positive.     Right Hip   Positive CHRISTINE and piriformis.   Negative scour.   Kemar: Positive.   90/90 SLR: Positive.     Left Hip Flexibility Comments:   Bilateral hamstring, hip flexor and hip adductor tightness noted    Ambulation     Observational Gait     Additional Observational Gait Details  Pt demonstrates decreased hip extension in stance phase bilaterally, decreased heel strike and push off on R LE.          Assessment & Plan     Assessment  Impairments: abnormal gait, abnormal muscle firing, abnormal or restricted ROM, activity intolerance, impaired balance, impaired physical strength, lacks appropriate home exercise program, pain with function, safety issue and weight-bearing  intolerance  Assessment details: Patient presents with signs/symptoms consistent with bilateral hip flexor and hip adductor tendinitis including decreased bilateral hip ROM, bilateral hip and core weakness and reports of pain limiting function during ADLs as shown on the LEFS.  Patient would benefit from skilled PT services in order to address remaining deficits limiting function at this time.     Prognosis: good  Functional Limitations: sleeping, walking, uncomfortable because of pain, moving in bed, standing and stooping  Goals  Plan Goals: 1. The patient complains of bilateral hip pain.   LTG 1: 12 weeks:  The patient will report a pain rating of 1/10 or better in order to improve  tolerance to activities of daily living and improve sleep quality.    STATUS:  New   STG 1a: 6 weeks:  The patient will report a pain rating of 3/10 or better.    STATUS:  New   TREATMENT:  Therapeutic exercises, manual therapy, aquatic therapy, home exercise   instruction, and modalities as needed for pain to include:  electrical stimulation, moist heat, ice,   ultrasound, and diathermy.    2. The patient demonstrates weakness of the bilateral hip.   LTG 2: 12 weeks:  The patient will demonstrate 5/5 strength for bilateral hip flexion, abduction,  and extension in order to improve hip stability.    STATUS:  New   STG 2a: 6 weeks:  The patient will demonstrate 4+/5 strength for bilateral hip flexion, abduction,  and extension.    STATUS:  New   TREATMENT: Therapeutic exercises, manual therapy, aquatic therapy, home exercise instruction,  and modalities as needed for pain to include:  electrical stimulation, moist heat, ice, and ultrasound    3. The patient has gait dysfunction.  LTG 3: 12 weeks:  The patient will ambulate without assistive device, independently, for community distances with minimal limp to the R lower extremity in order to improve mobility and allow patient to perform activities such as grocery shopping with greater  ease.  STATUS:  New  STG 3a: The patient will be independent in HEP.  STATUS:  New  TREATMENT: Gait training, aquatic therapy, therapeutic exercise, and home exercise instruction.    4. Mobility: Walking/Moving Around Functional Limitation    LTG 4: 12 weeks:  The patient will demonstrate 1-19% limitation by achieving a score of 65 on the Lower Extremity Functional Scale.  STATUS:  New  TREATMENT:  Manual therapy, therapeutic exercise, home exercise instruction, and modalities as needed to include: moist heat, electrical stimulation, and ultrasound.    Plan  Therapy options: will be seen for skilled physical therapy services  Planned modality interventions: cryotherapy, electrical stimulation/Russian stimulation and TENS  Planned therapy interventions: balance/weight-bearing training, ADL retraining, soft tissue mobilization, strengthening, stretching, therapeutic activities, joint mobilization, home exercise program, gait training, functional ROM exercises, flexibility, body mechanics training, postural training, neuromuscular re-education, manual therapy and spinal/joint mobilization  Frequency: 2x week  Duration in weeks: 12  Treatment plan discussed with: patient        Timed:         Manual Therapy:    0     mins  28256;     Therapeutic Exercise:    10     mins  34856;     Neuromuscular Vlad:    0    mins  90026;    Therapeutic Activity:     0     mins  07030;     Gait Trainin     mins  06187;     Ultrasound:     0     mins  38445;    Ionto                               0    mins   14815  Self-care  __0__ mins 06255    Un-Timed:  Electrical Stimulation:    0     mins  58208 ( );  Traction     0     mins 40645  Low Eval     40     Mins  56080  Mod Eval     0     Mins  18960  High Eval                       0     Mins  79824  Hot pack     0     Mins    Cold pack                       0     Mins      Timed Treatment:   10   mins   Total Treatment:     50   mins    PT SIGNATURE: Bzoena Garrett  PT   DATE TREATMENT INITIATED: 10/19/2021    Initial Certification  Certification Period: 1/17/2022  I certify that the therapy services are furnished while this patient is under my care.  The services outlined above are required by this patient, and will be reviewed every 90 days.     PHYSICIAN: Kemar Stroud, DO      DATE:     Please sign and return via fax to [phone].. Thank you, [facility].

## 2021-10-27 ENCOUNTER — TREATMENT (OUTPATIENT)
Dept: PHYSICAL THERAPY | Facility: CLINIC | Age: 56
End: 2021-10-27

## 2021-10-27 DIAGNOSIS — M76.891 ADDUCTOR TENDINITIS OF BOTH HIPS: ICD-10-CM

## 2021-10-27 DIAGNOSIS — M25.551 BILATERAL HIP PAIN: Primary | ICD-10-CM

## 2021-10-27 DIAGNOSIS — M62.89 MUSCLE TIGHTNESS: ICD-10-CM

## 2021-10-27 DIAGNOSIS — M76.892 ADDUCTOR TENDINITIS OF BOTH HIPS: ICD-10-CM

## 2021-10-27 DIAGNOSIS — M25.552 BILATERAL HIP PAIN: Primary | ICD-10-CM

## 2021-10-27 PROCEDURE — 97110 THERAPEUTIC EXERCISES: CPT | Performed by: PHYSICAL THERAPIST

## 2021-10-27 PROCEDURE — 97140 MANUAL THERAPY 1/> REGIONS: CPT | Performed by: PHYSICAL THERAPIST

## 2021-10-27 NOTE — PROGRESS NOTES
"Physical Therapy Daily Progress Note        Patient: Mina Romero   : 1965  Diagnosis/ICD-10 Code:  Bilateral hip pain [M25.551, M25.552]  Referring practitioner: Kemar Stroud DO  Date of Initial Visit: Type: THERAPY  Noted: 10/19/2021  Today's Date: 10/27/2021  Patient seen for 2 sessions             Subjective   Mina Romero reports having persistent \"Tightness\" and stiffness in bilateral hips.  He reports that both hips hurt, but reports that his right hip is more painful than his left.  He rates his right hip pain at 5/10 upon arrival today. He reports consistent performance of HEP.    Objective     + Hamstring and Hip Flexor tightness noted bilaterally  Hamstring 90/90 measurement:  Approximately -40 degrees B - (did improve after manual stretching)      See Exercise and  Manual Logs for complete treatment.       Assessment/Plan    Pt tolerated therapy session well - with progression of therapeutic exercises, Functional activities, and Manual therapy. He  does however, continue to have deficits in Bilateral Hip ROM,  Strength, and Stability; limiting function and ability to perform ADLs at this time.  Good initial response to manual therapy today; as pt  reported decrease in pain and tightness post session.    Progress per Plan of Care           Timed:  Manual Therapy:    15     mins  80966;  Therapeutic Exercise:    23     mins  86974;     Neuromuscular Vlad:    0    mins  48418;    Therapeutic Activity:     0     mins  49808;     Gait Trainin     mins  96184;     Ultrasound:     0     mins  81476;    Electrical Stimulation:    0     mins  46417;  Iontophoresis     0     mins  68691    Untimed:  Electrical Stimulation:    0     mins  28609 ( );  Mechanical Traction:    0     mins  17693;   Fluidotherapy     0     mins  46626  Hot pack     0     mins  20494  Cold pack     0     mins  61058    Timed Treatment:   38   mins   Total Treatment:     38   mins        Nanda Rodríguez" PTA  Physical Therapist Assistant

## 2021-10-29 ENCOUNTER — TREATMENT (OUTPATIENT)
Dept: PHYSICAL THERAPY | Facility: CLINIC | Age: 56
End: 2021-10-29

## 2021-10-29 DIAGNOSIS — M25.551 BILATERAL HIP PAIN: Primary | ICD-10-CM

## 2021-10-29 DIAGNOSIS — M76.892 ADDUCTOR TENDINITIS OF BOTH HIPS: ICD-10-CM

## 2021-10-29 DIAGNOSIS — M25.552 BILATERAL HIP PAIN: Primary | ICD-10-CM

## 2021-10-29 DIAGNOSIS — M62.89 MUSCLE TIGHTNESS: ICD-10-CM

## 2021-10-29 DIAGNOSIS — M76.891 ADDUCTOR TENDINITIS OF BOTH HIPS: ICD-10-CM

## 2021-10-29 PROCEDURE — 97140 MANUAL THERAPY 1/> REGIONS: CPT | Performed by: PHYSICAL THERAPIST

## 2021-10-29 PROCEDURE — 97110 THERAPEUTIC EXERCISES: CPT | Performed by: PHYSICAL THERAPIST

## 2021-10-29 NOTE — PROGRESS NOTES
Physical Therapy Daily Progress Note        Patient: Mina Romero   : 1965  Diagnosis/ICD-10 Code:  Bilateral hip pain [M25.551, M25.552]  Referring practitioner: Kemar Stroud DO  Date of Initial Visit: Type: THERAPY  Noted: 10/19/2021  Today's Date: 10/29/2021  Patient seen for 3 sessions             Subjective   Mina Romero reports: sharp R anterior hip at beginning of session today.     Objective   Bilateral hip abd MMT: 4-/5  See Exercise, Manual, and Modality Logs for complete treatment.       Assessment/Plan  Patient tolerated all exercise progressions and manual therapy well today but continues to demonstrate strength/ROM deficits limiting function. Continue to progress per patient tolerance.    Progress per Plan of Care           Timed:  Manual Therapy:    20     mins  24616;  Therapeutic Exercise:    25     mins  44065;     Neuromuscular Vlad:    0    mins  19061;    Therapeutic Activity:     0     mins  98087;     Gait Trainin     mins  73180;     Ultrasound:     0     mins  00615;    Electrical Stimulation:    0     mins  12837;  Iontophoresis     0     mins  36883    Untimed:  Electrical Stimulation:    0     mins  41160 ( );  Mechanical Traction:    0     mins  23712;   Fluidotherapy     0     mins  98476  Hot pack     0     Mins    Cold pack                       0     Mins     Timed Treatment:   45   mins   Total Treatment:     45   mins        Bozena Garrett PT  Physical Therapist

## 2021-11-02 ENCOUNTER — TREATMENT (OUTPATIENT)
Dept: PHYSICAL THERAPY | Facility: CLINIC | Age: 56
End: 2021-11-02

## 2021-11-02 DIAGNOSIS — M25.552 BILATERAL HIP PAIN: Primary | ICD-10-CM

## 2021-11-02 DIAGNOSIS — M76.892 ADDUCTOR TENDINITIS OF BOTH HIPS: ICD-10-CM

## 2021-11-02 DIAGNOSIS — M25.551 BILATERAL HIP PAIN: Primary | ICD-10-CM

## 2021-11-02 DIAGNOSIS — M62.89 MUSCLE TIGHTNESS: ICD-10-CM

## 2021-11-02 DIAGNOSIS — M76.891 ADDUCTOR TENDINITIS OF BOTH HIPS: ICD-10-CM

## 2021-11-02 PROCEDURE — 97110 THERAPEUTIC EXERCISES: CPT | Performed by: PHYSICAL THERAPIST

## 2021-11-02 PROCEDURE — 97140 MANUAL THERAPY 1/> REGIONS: CPT | Performed by: PHYSICAL THERAPIST

## 2021-11-02 NOTE — PROGRESS NOTES
Physical Therapy Daily Progress Note        Patient: Mina Romero   : 1965  Diagnosis/ICD-10 Code:  Bilateral hip pain [M25.551, M25.552]  Referring practitioner: Kemar Stroud DO  Date of Initial Visit: No linked episodes  Today's Date: 2021  Patient seen for Visit count could not be calculated. Make sure you are using a visit which is associated with an episode. sessions             Subjective   Mina Romero reports having same pain with limited change in status thus far.    Objective     + Hamstring and Hip Flexor tightness noted bilaterally  Hamstring 90/90 measurement:  Approximately -20-25 degrees B - (Improved in past 3 sessions)        See Exercise and Manual Logs for complete treatment.       Assessment/Plan     Pt tolerated therapy session well - with progression of therapeutic exercises, Functional activities, and Manual therapy. He  does however, continue to have deficits in Bilateral Hip ROM,  Strength, and Stability; limiting function and ability to perform ADLs at this time.  Good response to manual therapy; as pt  reports decrease in  tightness post sessions.  He continues; however, to report persistent pain right anterior pelvic/hip - Iliopsoas insertion region.  He also continues to present with significant tightness at bilateral hip/Knee musculature - right greater than left.      Progress per Plan of Care           Timed:  Manual Therapy:    20     mins  55543;  Therapeutic Exercise:    15     mins  86024;     Neuromuscular Vlad:    0    mins  07208;    Therapeutic Activity:     0     mins  09130;     Gait Trainin     mins  49154;     Ultrasound:     0     mins  12857;    Electrical Stimulation:    0     mins  08764;  Iontophoresis     0     mins  15455    Untimed:  Electrical Stimulation:    00     mins  18940 ( );  Mechanical Traction:    0     mins  41559;   Fluidotherapy     0     mins  03334  Hot pack     0     mins  42177  Cold pack     0     mins   20272    Timed Treatment:   35   mins   Total Treatment:     35   mins        Nanda Rodríguez PTA  Physical Therapist Assistant

## 2021-11-19 ENCOUNTER — TREATMENT (OUTPATIENT)
Dept: PHYSICAL THERAPY | Facility: CLINIC | Age: 56
End: 2021-11-19

## 2021-11-19 DIAGNOSIS — M76.892 ADDUCTOR TENDINITIS OF BOTH HIPS: ICD-10-CM

## 2021-11-19 DIAGNOSIS — M62.89 MUSCLE TIGHTNESS: ICD-10-CM

## 2021-11-19 DIAGNOSIS — M25.551 BILATERAL HIP PAIN: Primary | ICD-10-CM

## 2021-11-19 DIAGNOSIS — M25.552 BILATERAL HIP PAIN: Primary | ICD-10-CM

## 2021-11-19 DIAGNOSIS — M76.891 ADDUCTOR TENDINITIS OF BOTH HIPS: ICD-10-CM

## 2021-11-19 PROCEDURE — 97140 MANUAL THERAPY 1/> REGIONS: CPT | Performed by: PHYSICAL THERAPIST

## 2021-11-19 PROCEDURE — 97110 THERAPEUTIC EXERCISES: CPT | Performed by: PHYSICAL THERAPIST

## 2021-11-19 NOTE — PROGRESS NOTES
Progress note    Patient: Mina Romero   : 1965  Diagnosis/ICD-10 Code:  Bilateral hip pain [M25.551, M25.552]  Referring practitioner: Kemar Stroud DO  Date of Initial Visit: Type: THERAPY  Noted: 10/19/2021  Today's Date: 2021  Patient seen for 5 sessions    Progress note due: 2021  Re-cert due: 2022      Subjective:   Mina Romero reports: no significant pain at beginning of session today.  Patient reports that pain is about the same since starting therapy.  Patient states that he continues to have increased anterior hip pain after prolonged seated activities.  Subjective Questionnaire: LEFS: 3280  Clinical Progress: unchanged  Home Program Compliance: Yes  Treatment has included: therapeutic exercise and manual therapy    Subjective   Objective          Palpation   Left   Tenderness of the iliopsoas.     Right Tenderness of the iliopsoas.     Left Hip Palpation Comments   Iliopsoas: and significant tightness.     Right Hip Palpation Comments   Iliopsoas: and significant tightness.     Active Range of Motion   Left Hip   Flexion: WFL  Extension: 5 (lacking 0) degrees   Abduction: WFL    Right Hip   Flexion: WFL  Extension: 5 (lacking 0) degrees   Abduction: WFL    Passive Range of Motion   Left Hip   Extension: 0 degrees     Right Hip   Extension: 0 degrees     Strength/Myotome Testing     Left Hip   Planes of Motion   Flexion: 4+  Extension: 4-  Abduction: 4-    Right Hip   Planes of Motion   Flexion: 4+  Extension: 4-  Abduction: 4-    Left Knee   Flexion: 5  Extension: 5    Right Knee   Flexion: 5  Extension: 5    Left Ankle/Foot   Dorsiflexion: 5    Right Ankle/Foot   Dorsiflexion: 5      Assessment & Plan     Assessment  Impairments: abnormal gait, abnormal muscle firing, abnormal or restricted ROM, activity intolerance, impaired balance, impaired physical strength, lacks appropriate home exercise program, pain with function, safety issue and weight-bearing  intolerance  Assessment details: Pt continues to demonstrate bilateral hip and core weakness as well as significant hip flexor tightness and reports of pain limiting function during ADLs as shown on the LEFS. Pt would continue to benefit from skilled PT services in order to address remaining limiting function.      Prognosis: good  Functional Limitations: walking, uncomfortable because of pain, moving in bed, standing and stooping  Goals  Plan Goals: 1. The patient complains of bilateral hip pain.              LTG 1: 12 weeks:  The patient will report a pain rating of 1/10 or better in order to improve  tolerance to activities of daily living and improve sleep quality.                          STATUS:  Ongoing              STG 1a: 6 weeks:  The patient will report a pain rating of 3/10 or better.                          STATUS:  Ongoing              TREATMENT:  Therapeutic exercises, manual therapy, aquatic therapy, home exercise   instruction, and modalities as needed for pain to include:  electrical stimulation, moist heat, ice,   ultrasound, and diathermy.    2. The patient demonstrates weakness of the bilateral hip.              LTG 2: 12 weeks:  The patient will demonstrate 5/5 strength for bilateral hip flexion, abduction,  and extension in order to improve hip stability.                          STATUS:  Ongoing              STG 2a: 6 weeks:  The patient will demonstrate 4+/5 strength for bilateral hip flexion, abduction,  and extension.                          STATUS:  Ongoing              TREATMENT: Therapeutic exercises, manual therapy, aquatic therapy, home exercise instruction,  and modalities as needed for pain to include:  electrical stimulation, moist heat, ice, and ultrasound    3. The patient has gait dysfunction.  LTG 3: 12 weeks:  The patient will ambulate without assistive device, independently, for community distances with minimal limp to the R lower extremity in order to improve mobility and  allow patient to perform activities such as grocery shopping with greater ease.  STATUS:  Ongoing  STG 3a: The patient will be independent in HEP.  STATUS:  MET  TREATMENT: Gait training, aquatic therapy, therapeutic exercise, and home exercise instruction.    4. Mobility: Walking/Moving Around Functional Limitation                   LTG 4: 12 weeks:  The patient will demonstrate 1-19% limitation by achieving a score of 65 on the Lower Extremity Functional Scale.  STATUS:  Ongoing  TREATMENT:  Manual therapy, therapeutic exercise, home exercise instruction, and modalities as needed to include: moist heat, electrical stimulation, and ultrasound.    Plan  Therapy options: will be seen for skilled physical therapy services  Planned modality interventions: cryotherapy, electrical stimulation/Russian stimulation and TENS  Planned therapy interventions: balance/weight-bearing training, ADL retraining, soft tissue mobilization, strengthening, stretching, therapeutic activities, joint mobilization, home exercise program, gait training, functional ROM exercises, flexibility, body mechanics training, postural training, neuromuscular re-education and manual therapy  Frequency: 2x week  Duration in weeks: 12  Treatment plan discussed with: patient      Progress toward previous goals: Progressing towards        Recommendations: Continue as planned  Timeframe: 2 months  Prognosis to achieve goals: good    PT SIGNATURE: Bozena Garrett PT     Electronically signed 11/19/2021  DATE TREATMENT INITIATED: 11/19/2021  [place] License: [id number]  NPI number: [id number]     Certification Period: 11/19/2021 thru 2/16/2022    Based upon review of the patient's progress and continued therapy plan, it is my medical opinion that Mina Romero should continue physical therapy treatment at [facility]  [address] [place] [id number]  [phone].    Signature: __________________________________   Kemar Stroud, DO    Timed:  Manual Therapy:    15     mins  01746;  Therapeutic Exercise:    15     mins  54999;     Neuromuscular Vlad:    0    mins  81749;    Therapeutic Activity:     0     mins  02117;     Gait Trainin     mins  39774;     Ultrasound:     0     mins  16315;    Electrical Stimulation:    0     mins  81176 ( );    Untimed:  Electrical Stimulation:    0     mins  73524 ( );  Mechanical Traction:    0     mins  70073;     Timed Treatment:   30   mins   Total Treatment:     40   mins

## 2021-12-01 ENCOUNTER — TREATMENT (OUTPATIENT)
Dept: PHYSICAL THERAPY | Facility: CLINIC | Age: 56
End: 2021-12-01

## 2021-12-01 DIAGNOSIS — M25.552 BILATERAL HIP PAIN: Primary | ICD-10-CM

## 2021-12-01 DIAGNOSIS — M25.551 BILATERAL HIP PAIN: Primary | ICD-10-CM

## 2021-12-01 DIAGNOSIS — M76.892 ADDUCTOR TENDINITIS OF BOTH HIPS: ICD-10-CM

## 2021-12-01 DIAGNOSIS — M76.891 ADDUCTOR TENDINITIS OF BOTH HIPS: ICD-10-CM

## 2021-12-01 DIAGNOSIS — M62.89 MUSCLE TIGHTNESS: ICD-10-CM

## 2021-12-01 PROCEDURE — 97530 THERAPEUTIC ACTIVITIES: CPT | Performed by: PHYSICAL THERAPIST

## 2021-12-01 PROCEDURE — 97110 THERAPEUTIC EXERCISES: CPT | Performed by: PHYSICAL THERAPIST

## 2021-12-01 PROCEDURE — 97140 MANUAL THERAPY 1/> REGIONS: CPT | Performed by: PHYSICAL THERAPIST

## 2021-12-01 NOTE — PROGRESS NOTES
Physical Therapy Daily Progress Note        Patient: Mina Romero   : 1965  Diagnosis/ICD-10 Code:  Bilateral hip pain [M25.551, M25.552]  Referring practitioner: Kemar Stroud DO  Date of Initial Visit: Type: THERAPY  Noted: 10/19/2021  Today's Date: 2021  Patient seen for 6 sessions             Subjective   Mina Romero reports: 4/10 bilateral hip pain today.    Objective   Bilateral hip abduction MMT: 4-/5  See Exercise, Manual, and Modality Logs for complete treatment.       Assessment/Plan  Patient tolerated all exercise progressions and manual therapy well today but continues to demonstrate strength/ROM deficits limiting function. Continue to progress per patient tolerance.    Progress per Plan of Care           Timed:  Manual Therapy:    20     mins  39447;  Therapeutic Exercise:    12     mins  24507;     Neuromuscular Vlad:    0    mins  47791;    Therapeutic Activity:     8     mins  50234;     Gait Trainin     mins  11887;     Ultrasound:     0     mins  80969;    Electrical Stimulation:    0     mins  88263;  Iontophoresis     0     mins  04446    Untimed:  Electrical Stimulation:    0     mins  29339 ( );  Mechanical Traction:    0     mins  39446;   Fluidotherapy     0     mins  81922  Hot pack     0     Mins    Cold pack                       0     Mins     Timed Treatment:   40   mins   Total Treatment:     40   mins        Bozena Garrett PT    Electronically signed [unfilled]  [place] License: [id number]  NPI number: [id number]

## 2021-12-08 ENCOUNTER — TREATMENT (OUTPATIENT)
Dept: PHYSICAL THERAPY | Facility: CLINIC | Age: 56
End: 2021-12-08

## 2021-12-08 DIAGNOSIS — M76.891 ADDUCTOR TENDINITIS OF BOTH HIPS: ICD-10-CM

## 2021-12-08 DIAGNOSIS — M25.551 BILATERAL HIP PAIN: Primary | ICD-10-CM

## 2021-12-08 DIAGNOSIS — M25.552 BILATERAL HIP PAIN: Primary | ICD-10-CM

## 2021-12-08 DIAGNOSIS — M62.89 MUSCLE TIGHTNESS: ICD-10-CM

## 2021-12-08 DIAGNOSIS — M76.892 ADDUCTOR TENDINITIS OF BOTH HIPS: ICD-10-CM

## 2021-12-08 PROCEDURE — 97140 MANUAL THERAPY 1/> REGIONS: CPT | Performed by: PHYSICAL THERAPIST

## 2021-12-08 PROCEDURE — 97110 THERAPEUTIC EXERCISES: CPT | Performed by: PHYSICAL THERAPIST

## 2021-12-08 NOTE — PROGRESS NOTES
Physical Therapy Daily Progress Note        Patient: Mina Romero   : 1965  Diagnosis/ICD-10 Code:  Bilateral hip pain [M25.551, M25.552]  Referring practitioner: Kemar Stroud DO  Date of Initial Visit: Type: THERAPY  Noted: 10/19/2021  Today's Date: 2021  Patient seen for 7 sessions             Subjective   Mina Romero reported having increased pain in bilateral hips after last therapy session- He reports that he feels as if it was from the bridging on the physioball.  He reported that pain lasted about 2 days after therapy session.  He reported that it hurt bilaterally- Left greater than right- laterally.    Objective     + Hamstring and Hip Flexor tightness noted bilaterally.      See Exercise and Manual Logs for complete treatment.       Assessment/Plan     Pt tolerated therapy session well - with progression of therapeutic exercises, Functional activities, and Manual therapy. He  does however, continue to have deficits in Bilateral Hip ROM,  Strength, and Stability; limiting function and ability to perform ADLs at this time.  Good response to manual therapy; as pt  reports decrease in  tightness post sessions.  He continues; however, to report persistent pain right anterior pelvic/hip - Iliopsoas insertion region.  He also continues to present with significant tightness at bilateral hip/Knee musculature.      Progress per Plan of Care           Timed:  Manual Therapy:    15     mins  59235;  Therapeutic Exercise:    15     mins  66816;     Neuromuscular Vlad:    0    mins  35953;    Therapeutic Activity:     0     mins  44561;     Gait Trainin     mins  37321;     Ultrasound:     0     mins  03580;    Electrical Stimulation:    0     mins  22101;  Iontophoresis     0     mins  42250    Untimed:  Electrical Stimulation:    0     mins  19341 ( );  Mechanical Traction:    0     mins  28089;   Fluidotherapy     0     mins  62632  Hot pack     0     mins  10320  Cold pack     0      mins  53576    Timed Treatment:   30   mins   Total Treatment:     30   mins        Nanda Rodrígeuz PTA  Physical Therapist Assistant

## 2021-12-13 ENCOUNTER — TELEPHONE (OUTPATIENT)
Dept: FAMILY MEDICINE CLINIC | Facility: CLINIC | Age: 56
End: 2021-12-13

## 2021-12-13 NOTE — TELEPHONE ENCOUNTER
Made contact with patient letting him know that we will need to reschedule his appt for a different date

## 2021-12-15 ENCOUNTER — TREATMENT (OUTPATIENT)
Dept: PHYSICAL THERAPY | Facility: CLINIC | Age: 56
End: 2021-12-15

## 2021-12-15 DIAGNOSIS — M76.892 ADDUCTOR TENDINITIS OF BOTH HIPS: ICD-10-CM

## 2021-12-15 DIAGNOSIS — M25.551 BILATERAL HIP PAIN: Primary | ICD-10-CM

## 2021-12-15 DIAGNOSIS — M25.552 BILATERAL HIP PAIN: Primary | ICD-10-CM

## 2021-12-15 DIAGNOSIS — M62.89 MUSCLE TIGHTNESS: ICD-10-CM

## 2021-12-15 DIAGNOSIS — M76.891 ADDUCTOR TENDINITIS OF BOTH HIPS: ICD-10-CM

## 2021-12-15 PROCEDURE — 97140 MANUAL THERAPY 1/> REGIONS: CPT | Performed by: PHYSICAL THERAPIST

## 2021-12-15 PROCEDURE — 97530 THERAPEUTIC ACTIVITIES: CPT | Performed by: PHYSICAL THERAPIST

## 2021-12-29 ENCOUNTER — TREATMENT (OUTPATIENT)
Dept: PHYSICAL THERAPY | Facility: CLINIC | Age: 56
End: 2021-12-29

## 2021-12-29 ENCOUNTER — OFFICE VISIT (OUTPATIENT)
Dept: FAMILY MEDICINE CLINIC | Facility: CLINIC | Age: 56
End: 2021-12-29

## 2021-12-29 VITALS
OXYGEN SATURATION: 98 % | DIASTOLIC BLOOD PRESSURE: 75 MMHG | WEIGHT: 309 LBS | HEIGHT: 72 IN | RESPIRATION RATE: 17 BRPM | BODY MASS INDEX: 41.85 KG/M2 | SYSTOLIC BLOOD PRESSURE: 135 MMHG | HEART RATE: 60 BPM | TEMPERATURE: 98.3 F

## 2021-12-29 DIAGNOSIS — M76.892 ADDUCTOR TENDINITIS OF BOTH HIPS: ICD-10-CM

## 2021-12-29 DIAGNOSIS — M25.552 BILATERAL HIP PAIN: Primary | ICD-10-CM

## 2021-12-29 DIAGNOSIS — M76.891 ADDUCTOR TENDINITIS OF BOTH HIPS: ICD-10-CM

## 2021-12-29 DIAGNOSIS — M62.89 MUSCLE TIGHTNESS: ICD-10-CM

## 2021-12-29 DIAGNOSIS — K57.90 DIVERTICULOSIS: ICD-10-CM

## 2021-12-29 DIAGNOSIS — M25.551 BILATERAL HIP PAIN: Primary | ICD-10-CM

## 2021-12-29 DIAGNOSIS — E78.2 MIXED HYPERLIPIDEMIA: ICD-10-CM

## 2021-12-29 PROCEDURE — 99214 OFFICE O/P EST MOD 30 MIN: CPT | Performed by: FAMILY MEDICINE

## 2021-12-29 PROCEDURE — 97110 THERAPEUTIC EXERCISES: CPT | Performed by: PHYSICAL THERAPIST

## 2021-12-29 PROCEDURE — 97140 MANUAL THERAPY 1/> REGIONS: CPT | Performed by: PHYSICAL THERAPIST

## 2021-12-29 NOTE — PROGRESS NOTES
"Chief Complaint  Pain (itzel. hip)    Subjective          Mina Romero presents to [facility]  History of Present Illness  Patient presents today to follow-up for bilateral hip pain.  Last time I saw him for this issue was on 9/13/2021.  X-rays of the hip showed the joint space is well-maintained.  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  His main issue has been adductor tendinitis of both hips.  He has been going to physical therapy which has been helping him.  He would like to continue with physical therapy given the benefit he has had but he has not had full resolution so he would like to continue for that reason.  He had labs recently done.  His lipid panel showed his LDL at 116 with triglycerides of 252 and total cholesterol at 210.  [facility].  He is currently taking Crestor 10 mg nightly but he had run out.  He states that he [facility].  His CBC and CMP are otherwise unremarkable.  He has diverticulosis noted on a recent colonoscopy.  He will need to follow-up again in 5 years in 2026.  Objective   Vital Signs:   /75   Pulse 60   Temp 98.3 °F (36.8 °C)   Resp 17   Ht 182.9 cm (72\")   Wt (!) 140 kg (309 lb)   SpO2 98%   BMI 41.91 kg/m²     Physical Exam   General: AAO ×3, no acute distress, pleasant  HEENT: Normocephalic, atraumatic  Cardiovascular: Regular rate and rhythm without appreciable murmur  Respiratory: Clear to auscultation bilaterally no RRW  Musculoskeletal: Still having some tenderness at the ASIS on the right  extremities: No edema  Neurologic: CN II through XII grossly intact   Psychiatric: Normal mood and affect  Result Review :                 Assessment and Plan    Diagnoses and all orders for this visit:    1. Bilateral hip pain (Primary)  -     Ambulatory Referral to Physical Therapy    2. Muscle tightness  -     Ambulatory Referral to Physical Therapy    3. Adductor tendinitis of both " hips  -     Ambulatory Referral to Physical Therapy    4. Diverticulosis    5. Mixed hyperlipidemia    I discussed with patient continue current management for bilateral hip pain.  I discussed renewing referral for physical therapy.  We discussed management of diverticulosis to help prevent diverticulitis.  He will [facility] to [facility].  I will see him back in 3 months or sooner if needed.    Follow Up   Return in about 3 months (around 3/29/2022) for bilateral hip pain.  Patient was given instructions and counseling regarding his condition or for health maintenance advice. Please see specific information pulled into the AVS if appropriate.

## 2021-12-29 NOTE — PROGRESS NOTES
Re-Assessment / Re-Certification    Patient: Mina Romero   : 1965  Diagnosis/ICD-10 Code:  Bilateral hip pain [M25.551, M25.552]  Referring practitioner: Kemar Stroud DO  Date of Initial Visit: Type: THERAPY  Noted: 10/19/2021  Today's Date: 2021  Patient seen for 9 sessions    Progress note due: 2022  Re-cert due: 3/29/2022      Subjective:   Mina Romero reports: improvements in bilateral hip pain. Pt reports that hip flexor and adductor tightness has improved but states that he continues to have intermittent R anterior hip pain. Pt states that he has little to no L hip pain during the day.  Subjective Questionnaire: LEFS: 43/80  Clinical Progress: improved  Home Program Compliance: Yes  Treatment has included: therapeutic exercise, manual therapy and therapeutic activity    Subjective   Objective          Palpation     Right Tenderness of the iliopsoas.     Active Range of Motion   Left Hip   Flexion: 90 degrees with pain  Extension: 0 degrees   Abduction: WFL  External rotation (90/90): WFL  Internal rotation (90/90): with pain    Strength/Myotome Testing     Left Hip   Planes of Motion   Flexion: 5  Extension: 4  Abduction: 4    Right Hip   Planes of Motion   Flexion: 4+  Extension: 4  Abduction: 4    Left Knee   Flexion: 5  Extension: 5    Right Knee   Flexion: 4+  Extension: 4+    Left Ankle/Foot   Dorsiflexion: 5    Right Ankle/Foot   Dorsiflexion: 5      Assessment & Plan     Assessment  Impairments: abnormal gait, abnormal muscle firing, abnormal or restricted ROM, activity intolerance, impaired balance, impaired physical strength, lacks appropriate home exercise program, pain with function, safety issue and weight-bearing intolerance  Functional Limitations: walking, uncomfortable because of pain, moving in bed, standing and stooping  Assessment details: Pt demonstrates improvements in bilateral hip strength and reports of pain but still with strength deficits, bilateral hip  flexor tightness reports of pain limiting function during ADLs as shown on the LEFS. Pt would continue to benefit from skilled PT services in order to address remaining limiting function.      Prognosis: good    Goals  Plan Goals: 1. The patient complains of bilateral hip pain.              LTG 1: 12 weeks:  The patient will report a pain rating of 1/10 or better in order to improve  tolerance to activities of daily living and improve sleep quality.                          STATUS:  Ongoing              STG 1a: 6 weeks:  The patient will report a pain rating of 3/10 or better.                          STATUS:  Met on L, continue with R              TREATMENT:  Therapeutic exercises, manual therapy, aquatic therapy, home exercise   instruction, and modalities as needed for pain to include:  electrical stimulation, moist heat, ice,   ultrasound, and diathermy.    2. The patient demonstrates weakness of the bilateral hip.              LTG 2: 12 weeks:  The patient will demonstrate 5/5 strength for bilateral hip flexion, abduction,  and extension in order to improve hip stability.                          STATUS:  Ongoing              STG 2a: 6 weeks:  The patient will demonstrate 4+/5 strength for bilateral hip flexion, abduction,  and extension.                          STATUS:  Not met, continue              TREATMENT: Therapeutic exercises, manual therapy, aquatic therapy, home exercise instruction,  and modalities as needed for pain to include:  electrical stimulation, moist heat, ice, and ultrasound    3. The patient has gait dysfunction.  LTG 3: 12 weeks:  The patient will ambulate without assistive device, independently, for community distances with minimal limp to the R lower extremity in order to improve mobility and allow patient to perform activities such as grocery shopping with greater ease.  STATUS:  Ongoing  STG 3a: The patient will be independent in [facility].  STATUS:  MET  TREATMENT: Gait training, aquatic  therapy, therapeutic exercise, and home exercise instruction.    4. Mobility: Walking/Moving Around Functional Limitation                   LTG 4: 12 weeks:  The patient will demonstrate 1-19% limitation by achieving a score of 65 on the Lower Extremity Functional Scale.  STATUS:  Ongoing  TREATMENT:  Manual therapy, therapeutic exercise, home exercise instruction, and modalities as needed to include: moist heat, electrical stimulation, and ultrasound.    Plan  Therapy options: will be seen for skilled therapy services  Planned modality interventions: cryotherapy, electrical stimulation/Russian stimulation and TENS  Planned therapy interventions: balance/weight-bearing training, ADL retraining, soft tissue mobilization, strengthening, stretching, therapeutic activities, joint mobilization, home exercise program, gait training, functional ROM exercises, flexibility, body mechanics training, postural training, neuromuscular re-education and manual therapy  Frequency: 2x week  Duration in weeks: 12  Treatment plan discussed with: patient      Progress toward previous goals: Partially Met        Recommendations: Continue as planned  Timeframe: 1 month  Prognosis to achieve goals: good    PT SIGNATURE: Bozena Garrett, PT     Electronically signed 2021  DATE TREATMENT INITIATED: 2021  [place] License: [id number]     Certification Period: 2021 thru 3/28/2022    Based upon review of the patient's progress and continued therapy plan, it is my medical opinion that Mina Romero should continue physical therapy treatment at [facility]  [address] [place] [id number]  [phone].    Signature: __________________________________  Kemar Stroud,     Timed:  Manual Therapy:    20     mins  93431;  Therapeutic Exercise:    19     mins  68360;     Neuromuscular Vlad:    0    mins  37236;    Therapeutic Activity:     0     mins  24929;     Gait Trainin      mins  55035;     Ultrasound:     0     mins  68835;    Electrical Stimulation:    0     mins  38058 ( );    Untimed:  Electrical Stimulation:    0     mins  08207 ( );  Mechanical Traction:    0     mins  35628;     Timed Treatment:   39   mins   Total Treatment:     44   mins

## 2022-01-13 ENCOUNTER — TREATMENT (OUTPATIENT)
Dept: PHYSICAL THERAPY | Facility: CLINIC | Age: 57
End: 2022-01-13

## 2022-01-13 PROCEDURE — 97530 THERAPEUTIC ACTIVITIES: CPT | Performed by: PHYSICAL THERAPIST

## 2022-01-13 PROCEDURE — 97110 THERAPEUTIC EXERCISES: CPT | Performed by: PHYSICAL THERAPIST

## 2022-01-13 PROCEDURE — 97140 MANUAL THERAPY 1/> REGIONS: CPT | Performed by: PHYSICAL THERAPIST

## 2022-01-13 NOTE — PROGRESS NOTES
"Physical Therapy Daily Progress Note        Patient: Mina Romero   : 1965  Diagnosis/ICD-10 Code:  No primary diagnosis found.  Referring practitioner: No ref. provider found  Date of Initial Visit: Type: THERAPY  Noted: 10/19/2021  Today's Date: 2022  Patient seen for 10 sessions             Subjective   Mina Romero reports that his hips have been feeling \"Decent'.  He rates his right anterior hip pain at 3/10 upon arrival today.    Objective     + Hamstring and Hip Flexor tightness noted bilaterally.  + Iliopsoas tightness bilaterally       See Exercise and Manual Logs for complete treatment.       Assessment/Plan     Pt tolerated therapy session well - with performance of therapeutic exercises, Functional activities, and Manual therapy. He  does however, continue to have deficits in Bilateral Hip ROM,  Strength, and Stability; limiting function and ability to perform ADLs at this time.  Good response to manual therapy; as pt  reports decrease in  tightness post sessions.  He continues; however, to report persistent pain right anterior pelvic/hip - Iliopsoas insertion region.  He also continues to present with significant tightness at bilateral hip/Knee musculature.     Progress per Plan of Care           Timed:  Manual Therapy:    20     mins  32050;  Therapeutic Exercise:    10     mins  47125;     Neuromuscular Vlad:    0    mins  92197;    Therapeutic Activity:     10     mins  35094;     Gait Trainin     mins  00631;     Ultrasound:     0     mins  01634;    Electrical Stimulation:    0     mins  59803;  Iontophoresis     0     mins  12573    Untimed:  Electrical Stimulation:    0     mins  21646 (MC );  Mechanical Traction:    0     mins  11347;   Fluidotherapy     0     mins  56860  Hot pack     0     mins  66528  Cold pack     0     mins  85082    Timed Treatment:   40   mins   Total Treatment:     40   mins        Nanda Rodríguez PTA  Physical Therapist Assistant  "

## 2022-01-19 ENCOUNTER — TREATMENT (OUTPATIENT)
Dept: PHYSICAL THERAPY | Facility: CLINIC | Age: 57
End: 2022-01-19

## 2022-01-19 DIAGNOSIS — M62.89 MUSCLE TIGHTNESS: ICD-10-CM

## 2022-01-19 DIAGNOSIS — M76.892 ADDUCTOR TENDINITIS OF BOTH HIPS: ICD-10-CM

## 2022-01-19 DIAGNOSIS — M76.891 ADDUCTOR TENDINITIS OF BOTH HIPS: ICD-10-CM

## 2022-01-19 DIAGNOSIS — M25.551 BILATERAL HIP PAIN: Primary | ICD-10-CM

## 2022-01-19 DIAGNOSIS — M25.552 BILATERAL HIP PAIN: Primary | ICD-10-CM

## 2022-01-19 PROCEDURE — 97110 THERAPEUTIC EXERCISES: CPT | Performed by: PHYSICAL THERAPIST

## 2022-01-19 PROCEDURE — 97140 MANUAL THERAPY 1/> REGIONS: CPT | Performed by: PHYSICAL THERAPIST

## 2022-01-19 NOTE — PROGRESS NOTES
Physical Therapy Daily Progress Note        Patient: Mina Romero   : 1965  Diagnosis/ICD-10 Code:  Bilateral hip pain [M25.551, M25.552]  Referring practitioner: No ref. provider found  Date of Initial Visit: Type: THERAPY  Noted: 10/19/2021  Today's Date: 2022  Patient seen for 11 sessions             Subjective   Mina Romero reports: that he is feeling much better.    Objective   Bilateral hip abduction: 4-/5  See Exercise, Manual, and Modality Logs for complete treatment.       Assessment/Plan  Patient tolerated all exercise progressions and manual therapy well today but continues to demonstrate strength/ROM deficits limiting function. Continue to progress per patient tolerance.    Progress per Plan of Care           Timed:  Manual Therapy:    20     mins  10036;  Therapeutic Exercise:    25     mins  79058;     Neuromuscular Vlad:    0    mins  45547;    Therapeutic Activity:     0     mins  66502;     Gait Trainin     mins  93446;     Ultrasound:     0     mins  71002;    Electrical Stimulation:    0     mins  98474;  Iontophoresis     0     mins  84651    Untimed:  Electrical Stimulation:    0     mins  63495 ( );  Mechanical Traction:    0     mins  87750;   Fluidotherapy     0     mins  61531  Hot pack     0     Mins    Cold pack                       0     Mins     Timed Treatment:   45   mins   Total Treatment:     45   mins        Bozena Garrett PT    Electronically signed [unfilled]  [place] License: [id number]  NPI number: [id number]

## 2022-01-26 ENCOUNTER — TREATMENT (OUTPATIENT)
Dept: PHYSICAL THERAPY | Facility: CLINIC | Age: 57
End: 2022-01-26

## 2022-01-26 DIAGNOSIS — M62.89 MUSCLE TIGHTNESS: ICD-10-CM

## 2022-01-26 DIAGNOSIS — M76.891 ADDUCTOR TENDINITIS OF BOTH HIPS: ICD-10-CM

## 2022-01-26 DIAGNOSIS — M25.552 BILATERAL HIP PAIN: Primary | ICD-10-CM

## 2022-01-26 DIAGNOSIS — M25.551 BILATERAL HIP PAIN: Primary | ICD-10-CM

## 2022-01-26 DIAGNOSIS — M76.892 ADDUCTOR TENDINITIS OF BOTH HIPS: ICD-10-CM

## 2022-01-26 PROCEDURE — 97140 MANUAL THERAPY 1/> REGIONS: CPT | Performed by: PHYSICAL THERAPIST

## 2022-01-26 PROCEDURE — 97530 THERAPEUTIC ACTIVITIES: CPT | Performed by: PHYSICAL THERAPIST

## 2022-01-26 PROCEDURE — 97110 THERAPEUTIC EXERCISES: CPT | Performed by: PHYSICAL THERAPIST

## 2022-01-26 NOTE — PROGRESS NOTES
"Physical Therapy Daily Progress / Discharge Note        Patient: Mina Romero   : 1965  Diagnosis/ICD-10 Code:  Bilateral hip pain [M25.551, M25.552]  Referring practitioner: Kemar Stroud DO  Date of Initial Visit: Type: THERAPY  Noted: 10/19/2021  Today's Date: 2022  Patient seen for 12 sessions             Subjective   Mina Romero reports that he has been feeling much better- stating \"90% better\" .  He states that he has had only minimal intermittent pain in either hip recently.    Subjective Questionnaire: LEFS: 39/80      Objective     Palpation    +Tightness Iliopsoas  And  hip/thigh musculature.      Active Range of Motion   Left / Right Hip   Flexion: 98 degrees -Bilaterally  Extension: 0-5 degrees- Bilaterally  Abduction: WFL- Bilaterally  External rotation (90/90): WFL- Bilaterally  Internal rotation (90/90): 25 degrees -Left   22 degrees - Right     Strength/Myotome Testing      Left Hip   Planes of Motion   Flexion: 5/5  Extension: 4+/5  Abduction: 4+/5     Right Hip   Planes of Motion   Flexion: 4+/5  Extension: 4+/5  Abduction: 4+/5     Left Knee   Flexion: 5/5  Extension: 5/5     Right Knee   Flexion: 5/5  Extension: 5/5     Left Ankle/Foot   Dorsiflexion: 5/5     Right Ankle/Foot   Dorsiflexion: 5/5     See Exercise and Manual Logs for complete treatment.       Assessment/Plan    Pt has tolerated therapy sessions well - with performance of therapeutic exercises, Functional activities, and Manual therapy. He has made notable improvements in all areas- exhibiting improved Bilateral Hip ROM and Strength. He has also responded well  to manual therapy; as evidenced by pt  reporting feeling approximately 90% reduction in pain and  tightness since beginning therapy.  He is Independent with progressive HEP and expresses desire to continue with HEP on his own.  Pt discharged this date.      Goals  Plan Goals: 1. The patient complains of bilateral hip pain.              LTG 1: 12 weeks:  The " patient will report a pain rating of 1/10 or better in order to improve  tolerance to activities of daily living and improve sleep quality.                          STATUS:  MET              STG 1a: 6 weeks:  The patient will report a pain rating of 3/10 or better.                          STATUS:  MET              TREATMENT:  Therapeutic exercises, manual therapy, aquatic therapy, home exercise   instruction, and modalities as needed for pain to include:  electrical stimulation, moist heat, ice,   ultrasound, and diathermy.    2. The patient demonstrates weakness of the bilateral hip.              LTG 2: 12 weeks:  The patient will demonstrate 5/5 strength for bilateral hip flexion, abduction,  and extension in order to improve hip stability.                          STATUS:  PARTIALLY MET              STG 2a: 6 weeks:  The patient will demonstrate 4+/5 strength for bilateral hip flexion, abduction,  and extension.                          STATUS:  MET              TREATMENT: Therapeutic exercises, manual therapy, aquatic therapy, home exercise instruction,  and modalities as needed for pain to include:  electrical stimulation, moist heat, ice, and ultrasound    3. The patient has gait dysfunction.  LTG 3: 12 weeks:  The patient will ambulate without assistive device, independently, for community distances with minimal limp to the R lower extremity in order to improve mobility and allow patient to perform activities such as grocery shopping with greater ease.  STATUS:  MET  STG 3a: The patient will be independent in HEP.  STATUS:  MET  TREATMENT: Gait training, aquatic therapy, therapeutic exercise, and home exercise instruction.    4. Mobility: Walking/Moving Around Functional Limitation                   LTG 4: 12 weeks:  The patient will demonstrate 1-19% limitation by achieving a score of 65 on the Lower Extremity Functional Scale.  STATUS:  IMPROVED- BUT UNMET               Timed:  Manual Therapy:    20      mins  94783;  Therapeutic Exercise:    15     mins  26027;     Neuromuscular Vlad:    0    mins  21017;    Therapeutic Activity:     10     mins  05394;     Gait Trainin     mins  11286;     Ultrasound:     0     mins  51636;    Electrical Stimulation:    0     mins  23668;  Iontophoresis     0     mins  86748    Untimed:  Electrical Stimulation:    0     mins  58374 ( );  Mechanical Traction:    0     mins  39215;   Fluidotherapy     0     mins  68606  Hot pack     0     mins  56153  Cold pack     0     mins  14507    Timed Treatment: 45     mins   Total Treatment:     45   mins        Nanda Rodríguez PTA  Physical Therapist Assistant

## 2022-05-10 ENCOUNTER — OFFICE VISIT (OUTPATIENT)
Dept: FAMILY MEDICINE CLINIC | Facility: CLINIC | Age: 57
End: 2022-05-10

## 2022-05-10 VITALS
DIASTOLIC BLOOD PRESSURE: 82 MMHG | TEMPERATURE: 99.4 F | HEART RATE: 84 BPM | HEIGHT: 72 IN | WEIGHT: 293.8 LBS | OXYGEN SATURATION: 97 % | SYSTOLIC BLOOD PRESSURE: 126 MMHG | BODY MASS INDEX: 39.8 KG/M2

## 2022-05-10 DIAGNOSIS — M54.50 CHRONIC BILATERAL LOW BACK PAIN WITHOUT SCIATICA: ICD-10-CM

## 2022-05-10 DIAGNOSIS — F33.1 MODERATE EPISODE OF RECURRENT MAJOR DEPRESSIVE DISORDER: ICD-10-CM

## 2022-05-10 DIAGNOSIS — M51.36 DDD (DEGENERATIVE DISC DISEASE), LUMBAR: Primary | ICD-10-CM

## 2022-05-10 DIAGNOSIS — G89.29 CHRONIC BILATERAL LOW BACK PAIN WITHOUT SCIATICA: ICD-10-CM

## 2022-05-10 PROCEDURE — 99213 OFFICE O/P EST LOW 20 MIN: CPT | Performed by: FAMILY MEDICINE

## 2022-05-10 RX ORDER — CYCLOBENZAPRINE HCL 10 MG
10 TABLET ORAL 3 TIMES DAILY PRN
Qty: 90 TABLET | Refills: 0 | Status: SHIPPED | OUTPATIENT
Start: 2022-05-10 | End: 2022-11-17

## 2022-05-10 NOTE — PROGRESS NOTES
"Chief Complaint  Low back pain    Subjective          Mina Romero presents to [facility]  History of Present Illness  Patient presents today to discuss low back pain.  This has been a chronic issue for him.  He has tried stretching exercises but this only provides temporary relief.  He has been receiving physical therapy for bilateral hip pain and muscle tightness which has helped out.  He reports his hips are for the most part better but now his low back is mainly giving him issues.  He denies any radiation of pain down his legs.  He does take Robaxin but it does not seem to help much.  Objective   Vital Signs:  /82   Pulse 84   Temp 99.4 °F (37.4 °C)   Ht 182.9 cm (72\")   Wt 133 kg (293 lb 12.8 oz)   SpO2 97%   BMI 39.85 kg/m²           Physical Exam   General: AAO ×3, no acute distress, pleasant  HEENT: Normocephalic, atraumatic  Musculoskeletal: Paraspinal hypertonicity of the lumbar spine.  Nontender to palpation.  Cardiovascular: Regular rate and rhythm without appreciable murmur  Respiratory: Clear to auscultation bilaterally no RRW  extremities: No edema  Neurologic: CN II through XII grossly intact   Psychiatric: Normal mood and affect  Result Review :{Labs  Result Review  Imaging  Med Tab  Media  Procedures :23}                 Assessment and Plan    Diagnoses and all orders for this visit:    1. DDD (degenerative disc disease), lumbar (Primary)  -     Ambulatory Referral to Physical Therapy Evaluate and treat; Stretching, Strengthening    2. Chronic bilateral low back pain without sciatica  -     Ambulatory Referral to Physical Therapy Evaluate and treat; Stretching, Strengthening    3. Moderate episode of recurrent major depressive disorder (HCC)    Other orders  -     cyclobenzaprine (FLEXERIL) 10 MG tablet; Take 1 tablet by mouth 3 (Three) Times a Day As Needed for Muscle Spasms.  Dispense: 90 tablet; Refill: 0    I discussed with patient his " previous x-ray findings.  He had an x-ray done of lumbar spine back on 4/9/2020 that showed per report mild reversal of the lumbar lordosis and changes of mild degenerative disc disease.  Patient had nonbridging osteophytes at L3-L4 and L5 along the anterior superior endplate.  We discussed treatment options.  I will switch him to Flexeril.  We discussed a trial of physical therapy since he has had improvement with his hips previously and he would prefer a conservative measure such as this over more aggressive measures at this time.  I do find this reasonable.  Plan to reassess in 3 months.  If he continues to have issues we can always consider an MRI for further evaluation.  I discussed with patient avoiding operating heavy machinery or equipment while taking Flexeril due to drowsiness side effect.         Follow Up   Return in about 3 months (around 8/10/2022) for low back pain.  Patient was given instructions and counseling regarding his condition or for health maintenance advice. Please see specific information pulled into the AVS if appropriate.

## 2022-05-11 RX ORDER — UREA 200 MG/G
GEL TOPICAL
Qty: 85 G | Refills: 3 | Status: SHIPPED | OUTPATIENT
Start: 2022-05-11 | End: 2023-01-23 | Stop reason: SDUPTHER

## 2022-05-11 NOTE — TELEPHONE ENCOUNTER
Caller: Mina Romero    Relationship: Self    Best call back number: [phone]    Requested Prescriptions:   Requested Prescriptions     Pending Prescriptions Disp Refills   • urea (CARMOL) 20 % cream 85 g 3     Sig: Apply as needed Twice daily      Pharmacy where request should be sent:   [facility]  [address] [place], [place] [id number] [phone]    Additional details provided by patient: PATIENT IS FULLY OUT OF MEDICATION.    Does the patient have less than a 3 day supply:  [x] Yes  [] No    Francisca Llamas Rep   05/11/22 09:17 EDT

## 2022-06-29 ENCOUNTER — TREATMENT (OUTPATIENT)
Dept: PHYSICAL THERAPY | Facility: CLINIC | Age: 57
End: 2022-06-29

## 2022-06-29 DIAGNOSIS — G89.29 CHRONIC BILATERAL LOW BACK PAIN, UNSPECIFIED WHETHER SCIATICA PRESENT: ICD-10-CM

## 2022-06-29 DIAGNOSIS — M54.50 CHRONIC BILATERAL LOW BACK PAIN, UNSPECIFIED WHETHER SCIATICA PRESENT: ICD-10-CM

## 2022-06-29 PROCEDURE — 97110 THERAPEUTIC EXERCISES: CPT | Performed by: PHYSICAL THERAPIST

## 2022-06-29 PROCEDURE — 97161 PT EVAL LOW COMPLEX 20 MIN: CPT | Performed by: PHYSICAL THERAPIST

## 2022-06-29 NOTE — PROGRESS NOTES
Physical Therapy Initial Evaluation and Plan of Care      Patient: Mina Romero   : 1965  Diagnosis/ICD-10 Code:  Chronic bilateral low back pain, unspecified whether sciatica present [M54.50, G89.29]  Referring practitioner: Kemar Stroud DO  Date of Initial Visit: 2022  Today's Date: 2022  Patient seen for 1 sessions    Progress note due: 2022  Re-cert due: 2022           Subjective Questionnaire: RIMA:         Subjective Evaluation    History of Present Illness  Mechanism of injury: Pt states he has had back pain for ages . He was in the  for 21 years and thinks the heavy gear he wore had a toll on his back over time. Pain resides in the mid low back and occasionally  between his shoulder blades. He averages 10k step a day at work but also has to do a lot of sitting. He has a standing desk at work. He says he tries to work on his posture at home and work. Pain is worse with extended sitting, standing, or walking more than 15 to 20 minutes. He does heat a lot throughout the day and takes Flexeril and Moltrin. He continues to do his hip exercises at home.     Pain  Current pain ratin  At best pain rating: 3  At worst pain ratin  Relieving factors: heat, medications and support  Aggravating factors: lifting and standing             Objective          Static Posture     Head  Forward.    Shoulders  Rounded.    Lumbar Spine   Increased lordosis.     Active Range of Motion     Additional Active Range of Motion Details  50% decrease in lumbar AROM: Flexion more limited than extension   Pain worse with extension.     Strength/Myotome Testing     Left Hip   Planes of Motion   Flexion: 4-  Abduction: 4+  Adduction: 4-    Right Hip   Planes of Motion   Flexion: 4-  Abduction: 4+  Adduction: 4-              Assessment & Plan     Assessment  Impairments: abnormal muscle firing, abnormal or restricted ROM, activity intolerance, impaired physical strength, lacks appropriate  home exercise program, pain with function and safety issue  Functional Limitations: carrying objects, lifting, sleeping, walking, pulling, pushing, uncomfortable because of pain, moving in bed, sitting, standing and stooping  Assessment details: Pt presents with chronic low back pain with decreased core strength, lumbar mobility, and deficits in standing and sitting posture. Pt has limited tolerance to sitting, walking, and standing due to his back pain and Pt will benefit from skilled PT to address functional deficits and return to PLOF.         Goals  Plan Goals: 1. The patient has complaints of pain.   LTG 1: 6 weeks:  The patient will report lower back pain no greater than 2 in order to improve tolerance with activities of daily living and improve sleep quality.    STATUS:  New   STG 1a: 3 weeks:  The patient will report lower back pain no greater than 4.     STATUS:  New   TREATMENT:  Manual therapy, therapeutic exercise, home exercise instruction, aquatic therapy, pelvic traction, and modalities as needed to include: electrical stimulation, ultrasound, moist heat, and ice.     2. Decreased B hip weakness    LTG 2: 6 weeks: Pt will improve bilateral hip flexor and abduction strength to 4+/5 to improve BLE function.     STATUS:  New     STG 2a: 3 weeks:  Pt will improve bilateral hip flexor and abduction strength to 4/5 to improve BLE function    STATUS:  New   TREATMENT: Manual therapy, therapeutic exercise, home exercise instruction, aquatic therapy, pelvic traction, and modalities as needed to include: electrical stimulation, ultrasound, moist heat, and ice.        3. Mobility: Walking/Moving Around Functional Limitation     LTG 3: 6 weeks:  The patient will demonstrate  RIMA score to 18 to decrease limitation.     STATUS:  New      Plan  Therapy options: will be seen for skilled therapy services  Planned modality interventions: cryotherapy, TENS and thermotherapy (hydrocollator packs)  Planned therapy  interventions: abdominal trunk stabilization, manual therapy, motor coordination training, neuromuscular re-education, postural training, soft tissue mobilization, spinal/joint mobilization, strengthening, stretching, therapeutic activities, joint mobilization, home exercise program, functional ROM exercises and body mechanics training        Visit Diagnoses:    ICD-10-CM ICD-9-CM   1. Chronic bilateral low back pain, unspecified whether sciatica present  M54.50 724.2    G89.29 338.29       Timed:         Manual Therapy:    0     mins  39636;     Therapeutic Exercise:    10     mins  89548;     Neuromuscular Vlad:    0    mins  70180;    Therapeutic Activity:     0     mins  38358;     Gait Trainin     mins  74221;     Ultrasound:     0     mins  10865;    Ionto                               0    mins   27202  Self-care  __0__ mins 75730    Un-Timed:  Electrical Stimulation:    0     mins  31443 ( );  Traction     0     mins 22395  Low Eval     30     Mins  41838  Mod Eval     0     Mins  38321  High Eval                       0     Mins  76152  Hot pack     0     Mins    Cold pack                       0     Mins      Timed Treatment:   10   mins   Total Treatment:     40   mins    PT SIGNATURE: Camilla Mullins PT, DPT      Initial Certification  Certification Period: 2022 thru 2022  I certify that the therapy services are furnished while this patient is under my care.  The services outlined above are required by this patient, and will be reviewed every 90 days.     PHYSICIAN: Kemar Stroud DO   NPI: [id number]     DATE:     Please sign and return via fax to [phone].. Thank you, [facility].

## 2022-07-14 ENCOUNTER — TREATMENT (OUTPATIENT)
Dept: PHYSICAL THERAPY | Facility: CLINIC | Age: 57
End: 2022-07-14

## 2022-07-14 DIAGNOSIS — G89.29 CHRONIC BILATERAL LOW BACK PAIN, UNSPECIFIED WHETHER SCIATICA PRESENT: Primary | ICD-10-CM

## 2022-07-14 DIAGNOSIS — M54.50 CHRONIC BILATERAL LOW BACK PAIN, UNSPECIFIED WHETHER SCIATICA PRESENT: Primary | ICD-10-CM

## 2022-07-14 PROCEDURE — 97110 THERAPEUTIC EXERCISES: CPT | Performed by: PHYSICAL THERAPIST

## 2022-07-14 NOTE — PROGRESS NOTES
Physical Therapy Daily Treatment Note        Patient: Mina Romero   : 1965  Diagnosis/ICD-10 Code:  Chronic bilateral low back pain, unspecified whether sciatica present [M54.50, G89.29]  Referring practitioner: Kemar Stroud DO  Date of Initial Visit: Type: THERAPY  Noted: 2022  Today's Date: 2022  Patient seen for 2 sessions             Subjective   Mina Romero reports that he had some increased right lower back pain and tightness over the past few days.  He reports consistently performing stretches and HEP.  He also continues to report that he continues with weight reduction plan.    Objective     Hip Strength:  Flexion:  4-/5 Bilaterally (MMT post exercise session)    See Exercise Logs for complete treatment.       Assessment/Plan    Pt tolerated therapy session well - with progression of therapeutic exercises and CKC-Functional activities. He has improved, but continues to have deficits in Trunk-Core and Bilateral Lower Extremity  Flexibility,  Strength, and Stability; limiting function and ability to perform all ADLs at this time.    Progress per Plan of Care           Timed:  Manual Therapy:    0     mins  38384;  Therapeutic Exercise:    38     mins  36593;     Neuromuscular Vlad:    0    mins  48461;    Therapeutic Activity:     0     mins  82394;     Gait Trainin     mins  25062;     Ultrasound:     0     mins  70392;    Electrical Stimulation:    0     mins  83048;  Iontophoresis     0     mins  49569    Untimed:  Electrical Stimulation:    0     mins  48559 ( );  Mechanical Traction:    0     mins  30261;   Fluidotherapy     0     mins  04893  Hot pack     0     mins  37105  Cold pack     0     mins  25464    Timed Treatment:   38   mins   Total Treatment:     38   mins        Nanda Rodríguez PTA  Physical Therapist Assistant

## 2022-07-22 ENCOUNTER — TREATMENT (OUTPATIENT)
Dept: PHYSICAL THERAPY | Facility: CLINIC | Age: 57
End: 2022-07-22

## 2022-07-22 DIAGNOSIS — M25.551 BILATERAL HIP PAIN: ICD-10-CM

## 2022-07-22 DIAGNOSIS — G89.29 CHRONIC BILATERAL LOW BACK PAIN, UNSPECIFIED WHETHER SCIATICA PRESENT: Primary | ICD-10-CM

## 2022-07-22 DIAGNOSIS — M54.50 CHRONIC BILATERAL LOW BACK PAIN, UNSPECIFIED WHETHER SCIATICA PRESENT: Primary | ICD-10-CM

## 2022-07-22 DIAGNOSIS — M25.552 BILATERAL HIP PAIN: ICD-10-CM

## 2022-07-22 DIAGNOSIS — M62.89 MUSCLE TIGHTNESS: ICD-10-CM

## 2022-07-22 PROCEDURE — 97110 THERAPEUTIC EXERCISES: CPT | Performed by: PHYSICAL THERAPIST

## 2022-07-22 PROCEDURE — 97140 MANUAL THERAPY 1/> REGIONS: CPT | Performed by: PHYSICAL THERAPIST

## 2022-07-22 NOTE — PROGRESS NOTES
Physical Therapy Daily Treatment Note        Patient: Mina Romero   : 1965  Diagnosis/ICD-10 Code:  Chronic bilateral low back pain, unspecified whether sciatica present [M54.50, G89.29]  Referring practitioner: Kemar Stroud DO  Date of Initial Visit: Type: THERAPY  Noted: 2022  Today's Date: 2022  Patient seen for 3 sessions             Subjective   Mina Romero reports having same pain in his right lower back- rating 5/10 upon arrival.  He states that his pain increases if he sits or Stands  too long.  He reports that he has     Objective     Active Range of Motion     Additional Active Range of Motion Details  50% decrease in lumbar AROM: Flexion more limited than extension   Pain worse with extension.        See Exercise and Manual Logs for complete treatment.       Assessment/Plan     Pt tolerated therapy session well - with progression of therapeutic exercises and Functional activities, and Manual therapy.  He has  continues to have deficits in Trunk-Core and Bilateral Lower Extremity  Flexibility,  Strength, and Stability; limiting function and ability to perform all ADLs without pain and difficulty at this time.  He continues to exhibit greater tightness on Right as compared to Left.       Progress per Plan of Care           Timed:  Manual Therapy:    10     mins  63055;  Therapeutic Exercise:    28     mins  74086;     Neuromuscular Vlad:    0    mins  60109;    Therapeutic Activity:     0     mins  61530;     Gait Trainin     mins  16600;     Ultrasound:     0     mins  93668;    Electrical Stimulation:    0     mins  54654;  Iontophoresis     0     mins  20266    Untimed:  Electrical Stimulation:    0     mins  76259 ( );  Mechanical Traction:    0     mins  70437;   Fluidotherapy     0     mins  54090  Hot pack     0     mins  38612  Cold pack     0     mins  19529    Timed Treatment:   38   mins   Total Treatment:     38   mins        Nanda Rodríguez PTA  Physical  Therapist Assistant

## 2022-07-27 ENCOUNTER — TREATMENT (OUTPATIENT)
Dept: PHYSICAL THERAPY | Facility: CLINIC | Age: 57
End: 2022-07-27

## 2022-07-27 DIAGNOSIS — M25.552 BILATERAL HIP PAIN: ICD-10-CM

## 2022-07-27 DIAGNOSIS — M54.50 CHRONIC BILATERAL LOW BACK PAIN, UNSPECIFIED WHETHER SCIATICA PRESENT: Primary | ICD-10-CM

## 2022-07-27 DIAGNOSIS — M62.89 MUSCLE TIGHTNESS: ICD-10-CM

## 2022-07-27 DIAGNOSIS — M25.551 BILATERAL HIP PAIN: ICD-10-CM

## 2022-07-27 DIAGNOSIS — G89.29 CHRONIC BILATERAL LOW BACK PAIN, UNSPECIFIED WHETHER SCIATICA PRESENT: Primary | ICD-10-CM

## 2022-07-27 PROCEDURE — 97140 MANUAL THERAPY 1/> REGIONS: CPT | Performed by: PHYSICAL THERAPIST

## 2022-07-27 PROCEDURE — 97110 THERAPEUTIC EXERCISES: CPT | Performed by: PHYSICAL THERAPIST

## 2022-07-27 NOTE — PROGRESS NOTES
Physical Therapy Daily Treatment Note        Patient: Mina Romero   : 1965  Diagnosis/ICD-10 Code:  Chronic bilateral low back pain, unspecified whether sciatica present [M54.50, G89.29]  Referring practitioner: Kemar Stroud DO  Date of Initial Visit: Type: THERAPY  Noted: 2022  Today's Date: 2022  Patient seen for 4 sessions             Subjective   Mina Romero reports having 6/10 pain and aching in his right lower back and hip area.  He reports having slight increase in stiffness after new exercises and stretches performed at last session.  He continues to report most all pain and tightness is at right Lumbosacral region    Objective     Strength/Myotome Testing      Left Hip   Planes of Motion   Flexion: 4/5  Abduction: 4+/5  Adduction: 4+/5     Right Hip   Planes of Motion   Flexion: 4-/5  Abduction: 4/5  Adduction: 4/5          See Exercise and Manual Logs for complete treatment.       Assessment/Plan     Pt tolerated therapy session well - with progression of therapeutic exercises and Functional activities, and Manual therapy.  He has improved, but continues to have deficits in Trunk-Core and Bilateral Lower Extremity  Flexibility,  Strength, and Stability; limiting function and ability to perform all ADLs without pain and difficulty at this time.  He continues to exhibit greater tightness and weakness on Right as compared to Left.          Progress per Plan of Care           Timed:  Manual Therapy:    15     mins  68701;  Therapeutic Exercise:    30     mins  48998;     Neuromuscular Vlad:    0    mins  60835;    Therapeutic Activity:     0     mins  77089;     Gait Trainin     mins  91769;     Ultrasound:     0     mins  51827;    Electrical Stimulation:    0     mins  09932;  Iontophoresis     0     mins  95748    Untimed:  Electrical Stimulation:    0     mins  40791 ( );  Mechanical Traction:    0     mins  81976;   Fluidotherapy     0     mins  67403  Hot pack      0     mins  66053  Cold pack     0     mins  03063    Timed Treatment:   45   mins   Total Treatment:     45   mins        Nanda Rodríguez PTA  Physical Therapist Assistant

## 2022-08-09 NOTE — PROGRESS NOTES
Physical Therapy Daily Progress Note        Patient: Mina Romero   : 1965  Diagnosis/ICD-10 Code:  Bilateral hip pain [M25.551, M25.552]  Referring practitioner: Kemar Stroud DO  Date of Initial Visit: Type: THERAPY  Noted: 10/19/2021  Today's Date: 12/15/2021  Patient seen for 8 sessions             Subjective   Mina Romero reports having persistent pain at right anterior hip.  He reports that he does get some temporary reduction in hip pain after therapy session, but also reports having occasional increase in lower back discomfort.    Objective     + Hamstring and Hip Flexor tightness noted bilaterally.  + Iliopsoas tightness bilaterally    See Exercise and Manual Logs for complete treatment.       Assessment/Plan     Pt tolerated therapy session well - with performance of therapeutic exercises, Functional activities, and Manual therapy. He  does however, continue to have deficits in Bilateral Hip ROM,  Strength, and Stability; limiting function and ability to perform ADLs at this time.  Good response to manual therapy; as pt  reports decrease in  tightness post sessions.  He continues; however, to report persistent pain right anterior pelvic/hip - Iliopsoas insertion region.  He also continues to present with significant tightness at bilateral hip/Knee musculature.      Progress per Plan of Care           Timed:  Manual Therapy:    20     mins  77549;  Therapeutic Exercise:   0      mins  92000;     Neuromuscular Vlad:    0    mins  60175;    Therapeutic Activity:     10     mins  83286;     Gait Trainin     mins  41485;     Ultrasound:     0     mins  09745;    Electrical Stimulation:    0     mins  85633;  Iontophoresis     0     mins  15649    Untimed:  Electrical Stimulation:    0     mins  93387 ( );  Mechanical Traction:    0     mins  22582;   Fluidotherapy     0     mins  56363  Hot pack     0     mins  38410  Cold pack     0     mins  78151    Timed Treatment:   30   mins    Total Treatment:     30   mins        Nanda Rodríguez PTA  Physical Therapist Assistant   no

## 2022-08-10 ENCOUNTER — TREATMENT (OUTPATIENT)
Dept: PHYSICAL THERAPY | Facility: CLINIC | Age: 57
End: 2022-08-10

## 2022-08-10 DIAGNOSIS — M62.89 MUSCLE TIGHTNESS: ICD-10-CM

## 2022-08-10 DIAGNOSIS — M25.551 BILATERAL HIP PAIN: ICD-10-CM

## 2022-08-10 DIAGNOSIS — G89.29 CHRONIC BILATERAL LOW BACK PAIN, UNSPECIFIED WHETHER SCIATICA PRESENT: Primary | ICD-10-CM

## 2022-08-10 DIAGNOSIS — M25.552 BILATERAL HIP PAIN: ICD-10-CM

## 2022-08-10 DIAGNOSIS — M54.50 CHRONIC BILATERAL LOW BACK PAIN, UNSPECIFIED WHETHER SCIATICA PRESENT: Primary | ICD-10-CM

## 2022-08-10 PROCEDURE — 97140 MANUAL THERAPY 1/> REGIONS: CPT | Performed by: PHYSICAL THERAPIST

## 2022-08-10 PROCEDURE — 97110 THERAPEUTIC EXERCISES: CPT | Performed by: PHYSICAL THERAPIST

## 2022-08-10 NOTE — PROGRESS NOTES
Physical Therapy Progress Note      Patient: Mina Romero   : 1965  Referring practitioner: Keamr Stroud DO  Date of Initial Visit: Type: THERAPY  Noted: 2022  Today's Date: 8/10/2022  Patient seen for 5 sessions           Subjective   Mina Romero reports: compliant with home exercises and stretches.  Patient reports feeling tight in low back/hips  Subjective Questionnaire: Oswestry: 24/50=48% limitation some improvement since initial score 25/45=56% limitation      Objective          Active Range of Motion     Additional Active Range of Motion Details  Lumbar AROM is WFL    Strength/Myotome Testing     Left Hip   Planes of Motion   Flexion: 4+  Extension: 4  Abduction: 4+  Adduction: 4+    Right Hip   Planes of Motion   Flexion: 4+  Extension: 4  Abduction: 4+  Adduction: 4+      See Exercise, Manual, and Modality Logs for complete treatment.       Assessment & Plan       Goals  Plan Goals: 1. The patient has complaints of pain.              LTG 1: 6 weeks:  The patient will report lower back pain no greater than 2 in order to improve tolerance with activities of daily living and improve sleep quality.                          STATUS:  Met              STG 1a: 3 weeks:  The patient will report lower back pain no greater than 4.                           STATUS:  Met              TREATMENT:  Manual therapy, therapeutic exercise, home exercise instruction, aquatic therapy, pelvic traction, and modalities as needed to include: electrical stimulation, ultrasound, moist heat, and ice.                2. Decreased B hip weakness               LTG 2: 6 weeks: Pt will improve bilateral hip flexor and abduction strength to 4+/5 to improve BLE function.                           STATUS: Met              STG 2a: 3 weeks:  Pt will improve bilateral hip flexor and abduction strength to 4/5 to improve BLE function                          STATUS:  Met              TREATMENT: Manual therapy, therapeutic  exercise, home exercise instruction, aquatic therapy, pelvic traction, and modalities as needed to include: electrical stimulation, ultrasound, moist heat, and ice.                              3. Mobility: Walking/Moving Around Functional Limitation                               LTG 3: 6 weeks:  The patient will demonstrate  RIMA score to 18 to decrease limitation.                           STATUS:  Not met    Plan  Planned therapy interventions: abdominal trunk stabilization, manual therapy, flexibility, functional ROM exercises, home exercise program, stretching and strengthening  Treatment plan discussed with: patient  Plan details: Patient states will attend one more therapy session to finalize home program.         Visit Diagnoses:    ICD-10-CM ICD-9-CM   1. Chronic bilateral low back pain, unspecified whether sciatica present  M54.50 724.2    G89.29 338.29   2. Bilateral hip pain  M25.551 719.45    M25.552    3. Muscle tightness  M62.89 728.9       Anticipate DC next Visit           Timed:  Manual Therapy:    12     mins  53910;  Therapeutic Exercise:    16     mins  36723;     Neuromuscular Vlad:        mins  22188;    Therapeutic Activity:          mins  21374;     Gait Training:           mins  69399;     Ultrasound:          mins  33625;    Electrical Stimulation:         mins  99059 ( );    Untimed:  Electrical Stimulation:         mins  79442 ( );  Mechanical Traction:         mins  82926;     Timed Treatment:   28   mins   Total Treatment:     28  mins  Ledy Kinney PT    Electronically singed 8/10/2022      [place] PT license: [id number]  Physical Therapist

## 2022-08-23 ENCOUNTER — TELEPHONE (OUTPATIENT)
Dept: FAMILY MEDICINE CLINIC | Facility: CLINIC | Age: 57
End: 2022-08-23

## 2022-08-23 NOTE — TELEPHONE ENCOUNTER
Patient called and stated that his Apple Watch went off yesterday and gave him 17 notices throughout the day telling him he was in AFIB. He said he feels fine and really doesn't think it's anything. Scheduled for appointment on 09/12/2022. Please call patient and advise

## 2022-08-23 NOTE — TELEPHONE ENCOUNTER
Phone call placed to patient with review of report from his Apple watch. Patient denies any cardiac symptoms and stated that he would call if they occur to get in sooner to see the provider. Offer further assistance if needed with voiced appreciation.

## 2022-08-24 NOTE — TELEPHONE ENCOUNTER
Phone call placed on 8/23/22 to patient with update on appointment time and if symptoms arise to call the office with voiced understanding.    Detail Level: Zone Include Location In Plan?: No

## 2022-08-31 ENCOUNTER — OFFICE VISIT (OUTPATIENT)
Dept: FAMILY MEDICINE CLINIC | Facility: CLINIC | Age: 57
End: 2022-08-31

## 2022-08-31 ENCOUNTER — TREATMENT (OUTPATIENT)
Dept: PHYSICAL THERAPY | Facility: CLINIC | Age: 57
End: 2022-08-31

## 2022-08-31 VITALS
TEMPERATURE: 98.2 F | DIASTOLIC BLOOD PRESSURE: 68 MMHG | HEART RATE: 76 BPM | OXYGEN SATURATION: 98 % | WEIGHT: 286.8 LBS | BODY MASS INDEX: 38.85 KG/M2 | HEIGHT: 72 IN | SYSTOLIC BLOOD PRESSURE: 126 MMHG

## 2022-08-31 DIAGNOSIS — M62.89 MUSCLE TIGHTNESS: ICD-10-CM

## 2022-08-31 DIAGNOSIS — R00.2 PALPITATIONS: ICD-10-CM

## 2022-08-31 DIAGNOSIS — G89.29 CHRONIC BILATERAL LOW BACK PAIN, UNSPECIFIED WHETHER SCIATICA PRESENT: Primary | ICD-10-CM

## 2022-08-31 DIAGNOSIS — R06.02 SHORTNESS OF BREATH: ICD-10-CM

## 2022-08-31 DIAGNOSIS — I48.91 NEW ONSET ATRIAL FIBRILLATION: Primary | ICD-10-CM

## 2022-08-31 DIAGNOSIS — M25.551 BILATERAL HIP PAIN: ICD-10-CM

## 2022-08-31 DIAGNOSIS — R73.09 ABNORMAL GLUCOSE: ICD-10-CM

## 2022-08-31 DIAGNOSIS — M54.50 CHRONIC BILATERAL LOW BACK PAIN, UNSPECIFIED WHETHER SCIATICA PRESENT: Primary | ICD-10-CM

## 2022-08-31 DIAGNOSIS — Z12.5 SCREENING FOR PROSTATE CANCER: ICD-10-CM

## 2022-08-31 DIAGNOSIS — M25.552 BILATERAL HIP PAIN: ICD-10-CM

## 2022-08-31 DIAGNOSIS — E78.5 HYPERLIPIDEMIA, UNSPECIFIED HYPERLIPIDEMIA TYPE: ICD-10-CM

## 2022-08-31 LAB
ALBUMIN SERPL-MCNC: 4.1 G/DL (ref 3.5–5.2)
ALBUMIN/GLOB SERPL: 1.8 G/DL
ALP SERPL-CCNC: 82 U/L (ref 39–117)
ALT SERPL W P-5'-P-CCNC: 31 U/L (ref 1–41)
ANION GAP SERPL CALCULATED.3IONS-SCNC: 8.8 MMOL/L (ref 5–15)
AST SERPL-CCNC: 30 U/L (ref 1–40)
BASOPHILS # BLD AUTO: 0.03 10*3/MM3 (ref 0–0.2)
BASOPHILS NFR BLD AUTO: 0.5 % (ref 0–1.5)
BILIRUB SERPL-MCNC: 0.4 MG/DL (ref 0–1.2)
BUN SERPL-MCNC: 17 MG/DL (ref 6–20)
BUN/CREAT SERPL: 14 (ref 7–25)
CALCIUM SPEC-SCNC: 9.3 MG/DL (ref 8.6–10.5)
CHLORIDE SERPL-SCNC: 105 MMOL/L (ref 98–107)
CHOLEST SERPL-MCNC: 165 MG/DL (ref 0–200)
CO2 SERPL-SCNC: 25.2 MMOL/L (ref 22–29)
CREAT SERPL-MCNC: 1.21 MG/DL (ref 0.76–1.27)
DEPRECATED RDW RBC AUTO: 39.9 FL (ref 37–54)
EGFRCR SERPLBLD CKD-EPI 2021: 69.8 ML/MIN/1.73
EOSINOPHIL # BLD AUTO: 0.08 10*3/MM3 (ref 0–0.4)
EOSINOPHIL NFR BLD AUTO: 1.2 % (ref 0.3–6.2)
ERYTHROCYTE [DISTWIDTH] IN BLOOD BY AUTOMATED COUNT: 12.7 % (ref 12.3–15.4)
GLOBULIN UR ELPH-MCNC: 2.3 GM/DL
GLUCOSE SERPL-MCNC: 73 MG/DL (ref 65–99)
HBA1C MFR BLD: 5.2 % (ref 4.8–5.6)
HCT VFR BLD AUTO: 44.8 % (ref 37.5–51)
HDLC SERPL-MCNC: 32 MG/DL (ref 40–60)
HGB BLD-MCNC: 15.3 G/DL (ref 13–17.7)
IMM GRANULOCYTES # BLD AUTO: 0.02 10*3/MM3 (ref 0–0.05)
IMM GRANULOCYTES NFR BLD AUTO: 0.3 % (ref 0–0.5)
LDLC SERPL CALC-MCNC: 85 MG/DL (ref 0–100)
LDLC/HDLC SERPL: 2.34 {RATIO}
LYMPHOCYTES # BLD AUTO: 1.71 10*3/MM3 (ref 0.7–3.1)
LYMPHOCYTES NFR BLD AUTO: 25.9 % (ref 19.6–45.3)
MCH RBC QN AUTO: 29.6 PG (ref 26.6–33)
MCHC RBC AUTO-ENTMCNC: 34.2 G/DL (ref 31.5–35.7)
MCV RBC AUTO: 86.7 FL (ref 79–97)
MONOCYTES # BLD AUTO: 0.57 10*3/MM3 (ref 0.1–0.9)
MONOCYTES NFR BLD AUTO: 8.6 % (ref 5–12)
NEUTROPHILS NFR BLD AUTO: 4.18 10*3/MM3 (ref 1.7–7)
NEUTROPHILS NFR BLD AUTO: 63.5 % (ref 42.7–76)
NRBC BLD AUTO-RTO: 0 /100 WBC (ref 0–0.2)
PLATELET # BLD AUTO: 227 10*3/MM3 (ref 140–450)
PMV BLD AUTO: 9.6 FL (ref 6–12)
POTASSIUM SERPL-SCNC: 4.2 MMOL/L (ref 3.5–5.2)
PROT SERPL-MCNC: 6.4 G/DL (ref 6–8.5)
PSA SERPL-MCNC: 1.51 NG/ML (ref 0–4)
RBC # BLD AUTO: 5.17 10*6/MM3 (ref 4.14–5.8)
SODIUM SERPL-SCNC: 139 MMOL/L (ref 136–145)
TRIGL SERPL-MCNC: 291 MG/DL (ref 0–150)
VLDLC SERPL-MCNC: 48 MG/DL (ref 5–40)
WBC NRBC COR # BLD: 6.59 10*3/MM3 (ref 3.4–10.8)

## 2022-08-31 PROCEDURE — 93000 ELECTROCARDIOGRAM COMPLETE: CPT | Performed by: FAMILY MEDICINE

## 2022-08-31 PROCEDURE — 85025 COMPLETE CBC W/AUTO DIFF WBC: CPT | Performed by: FAMILY MEDICINE

## 2022-08-31 PROCEDURE — 83036 HEMOGLOBIN GLYCOSYLATED A1C: CPT | Performed by: FAMILY MEDICINE

## 2022-08-31 PROCEDURE — 97140 MANUAL THERAPY 1/> REGIONS: CPT | Performed by: PHYSICAL THERAPIST

## 2022-08-31 PROCEDURE — 36415 COLL VENOUS BLD VENIPUNCTURE: CPT | Performed by: FAMILY MEDICINE

## 2022-08-31 PROCEDURE — G0103 PSA SCREENING: HCPCS | Performed by: FAMILY MEDICINE

## 2022-08-31 PROCEDURE — 80061 LIPID PANEL: CPT | Performed by: FAMILY MEDICINE

## 2022-08-31 PROCEDURE — 80053 COMPREHEN METABOLIC PANEL: CPT | Performed by: FAMILY MEDICINE

## 2022-08-31 PROCEDURE — 99214 OFFICE O/P EST MOD 30 MIN: CPT | Performed by: FAMILY MEDICINE

## 2022-08-31 RX ORDER — ASPIRIN 81 MG/1
81 TABLET ORAL DAILY
Qty: 90 TABLET | Refills: 1 | Status: SHIPPED | OUTPATIENT
Start: 2022-08-31 | End: 2023-01-23 | Stop reason: SDUPTHER

## 2022-08-31 RX ORDER — ARIPIPRAZOLE 5 MG/1
5 TABLET ORAL
COMMUNITY
Start: 2022-08-23 | End: 2023-01-13

## 2022-08-31 NOTE — PROGRESS NOTES
"Physical Therapy Daily Treatment Note        Patient: Mina Romero   : 1965  Diagnosis/ICD-10 Code:  Chronic bilateral low back pain, unspecified whether sciatica present [M54.50, G89.29]  Referring practitioner: Kemar Stroud DO  Date of Initial Visit: Type: THERAPY  Noted: 2022  Today's Date: 2022  Patient seen for 6 sessions             Subjective   Mina Romero reports that his upper back has been better, but states that his lower back issues are about the same.  He also reports that he hasn't been doing his exercises over the past few days because of fatigue.  He states, that he was just diagnosed with \"AFIB\".      Objective     Active Range of Motion     Additional Active Range of Motion Details  Lumbar AROM is WFL     Strength/Myotome Testing      Left Hip   Planes of Motion   Flexion: 4+/5  Extension: 4/5  Abduction: 4+/5  Adduction: 4+/5     Right Hip   Planes of Motion   Flexion: 4+/5  Extension: 4/5  Abduction: 4+/5  Adduction: 4+/5     See Exercise and Manual Logs for complete treatment.       Assessment/Plan     Pt has tolerated therapy sessions well - with progression of therapeutic exercises and Functional activities, and Manual therapy.  He has made functional improvements overall, but continues to have decreased Trunk and Core Strength and Stability and exhibits moderate tightness in Lower Trunk and Bilateral Hips;  limiting function and ability to perform all ADLs without discomfort.  He continues to exhibit greater tightness and weakness on Right as compared to Left lower back and hip musculature.  He has been instructed in and is independent with progressive HEP to address Core/Trunk Stabilization as well as overall flexibility.      Plan:  Pt discharged this date from outpatient Physical therapy.  He has been instructed to continue with his HEP and to follow-up with MD as needed.  Pt verbalized good understanding and agreement with plan.           Timed:  Manual Therapy:    " 25     mins  77338;  Therapeutic Exercise:    5     mins  28605;     Neuromuscular Vlad:    0    mins  57921;    Therapeutic Activity:     0     mins  31381;     Gait Trainin     mins  05797;     Ultrasound:     0     mins  49148;    Electrical Stimulation:    0     mins  09191;  Iontophoresis     0     mins  23229    Untimed:  Electrical Stimulation:    0     mins  05349 ( );  Mechanical Traction:    0     mins  44311;   Fluidotherapy     0     mins  85570  Hot pack     0     mins  69704  Cold pack     0     mins  05500    Timed Treatment:   30   mins   Total Treatment:     30   mins        Nanda Rodríguez PTA  Physical Therapist Assistant

## 2022-08-31 NOTE — PROGRESS NOTES
"Chief Complaint  Atrial Fibrillation    Subjective        Mina Romero presents to [facility]  History of Present Illness  Patient presents today with concerns about atrial fibrillation.  He has a smart watch that is able to track his heart rhythm.  He has received numerous notifications over the past 2 weeks about being in atrial fibrillation.  He has had issues with lightheadedness and feeling dizzy as well but this has been going on for the past several months as well.  He is concerned and wanted to come in for further evaluation.  EKG done in office today with results recorded below.  He is noted to have absence of a P wave with atrial fibrillation noted on his EKG which is a departure from his previous EKG from 2/14/2020 where he was in sinus rhythm.  Denies any traumatic event that may have occurred in the last few months or any increased stressors.  There is no acute ST or T wave changes.  Patient will periodically experience palpitations and this will feel sore in his chest when this occurs.  He denies any exertional chest pain.  He is due for labs so we discussed getting these done today as well.  He is taking rosuvastatin 10 mg for hyperlipidemia.  Discussed checking A1c as well.  He is due for screening PSA as well.  His last PSA was 3.3.  Objective   Vital Signs:  /68   Pulse 76   Temp 98.2 °F (36.8 °C)   Ht 182.9 cm (72\")   Wt 130 kg (286 lb 12.8 oz)   SpO2 98%   BMI 38.90 kg/m²   Estimated body mass index is 38.9 kg/m² as calculated from the following:    Height as of this encounter: 182.9 cm (72\").    Weight as of this encounter: 130 kg (286 lb 12.8 oz).          Physical Exam   General: AAO ×3, no acute distress, pleasant  HEENT: Normocephalic, atraumatic  Cardiovascular: Irregularly irregular.  No appreciable murmur.  No tachycardia noted  Respiratory: Clear to auscultation bilaterally no RRW  Gastrointestinal: Soft nontender nondistended with bowel " sounds present  extremities: No edema  Neurologic: CN II through XII grossly intact   Psychiatric: Normal mood and affect  Result Review :           ECG 12 Lead    Date/Time: 8/31/2022 12:30 PM  Performed by: Kemar Stroud DO  Authorized by: Kemar Stroud DO   Comparison: compared with previous ECG from 2/14/2020  Comparison to previous ECG: Sinus rhythm noted on previous  Rhythm: atrial fibrillation  Rate: normal  Conduction: conduction normal  ST Segments: ST segments normal  T Waves: T waves normal  QRS axis: normal  Other: no other findings    Clinical impression: abnormal EKG  Comments: Atrial fibrillation noted.              Assessment and Plan   Diagnoses and all orders for this visit:    1. New onset atrial fibrillation (HCC) (Primary)  -     Adult Transthoracic Echo Complete W/ Cont if Necessary Per Protocol; Future  -     Holter monitor - 24 hour; Future  -     Ambulatory Referral to Cardiology  -     CBC & Differential  -     Comprehensive Metabolic Panel  -     ECG 12 Lead    2. Shortness of breath  -     XR Chest PA & Lateral; Future    3. Screening for prostate cancer  -     PSA Screen    4. Abnormal glucose  -     Hemoglobin A1c    5. Hyperlipidemia, unspecified hyperlipidemia type  -     Lipid Panel    6. Palpitations  -     Holter monitor - 24 hour; Future  -     ECG 12 Lead    Other orders  -     aspirin (aspirin) 81 MG EC tablet; Take 1 tablet by mouth Daily.  Dispense: 90 tablet; Refill: 1    Patient presenting with new onset atrial fibrillation.  [id number] score is 0.  I will hold off on starting anticoagulation but I will start him on aspirin.  I discussed with him getting a chest x-ray.  We also discussed getting a Holter monitor as well as echocardiogram.  I did discuss with him referral to cardiology.  I will hold off on adding a medication for rate control today as he showed me that his heart rate for the most part is below 100.  He has 1 level that he showed me on his phone  where his heart rate was 107.  He reports 1 level that was 111.  He does sometimes have bradycardia as well so we will hold off on adding a beta-blocker at this time as I do not feel that it is necessary.  I did discuss with him precautions to go to the emergency room should his heart rate remain elevated persistently in the 140s/150s with the development of lower extremity edema and shortness of breath as this can be a sign of decompensation.  Patient verbalized understanding.  Artemio to see him back in 1 month for close follow-up.  Further recommendations to follow once results return.         Follow Up   Return if symptoms worsen or fail to improve.  Patient was given instructions and counseling regarding his condition or for health maintenance advice. Please see specific information pulled into the AVS if appropriate.

## 2022-09-02 ENCOUNTER — DOCUMENTATION (OUTPATIENT)
Dept: PHYSICAL THERAPY | Facility: CLINIC | Age: 57
End: 2022-09-02

## 2022-09-02 NOTE — PROGRESS NOTES
Patient last attended therapy on 8/31/22;   Pt discharged this date from outpatient Physical therapy.  He has been instructed to continue with his HEP and to follow-up with MD as needed.  Pt verbalized good understanding and agreement with plan.

## 2022-10-14 ENCOUNTER — APPOINTMENT (OUTPATIENT)
Dept: CARDIOLOGY | Facility: HOSPITAL | Age: 57
End: 2022-10-14

## 2022-11-10 ENCOUNTER — HOSPITAL ENCOUNTER (OUTPATIENT)
Dept: CARDIOLOGY | Facility: HOSPITAL | Age: 57
Discharge: HOME | End: 2022-11-10

## 2022-11-10 DIAGNOSIS — R00.2 PALPITATIONS: ICD-10-CM

## 2022-11-10 DIAGNOSIS — I48.91 NEW ONSET ATRIAL FIBRILLATION: ICD-10-CM

## 2022-11-10 PROCEDURE — 93306 TTE W/DOPPLER COMPLETE: CPT

## 2022-11-10 PROCEDURE — 93225 XTRNL ECG REC<48 HRS REC: CPT

## 2022-11-10 PROCEDURE — 93306 TTE W/DOPPLER COMPLETE: CPT | Performed by: INTERNAL MEDICINE

## 2022-11-11 ENCOUNTER — OFFICE VISIT (OUTPATIENT)
Dept: FAMILY MEDICINE CLINIC | Facility: CLINIC | Age: 57
End: 2022-11-11

## 2022-11-11 VITALS
OXYGEN SATURATION: 97 % | HEART RATE: 104 BPM | WEIGHT: 291.2 LBS | HEIGHT: 72 IN | BODY MASS INDEX: 39.44 KG/M2 | DIASTOLIC BLOOD PRESSURE: 84 MMHG | SYSTOLIC BLOOD PRESSURE: 140 MMHG | TEMPERATURE: 98.4 F

## 2022-11-11 DIAGNOSIS — J01.40 ACUTE NON-RECURRENT PANSINUSITIS: Primary | ICD-10-CM

## 2022-11-11 DIAGNOSIS — R05.1 ACUTE COUGH: ICD-10-CM

## 2022-11-11 LAB
BH CV ECHO MEAS - AO ROOT DIAM: 3.1 CM
BH CV ECHO MEAS - EF(MOD-BP): 57 %
BH CV ECHO MEAS - IVSD: 1.6 CM
BH CV ECHO MEAS - LA DIMENSION: 4.7 CM
BH CV ECHO MEAS - LAT PEAK E' VEL: 16.5 CM/SEC
BH CV ECHO MEAS - LVIDD: 4.6 CM
BH CV ECHO MEAS - LVIDS: 3 CM
BH CV ECHO MEAS - LVOT DIAM: 2.1 CM
BH CV ECHO MEAS - LVPWD: 1.8 CM
BH CV ECHO MEAS - MED PEAK E' VEL: 11.3 CM/SEC
BH CV ECHO MEAS - MV DEC TIME: 229 MSEC
BH CV ECHO MEAS - MV E MAX VEL: 112 CM/SEC
BH CV ECHO MEAS - RVDD: 3.6 CM
BH CV ECHO MEAS - TR MAX PG: 28 MMHG
BH CV ECHO MEAS - TR MAX VEL: 265 CM/SEC
BH CV ECHO MEASUREMENTS AVERAGE E/E' RATIO: 8.06
EXPIRATION DATE: NORMAL
FLUAV AG UPPER RESP QL IA.RAPID: NOT DETECTED
FLUBV AG UPPER RESP QL IA.RAPID: NOT DETECTED
INTERNAL CONTROL: NORMAL
IVRT: 55 MSEC
LEFT ATRIUM VOLUME INDEX: 19.3 ML/M2
Lab: NORMAL
MAXIMAL PREDICTED HEART RATE: 163 BPM
SARS-COV-2 AG UPPER RESP QL IA.RAPID: NOT DETECTED
STRESS TARGET HR: 139 BPM

## 2022-11-11 PROCEDURE — 87428 SARSCOV & INF VIR A&B AG IA: CPT | Performed by: NURSE PRACTITIONER

## 2022-11-11 PROCEDURE — 99213 OFFICE O/P EST LOW 20 MIN: CPT | Performed by: NURSE PRACTITIONER

## 2022-11-11 RX ORDER — BENZONATATE 200 MG/1
200 CAPSULE ORAL 3 TIMES DAILY PRN
Qty: 30 CAPSULE | Refills: 2 | Status: SHIPPED | OUTPATIENT
Start: 2022-11-11 | End: 2023-01-13

## 2022-11-11 RX ORDER — AMOXICILLIN AND CLAVULANATE POTASSIUM 875; 125 MG/1; MG/1
1 TABLET, FILM COATED ORAL 2 TIMES DAILY
Qty: 14 TABLET | Refills: 0 | Status: SHIPPED | OUTPATIENT
Start: 2022-11-11 | End: 2023-01-13

## 2022-11-11 NOTE — PROGRESS NOTES
"Chief Complaint  Cough, Fever, and Sinusitis    Subjective         Mina Romero presents to [facility]  Sinusitis  This is a new problem. The current episode started in the past 7 days. The problem has been gradually worsening since onset. The maximum temperature recorded prior to his arrival was 100.4 - 100.9 F. The fever has been present for less than 1 day. The pain is moderate. Associated symptoms include chills, congestion, coughing, ear pain, headaches, a hoarse voice, shortness of breath and sinus pressure. Pertinent negatives include no sore throat or swollen glands. Past treatments include oral decongestants, lying down and spray decongestants. The treatment provided mild relief.          Social History     Socioeconomic History   • Marital status:    Tobacco Use   • Smoking status: Former     Packs/day: 1.00     Years: 2.00     Pack years: 2.00     Types: Cigarettes     Quit date:      Years since quittin.8   • Smokeless tobacco: Never   • Tobacco comments:     APPROX 2 YEARS AGO    Vaping Use   • Vaping Use: Never used   Substance and Sexual Activity   • Alcohol use: Never   • Drug use: Never        Objective     Vitals:    22 0756   BP: 140/84   BP Location: Left arm   Patient Position: Sitting   Pulse: 104   Temp: 98.4 °F (36.9 °C)   TempSrc: Oral   SpO2: 97%   Weight: 132 kg (291 lb 3.2 oz)   Height: 182.9 cm (72\")        Body mass index is 39.49 kg/m².    Wt Readings from Last 3 Encounters:   22 132 kg (291 lb 3.2 oz)   22 130 kg (286 lb 12.8 oz)   05/10/22 133 kg (293 lb 12.8 oz)       BP Readings from Last 3 Encounters:   22 140/84   22 126/68   05/10/22 126/82       Physical Exam  Vitals reviewed.   Constitutional:       Appearance: Normal appearance. He is well-developed.   HENT:      Head: Normocephalic and atraumatic.      Right Ear: Tympanic membrane and external ear normal.      Left Ear: Tympanic membrane and " external ear normal.      Nose: Nasal tenderness and congestion present.      Right Sinus: Maxillary sinus tenderness and frontal sinus tenderness present.      Left Sinus: Maxillary sinus tenderness and frontal sinus tenderness present.      Mouth/Throat:      Pharynx: Posterior oropharyngeal erythema present. No oropharyngeal exudate.      Tonsils: 0 on the right. 0 on the left.   Eyes:      Conjunctiva/sclera: Conjunctivae normal.      Pupils: Pupils are equal, round, and reactive to light.   Cardiovascular:      Rate and Rhythm: Normal rate and regular rhythm.      Heart sounds: No murmur heard.    No friction rub. No gallop.   Pulmonary:      Effort: Pulmonary effort is normal.      Breath sounds: Normal breath sounds. No wheezing or rhonchi.   Skin:     General: Skin is warm and dry.   Neurological:      Mental Status: He is alert and oriented to person, place, and time.          Result Review :   The following data was reviewed by: FRITZ Huynh on 11/11/2022:  SARS Antigen   Date Value Ref Range Status   11/11/2022 Not Detected Not Detected, Presumptive Negative Final     Influenza A Antigen STACEY   Date Value Ref Range Status   11/11/2022 Not Detected Not Detected Final     Influenza B Antigen STACEY   Date Value Ref Range Status   11/11/2022 Not Detected Not Detected Final     Internal Control   Date Value Ref Range Status   11/11/2022 Passed Passed Final     Lot Number   Date Value Ref Range Status   11/11/2022 [id number]  Final     Expiration Date   Date Value Ref Range Status   11/11/2022 03/02/2023  Final       Procedures    Assessment and Plan   Diagnoses and all orders for this visit:    1. Acute non-recurrent pansinusitis (Primary)  -     POCT SARS-CoV-2 Antigen STACEY + Flu  -     amoxicillin-clavulanate (Augmentin) 875-125 MG per tablet; Take 1 tablet by mouth 2 (Two) Times a Day.  Dispense: 14 tablet; Refill: 0    2. Acute cough  -     POCT SARS-CoV-2 Antigen STACEY + Flu  -     benzonatate (TESSALON)  200 MG capsule; Take 1 capsule by mouth 3 (Three) Times a Day As Needed for Cough.  Dispense: 30 capsule; Refill: 2      Continue taking Zyrtec Mucinex and Sudafed.  Discussed using Afrin for the next few days.  Prescribed Augmentin take twice daily for the next week.  Also Tessalon Perles for cough.  Discussed symptomatic treatment including throat lozenges warm fluids and rest.      Follow Up   Return if symptoms worsen or fail to improve.  Patient was given instructions and counseling regarding his condition or for health maintenance advice. Please see specific information pulled into the AVS if appropriate.     Please note that portions of this note were completed with a voice recognition program.

## 2022-11-17 ENCOUNTER — OFFICE VISIT (OUTPATIENT)
Dept: CARDIOLOGY | Facility: CLINIC | Age: 57
End: 2022-11-17

## 2022-11-17 ENCOUNTER — PREP FOR SURGERY (OUTPATIENT)
Dept: OTHER | Facility: HOSPITAL | Age: 57
End: 2022-11-17

## 2022-11-17 VITALS
SYSTOLIC BLOOD PRESSURE: 120 MMHG | BODY MASS INDEX: 40.23 KG/M2 | WEIGHT: 297 LBS | HEIGHT: 72 IN | HEART RATE: 75 BPM | DIASTOLIC BLOOD PRESSURE: 78 MMHG

## 2022-11-17 DIAGNOSIS — I48.0 PAROXYSMAL ATRIAL FIBRILLATION: Primary | ICD-10-CM

## 2022-11-17 DIAGNOSIS — R42 POSTURAL DIZZINESS: ICD-10-CM

## 2022-11-17 DIAGNOSIS — E78.2 MIXED HYPERLIPIDEMIA: ICD-10-CM

## 2022-11-17 DIAGNOSIS — R53.83 OTHER FATIGUE: ICD-10-CM

## 2022-11-17 PROCEDURE — 99204 OFFICE O/P NEW MOD 45 MIN: CPT | Performed by: INTERNAL MEDICINE

## 2022-11-17 NOTE — PROGRESS NOTES
Chief Complaint  New onset Atrial fibrillation    Subjective        Mina Romero presents to [facility]  History of present illness:    Patient is a 57-year-old male with past medical history significant for hyperlipidemia.  He states back in August his apple watch started showing that he was in atrial fibrillation.  He gets multiple times a day an alert of either his heart rate high or low.  He does note since this time he is not felt normal.  He does feel more rundown and tired.  He also notes periodic times where he will feel dizzy.  He has a hard time kind of describing this sensation.  He went to his primary care physician who did an EKG that showed coarse atrial fibrillation at 73 bpm.  He is on no rate controlling medications.  He had an echocardiogram August 31, 2022 that showed mild left ventricular hypertrophy.  Normal biventricular function.  Borderline right atrial enlargement and mild to moderate left atrial enlargement.  He had a Holter monitor placed in the past but the results are not back.  He has a remote smoking history.  No alcohol.  Father had a history of bypass surgery.      Past Medical History:   Diagnosis Date   • ADHD    • Allergic rhinitis 03/05/2019   • Allergies    • Anxiety    • Arthritis    • Asthma    • COVID-19 01/20/2021   • Emotional depression    • Head injury     X3 IN [place]   • High blood pressure    • High cholesterol    • Sinus trouble    • Tic          Past Surgical History:   Procedure Laterality Date   • COLONOSCOPY     • COLONOSCOPY N/A 10/14/2021    Procedure: COLONOSCOPY;  Surgeon: Riley Ryder MD;  Location: [facility];  Service: Gastroenterology;  Laterality: N/A;  DIVERTICULOSIS, HEMORRHOIDS   • INGUINAL HERNIA REPAIR Bilateral    • SHOULDER SURGERY  1995   • TONSILLECTOMY  1976          Social History     Socioeconomic History   • Marital status:    Tobacco Use   • Smoking status: Former     Packs/day: 1.00      Years: 2.00     Pack years: 2.00     Types: Cigarettes     Quit date:      Years since quittin.8   • Smokeless tobacco: Never   • Tobacco comments:     APPROX 20 YEARS AGO    Vaping Use   • Vaping Use: Never used   Substance and Sexual Activity   • Alcohol use: Never   • Drug use: Never         Family History   Problem Relation Age of Onset   • Diabetes Mother    • Stroke Father    • Heart disease Father    • Diabetes Father    • Anxiety disorder Father    • Depression Father    • Heart disease Other         AUNT/UNCLE   • Anxiety disorder Sister           Allergies   Allergen Reactions   • Latex Rash            Current Outpatient Medications:   •  amoxicillin-clavulanate (Augmentin) 875-125 MG per tablet, Take 1 tablet by mouth 2 (Two) Times a Day., Disp: 14 tablet, Rfl: 0  •  ARIPiprazole (ABILIFY) 5 MG tablet, Take 5 mg by mouth. 0.5 tab qd, Disp: , Rfl:   •  aspirin (aspirin) 81 MG EC tablet, Take 1 tablet by mouth Daily., Disp: 90 tablet, Rfl: 1  •  benzonatate (TESSALON) 200 MG capsule, Take 1 capsule by mouth 3 (Three) Times a Day As Needed for Cough., Disp: 30 capsule, Rfl: 2  •  cetirizine (zyrTEC) 10 MG tablet, 10 mg., Disp: , Rfl:   •  DULoxetine (CYMBALTA) 60 MG capsule, 60 mg Daily., Disp: , Rfl:   •  fluticasone (FLONASE) 50 MCG/ACT nasal spray, 2 sprays into the nostril(s) as directed by provider Every Night., Disp: , Rfl:   •  guanFACINE HCl ER (INTUNIV) 1 MG tablet sustained-release 24 hour, Take 2 mg by mouth Every Night for 60 days., Disp: 60 tablet, Rfl: 1  •  ibuprofen (ADVIL,MOTRIN) 800 MG tablet, Take 800 mg by mouth Every 6 (Six) Hours As Needed., Disp: , Rfl:   •  mirtazapine (REMERON) 30 MG tablet, Take  by mouth. 0.5 tab qd, Disp: , Rfl:   •  montelukast (SINGULAIR) 10 MG tablet, Take 10 mg by mouth At Night As Needed., Disp: , Rfl:   •  prazosin (MINIPRESS) 2 MG capsule, Take 2 mg by mouth Every Night., Disp: , Rfl:   •  rosuvastatin (CRESTOR) 10 MG tablet, Take 10 mg by mouth  "Every Night., Disp: , Rfl:   •  sildenafil (VIAGRA) 100 MG tablet, , Disp: , Rfl:   •  urea (CARMOL) 20 % cream, Apply as needed Twice daily, Disp: 85 g, Rfl: 3      ROS:  Cardiac review of systems positive for fatigue and dizziness    Objective     /78   Pulse 75   Ht 182.9 cm (72\")   Wt 135 kg (297 lb)   BMI 40.28 kg/m²       General Appearance:   · well developed  · well nourished  HENT:   · oropharynx moist  · lips not cyanotic  Respiratory:  · no respiratory distress  · normal breath sounds  · no rales  Cardiovascular:  · no jugular venous distention  · regular rhythm  · S1 normal, S2 normal  · no S3, no S4   · no murmur  · no rub, no thrill  · No carotid bruit  · pedal pulses normal  · lower extremity edema: none    Musculoskeletal:  · no clubbing of fingers.   · normocephalic, head atraumatic  Skin:   · warm, dry  Psychiatric:  · judgement and insight appropriate  · normal mood and affect    ECHO:  Results for orders placed during the hospital encounter of 11/10/22    Adult Transthoracic Echo Complete W/ Cont if Necessary Per Protocol    Interpretation Summary  •  Left ventricular ejection fraction appears to be 61 - 65%.  •  Left ventricular wall thickness is consistent with mild concentric hypertrophy.  •  Left ventricular diastolic function was indeterminate.  •  The right ventricular cavity is borderline dilated.  •  The right atrial cavity is borderline dilated.  The left atrial cavity is mild to moderately dilated.  •  There is calcification of the aortic valve.  •  No significant valvular disease.    STRESS:    CATH:  No results found for this or any previous visit.    BMP:   Glucose   Date Value Ref Range Status   08/31/2022 73 65 - 99 mg/dL Final     BUN   Date Value Ref Range Status   08/31/2022 17 6 - 20 mg/dL Final     Creatinine   Date Value Ref Range Status   08/31/2022 1.21 0.76 - 1.27 mg/dL Final     Sodium   Date Value Ref Range Status   08/31/2022 139 136 - 145 mmol/L Final "     Potassium   Date Value Ref Range Status   08/31/2022 4.2 3.5 - 5.2 mmol/L Final     Chloride   Date Value Ref Range Status   08/31/2022 105 98 - 107 mmol/L Final     CO2   Date Value Ref Range Status   08/31/2022 25.2 22.0 - 29.0 mmol/L Final     Calcium   Date Value Ref Range Status   08/31/2022 9.3 8.6 - 10.5 mg/dL Final     BUN/Creatinine Ratio   Date Value Ref Range Status   08/31/2022 14.0 7.0 - 25.0 Final     Anion Gap   Date Value Ref Range Status   08/31/2022 8.8 5.0 - 15.0 mmol/L Final     eGFR   Date Value Ref Range Status   08/31/2022 69.8 >60.0 mL/min/1.73 Final     Comment:     National Kidney Foundation and American Society of Nephrology (ASN) Task Force recommended calculation based on the Chronic Kidney Disease Epidemiology Collaboration (CKD-EPI) equation refit without adjustment for race.     LIPIDS:  Total Cholesterol   Date Value Ref Range Status   08/31/2022 165 0 - 200 mg/dL Final     Triglycerides   Date Value Ref Range Status   08/31/2022 291 (H) 0 - 150 mg/dL Final     HDL Cholesterol   Date Value Ref Range Status   08/31/2022 32 (L) 40 - 60 mg/dL Final     LDL Cholesterol    Date Value Ref Range Status   08/31/2022 85 0 - 100 mg/dL Final     VLDL Cholesterol   Date Value Ref Range Status   08/31/2022 48 (H) 5 - 40 mg/dL Final     LDL/HDL Ratio   Date Value Ref Range Status   08/31/2022 2.34  Final         Procedures             ASSESSMENT:  Encounter Diagnoses   Name Primary?   • Paroxysmal atrial fibrillation (HCC) Yes   • Mixed hyperlipidemia    • Other fatigue    • Postural dizziness          PLAN:    1.  Patient does sound like he is symptomatic in the atrial fibrillation.  His [id number] score is 0.  I have though asked him to start Eliquis 5 mg p.o. twice daily and we will plan on a MAGDIEL/DC cardioversion to get him back into a normal sinus rhythm.  We would then continue the Eliquis for 4 weeks and then stop it.  We will be able to see what symptoms resolve if we can get him into  a normal sinus rhythm when he stays in sinus.  If he does feel much better we will have to watch to see if he goes back into atrial fibrillation.  If he does then we would have to consider either an antiarrhythmic drug or more likely evaluation for pulmonary vein ablation.  2.  Patient's echocardiogram 8/31/2022 showed mild to moderate left atrial enlargement.  3.  Continue the Crestor.  4.  We will try to get the Holter monitor report from the hospital.    Was able to look at patient's Holter monitor now.  He was monitored for 23 hours, 26 minutes and 40 seconds.  Lowest heart rate was 58 bpm.  Average heart rate was 92 bpm, and maximum heart rate was 152 bpm.  He was in atrial fibrillation 100% of the time.  There was rare PVC.  No patient activated events.      Patient was given instructions and counseling regarding his condition or for health maintenance advice. Please see specific information pulled into the AVS if appropriate.         Grant Christine MD   11/17/2022  14:41 EST

## 2022-11-22 ENCOUNTER — TELEPHONE (OUTPATIENT)
Dept: CARDIOLOGY | Facility: CLINIC | Age: 57
End: 2022-11-22

## 2022-11-23 LAB
MAXIMAL PREDICTED HEART RATE: 163 BPM
STRESS TARGET HR: 139 BPM

## 2022-12-01 ENCOUNTER — APPOINTMENT (OUTPATIENT)
Dept: CARDIOLOGY | Facility: HOSPITAL | Age: 57
End: 2022-12-01

## 2022-12-01 NOTE — TELEPHONE ENCOUNTER
Toya at the hospital should be taking care of this. Per Dr. Christine. Can you please reach out to her and make sure she gets him scheduled for a cardioversion? Thanks

## 2022-12-27 PROCEDURE — 93005 ELECTROCARDIOGRAM TRACING: CPT

## 2022-12-28 ENCOUNTER — HOSPITAL ENCOUNTER (OUTPATIENT)
Dept: CARDIOLOGY | Facility: HOSPITAL | Age: 57
Discharge: HOME | End: 2022-12-28

## 2022-12-28 VITALS
WEIGHT: 300 LBS | DIASTOLIC BLOOD PRESSURE: 87 MMHG | SYSTOLIC BLOOD PRESSURE: 126 MMHG | HEART RATE: 77 BPM | RESPIRATION RATE: 16 BRPM | HEIGHT: 72 IN | OXYGEN SATURATION: 93 % | BODY MASS INDEX: 40.63 KG/M2

## 2022-12-28 DIAGNOSIS — I48.0 PAROXYSMAL ATRIAL FIBRILLATION: ICD-10-CM

## 2022-12-28 LAB
MAXIMAL PREDICTED HEART RATE: 163 BPM
MAXIMAL PREDICTED HEART RATE: 163 BPM
STRESS TARGET HR: 139 BPM
STRESS TARGET HR: 139 BPM

## 2022-12-28 PROCEDURE — 93005 ELECTROCARDIOGRAM TRACING: CPT

## 2022-12-28 PROCEDURE — 0 MEPERIDINE PER 100 MG: Performed by: INTERNAL MEDICINE

## 2022-12-28 PROCEDURE — 93312 ECHO TRANSESOPHAGEAL: CPT

## 2022-12-28 PROCEDURE — 93312 ECHO TRANSESOPHAGEAL: CPT | Performed by: INTERNAL MEDICINE

## 2022-12-28 PROCEDURE — 92960 CARDIOVERSION ELECTRIC EXT: CPT

## 2022-12-28 PROCEDURE — 93320 DOPPLER ECHO COMPLETE: CPT

## 2022-12-28 PROCEDURE — 25010000002 MIDAZOLAM HCL (PF) 10 MG/2ML SOLUTION: Performed by: INTERNAL MEDICINE

## 2022-12-28 PROCEDURE — 93325 DOPPLER ECHO COLOR FLOW MAPG: CPT

## 2022-12-28 PROCEDURE — 93320 DOPPLER ECHO COMPLETE: CPT | Performed by: INTERNAL MEDICINE

## 2022-12-28 PROCEDURE — 92960 CARDIOVERSION ELECTRIC EXT: CPT | Performed by: INTERNAL MEDICINE

## 2022-12-28 PROCEDURE — 93325 DOPPLER ECHO COLOR FLOW MAPG: CPT | Performed by: INTERNAL MEDICINE

## 2022-12-28 PROCEDURE — 93010 ELECTROCARDIOGRAM REPORT: CPT | Performed by: INTERNAL MEDICINE

## 2022-12-28 RX ORDER — MEPERIDINE HYDROCHLORIDE 25 MG/ML
INJECTION INTRAMUSCULAR; INTRAVENOUS; SUBCUTANEOUS
Status: COMPLETED | OUTPATIENT
Start: 2022-12-28 | End: 2022-12-28

## 2022-12-28 RX ORDER — MIDAZOLAM HYDROCHLORIDE 10 MG/2ML
INJECTION, SOLUTION INTRAMUSCULAR; INTRAVENOUS
Status: COMPLETED | OUTPATIENT
Start: 2022-12-28 | End: 2022-12-28

## 2022-12-28 RX ADMIN — MEPERIDINE HYDROCHLORIDE 25 MG: 25 INJECTION INTRAMUSCULAR; INTRAVENOUS; SUBCUTANEOUS at 08:43

## 2022-12-28 RX ADMIN — MIDAZOLAM HYDROCHLORIDE 2 MG: 5 INJECTION, SOLUTION INTRAMUSCULAR; INTRAVENOUS at 09:02

## 2022-12-28 RX ADMIN — MIDAZOLAM HYDROCHLORIDE 2 MG: 5 INJECTION, SOLUTION INTRAMUSCULAR; INTRAVENOUS at 08:51

## 2022-12-28 RX ADMIN — TOPICAL ANESTHETIC 1 SPRAY: 200 SPRAY DENTAL; PERIODONTAL at 08:41

## 2022-12-28 RX ADMIN — MEPERIDINE HYDROCHLORIDE 25 MG: 25 INJECTION INTRAMUSCULAR; INTRAVENOUS; SUBCUTANEOUS at 09:02

## 2022-12-28 RX ADMIN — MEPERIDINE HYDROCHLORIDE 25 MG: 25 INJECTION INTRAMUSCULAR; INTRAVENOUS; SUBCUTANEOUS at 08:51

## 2022-12-28 RX ADMIN — MIDAZOLAM HYDROCHLORIDE 4 MG: 5 INJECTION, SOLUTION INTRAMUSCULAR; INTRAVENOUS at 08:42

## 2022-12-28 RX ADMIN — MEPERIDINE HYDROCHLORIDE 25 MG: 25 INJECTION INTRAMUSCULAR; INTRAVENOUS; SUBCUTANEOUS at 08:46

## 2022-12-28 RX ADMIN — APIXABAN 5 MG: 5 TABLET, FILM COATED ORAL at 08:00

## 2022-12-28 RX ADMIN — MIDAZOLAM HYDROCHLORIDE 2 MG: 5 INJECTION, SOLUTION INTRAMUSCULAR; INTRAVENOUS at 08:46

## 2022-12-28 NOTE — DISCHARGE INSTRUCTIONS
Echo Lab Phone Number: [phone]    Do not eat or drink anything until 10:41 am. Begin with sips of cool water before trying hot liquid to be sure throat numbness has worn off.  A responsible adult should drive you home and stay with you today and tonight.  Do not drive a car or operate any hazardous machinery for 24 hours.  Do not drink any alcoholic beverages for 24 hours.  Do not make any legal decisions for 24 hours after sedation.  Do not engage in any strenuous activity.  Resume your medications, following prescribed instructions.  Contact your caregiver if you have questions or concerns about your care.    In the event of an emergency, call 911 or go to your nearest emergency room.    You received the following medications during your procedure: versed, demerol, hurricane spray

## 2022-12-30 LAB — QT INTERVAL: 366 MS

## 2023-01-13 ENCOUNTER — OFFICE VISIT (OUTPATIENT)
Dept: CARDIOLOGY | Facility: CLINIC | Age: 58
End: 2023-01-13
Payer: OTHER GOVERNMENT

## 2023-01-13 VITALS
BODY MASS INDEX: 41.99 KG/M2 | HEIGHT: 72 IN | WEIGHT: 310 LBS | DIASTOLIC BLOOD PRESSURE: 79 MMHG | HEART RATE: 80 BPM | SYSTOLIC BLOOD PRESSURE: 119 MMHG

## 2023-01-13 DIAGNOSIS — E78.00 HIGH CHOLESTEROL: ICD-10-CM

## 2023-01-13 DIAGNOSIS — I10 PRIMARY HYPERTENSION: ICD-10-CM

## 2023-01-13 DIAGNOSIS — I48.0 PAROXYSMAL ATRIAL FIBRILLATION: Primary | ICD-10-CM

## 2023-01-13 PROBLEM — E78.2 MIXED HYPERLIPIDEMIA: Status: ACTIVE | Noted: 2023-01-13

## 2023-01-13 PROCEDURE — 99214 OFFICE O/P EST MOD 30 MIN: CPT

## 2023-01-13 NOTE — PROGRESS NOTES
Chief Complaint  PAF, Hyperlipidemia, and Postural dizziness    Subjective        History of Present Illness  Mina Romero presents to [facility] CARDIOLOGY   Mina is a 57-year-old  male presents for follow-up.  He has a past medical history significant for hypertension, hyperlipidemia and paroxysmal atrial fibrillation status post successful MAGDIEL cardioversion on December 28, 2022.  He has no complaints from a cardiac standpoint.  He denies any further episodes of atrial fibrillation and keeps a apple watch device on to monitor for it.  He denies any chest pain or discomfort,  dyspnea, orthopnea, edema, syncope or presyncope.  PMH  Past Medical History:   Diagnosis Date   • ADHD    • Allergic rhinitis 03/05/2019   • Allergies    • Anxiety    • Arthritis    • Asthma    • COVID-19 01/20/2021   • Emotional depression    • Head injury     X3 IN IRAQ   • High blood pressure    • High cholesterol    • Sinus trouble    • Tic          ALLERGY  Allergies   Allergen Reactions   • Latex Rash          SURGICALHX  Past Surgical History:   Procedure Laterality Date   • COLONOSCOPY     • COLONOSCOPY N/A 10/14/2021    Procedure: COLONOSCOPY;  Surgeon: Riley Ryder MD;  Location: [facility] ENDOSCOPY;  Service: Gastroenterology;  Laterality: N/A;  DIVERTICULOSIS, HEMORRHOIDS   • INGUINAL HERNIA REPAIR Bilateral    • SHOULDER SURGERY  1995   • TONSILLECTOMY  1976          SOC  Social History     Socioeconomic History   • Marital status:    Tobacco Use   • Smoking status: Former     Packs/day: 1.00     Years: 2.00     Pack years: 2.00     Types: Cigarettes     Quit date: 2008     Years since quitting: 15.0   • Smokeless tobacco: Never   • Tobacco comments:     APPROX 20 YEARS AGO    Vaping Use   • Vaping Use: Never used   Substance and Sexual Activity   • Alcohol use: Never   • Drug use: Never         FAMHX  Family History   Problem Relation Age of Onset   • Diabetes Mother    • Stroke Father   "  • Heart disease Father    • Diabetes Father    • Anxiety disorder Father    • Depression Father    • Heart disease Other         AUNT/UNCLE   • Anxiety disorder Sister           MEDSIGONLY  Current Outpatient Medications on File Prior to Visit   Medication Sig   • aspirin (aspirin) 81 MG EC tablet Take 1 tablet by mouth Daily.   • cetirizine (zyrTEC) 10 MG tablet 10 mg.   • DULoxetine (CYMBALTA) 60 MG capsule 60 mg Daily.   • fluticasone (FLONASE) 50 MCG/ACT nasal spray 2 sprays into the nostril(s) as directed by provider Every Night.   • ibuprofen (ADVIL,MOTRIN) 800 MG tablet Take 800 mg by mouth Every 6 (Six) Hours As Needed.   • mirtazapine (REMERON) 30 MG tablet Take  by mouth. 0.5 tab qd   • montelukast (SINGULAIR) 10 MG tablet Take 10 mg by mouth At Night As Needed.   • prazosin (MINIPRESS) 2 MG capsule Take 2 mg by mouth Every Night.   • rosuvastatin (CRESTOR) 10 MG tablet Take 10 mg by mouth Every Night.   • sildenafil (VIAGRA) 100 MG tablet    • urea (CARMOL) 20 % cream Apply as needed Twice daily   • [DISCONTINUED] amoxicillin-clavulanate (Augmentin) 875-125 MG per tablet Take 1 tablet by mouth 2 (Two) Times a Day.   • [DISCONTINUED] apixaban (ELIQUIS) 5 MG tablet tablet Take 1 tablet by mouth 2 (Two) Times a Day. Indications: [id number] EXP Srw0000  Box 4 Eliquis 5mg   • [DISCONTINUED] ARIPiprazole (ABILIFY) 5 MG tablet Take 5 mg by mouth. 0.5 tab qd   • [DISCONTINUED] benzonatate (TESSALON) 200 MG capsule Take 1 capsule by mouth 3 (Three) Times a Day As Needed for Cough.   • [DISCONTINUED] guanFACINE HCl ER (INTUNIV) 1 MG tablet sustained-release 24 hour Take 2 mg by mouth Every Night for 60 days.     No current facility-administered medications on file prior to visit.         Objective   Vitals:    01/13/23 1302   BP: 119/79   Pulse: 80   Weight: (!) 141 kg (310 lb)   Height: 182.9 cm (72\")         Physical Exam  Constitutional:       General: He is awake. He is not in acute distress.     Appearance: " Normal appearance.   HENT:      Head: Normocephalic.      Nose: Nose normal. No congestion.   Eyes:      Extraocular Movements: Extraocular movements intact.      Conjunctiva/sclera: Conjunctivae normal.      Pupils: Pupils are equal, round, and reactive to light.   Neck:      Thyroid: No thyromegaly.      Vascular: No JVD.   Cardiovascular:      Rate and Rhythm: Normal rate and regular rhythm.      Chest Wall: PMI is not displaced.      Pulses: Normal pulses.      Heart sounds: Normal heart sounds, S1 normal and S2 normal. No murmur heard.    No friction rub. No gallop. No S3 or S4 sounds.   Pulmonary:      Effort: Pulmonary effort is normal.      Breath sounds: Normal breath sounds. No wheezing, rhonchi or rales.   Abdominal:      General: Bowel sounds are normal.      Palpations: Abdomen is soft.      Tenderness: There is no abdominal tenderness.   Musculoskeletal:      Cervical back: No tenderness.      Right lower leg: No edema.      Left lower leg: No edema.   Lymphadenopathy:      Cervical: No cervical adenopathy.   Skin:     General: Skin is warm and dry.      Capillary Refill: Capillary refill takes less than 2 seconds.      Coloration: Skin is not cyanotic.      Findings: No petechiae or rash.      Nails: There is no clubbing.   Neurological:      Mental Status: He is alert.   Psychiatric:         Mood and Affect: Mood normal.         Behavior: Behavior is cooperative.           Result Review     The following data was reviewed by FRITZ Ramos on 01/13/23.    No results found for: PROBNP  CMP    CMP 8/31/22   Glucose 73   BUN 17   Creatinine 1.21   eGFR 69.8   Sodium 139   Potassium 4.2   Chloride 105   Calcium 9.3   Total Protein 6.4   Albumin 4.10   Globulin 2.3   Total Bilirubin 0.4   Alkaline Phosphatase 82   AST (SGOT) 30   ALT (SGPT) 31   Albumin/Globulin Ratio 1.8   BUN/Creatinine Ratio 14.0   Anion Gap 8.8      Comments are available for some flowsheets but are not being displayed.            CBC w/diff    CBC w/Diff 8/31/22   WBC 6.59   RBC 5.17   Hemoglobin 15.3   Hematocrit 44.8   MCV 86.7   MCH 29.6   MCHC 34.2   RDW 12.7   Platelets 227   Neutrophil Rel % 63.5   Immature Granulocyte Rel % 0.3   Lymphocyte Rel % 25.9   Monocyte Rel % 8.6   Eosinophil Rel % 1.2   Basophil Rel % 0.5            No results found for: TSH   Lipid Panel    Lipid Panel 8/31/22   Total Cholesterol 165   Triglycerides 291 (A)   HDL Cholesterol 32 (A)   VLDL Cholesterol 48 (A)   LDL Cholesterol  85   LDL/HDL Ratio 2.34   (A) Abnormal value              Results for orders placed during the hospital encounter of 12/28/22    Adult Transesophageal Echo (MAGDIEL) W/ Cont if Necessary Per Protocol    Interpretation Summary  •  Left ventricular ejection fraction appears to be 51 - 55%.  •  Left ventricular diastolic function was indeterminate.  •  The right ventricular cavity is borderline dilated.  •  The right atrial cavity is borderline dilated.  The right atrial cavity is mildly dilated.  •  There is calcification of the aortic valve.  No significant AAS or AI.  •  Left atrial appendage shows no evidence of thrombus and normal velocities.             Assessment and Plan   Diagnoses and all orders for this visit:    1. Paroxysmal atrial fibrillation (HCC) (Primary)  Assessment & Plan:  Status post successful MAGDIEL cardioversion on December 28, 2022.  He is no longer taking the Eliquis.  He was advised to notify us if he notices any further episodes of atrial fibrillation.  He has an irregular rhythm in the office today.      2. Primary hypertension  Assessment & Plan:  Stable.  Continue the same.      3. High cholesterol  Assessment & Plan:  LDL is 85-goal LDL less than 70.  Continue rosuvastatin 10 mg daily.  Triglycerides are elevated.  Encourage diet and lifestyle modifications.  May consider adding fenofibrate at follow-up if this remains elevated.            Follow Up   Return in about 6 months (around 7/13/2023) for With  Detail Level: Zone Detail Level: Detailed Dr. Christine.    Patient was given instructions and counseling regarding his condition or for health maintenance advice. Please see specific information pulled into the AVS if appropriate.     Ericka Robertson, APRN  01/13/23  13:24 EST    Dictated Utilizing Dragon Dictation

## 2023-01-13 NOTE — ASSESSMENT & PLAN NOTE
Status post successful MAGDIEL cardioversion on December 28, 2022.  He is no longer taking the Eliquis.  He was advised to notify us if he notices any further episodes of atrial fibrillation.  He has an irregular rhythm in the office today.

## 2023-01-13 NOTE — ASSESSMENT & PLAN NOTE
LDL is 85-goal LDL less than 70.  Continue rosuvastatin 10 mg daily.  Triglycerides are elevated.  Encourage diet and lifestyle modifications.  May consider adding fenofibrate at follow-up if this remains elevated.

## 2023-01-23 RX ORDER — UREA 200 MG/G
GEL TOPICAL
Qty: 85 G | Refills: 3 | Status: SHIPPED | OUTPATIENT
Start: 2023-01-23

## 2023-01-23 RX ORDER — ASPIRIN 81 MG/1
81 TABLET ORAL DAILY
Qty: 90 TABLET | Refills: 1 | Status: SHIPPED | OUTPATIENT
Start: 2023-01-23

## 2023-03-06 ENCOUNTER — TELEPHONE (OUTPATIENT)
Dept: FAMILY MEDICINE CLINIC | Facility: CLINIC | Age: 58
End: 2023-03-06

## 2023-03-06 NOTE — TELEPHONE ENCOUNTER
Caller: Mina Romero    Relationship to patient: Self    Best call back number: [phone]    Chief complaint: RIGHT ANKLE PAIN     Type of visit: OFFICE VISIT     Requested date: ASAP     Additional notes: PATIENT STATED HE CAN BARLEY WALK DUE TO SEVERE PAIN. PLEASE ADVISE

## 2023-03-07 ENCOUNTER — OFFICE VISIT (OUTPATIENT)
Dept: FAMILY MEDICINE CLINIC | Facility: CLINIC | Age: 58
End: 2023-03-07
Payer: OTHER GOVERNMENT

## 2023-03-07 VITALS
DIASTOLIC BLOOD PRESSURE: 70 MMHG | TEMPERATURE: 98.2 F | HEIGHT: 72 IN | OXYGEN SATURATION: 97 % | SYSTOLIC BLOOD PRESSURE: 124 MMHG | WEIGHT: 315 LBS | BODY MASS INDEX: 42.66 KG/M2 | HEART RATE: 86 BPM

## 2023-03-07 DIAGNOSIS — S86.001A INJURY OF RIGHT ACHILLES TENDON, INITIAL ENCOUNTER: Primary | ICD-10-CM

## 2023-03-07 DIAGNOSIS — M76.61 TENDONITIS, ACHILLES, RIGHT: ICD-10-CM

## 2023-03-07 PROCEDURE — 99213 OFFICE O/P EST LOW 20 MIN: CPT | Performed by: FAMILY MEDICINE

## 2023-03-07 NOTE — PROGRESS NOTES
"Chief Complaint  Right Achilles tendon pain    Subjective        Mina Romero presents to [facility]  History of Present Illness  Patient presents today complaining of pain in the back of the heel area in the area of the Achilles tendon.  He reports he was walking his dog on Saturday which was on 3/4/2023.  He reports that the next day he noticed that his ankle was bothering him.  Particularly this was bothering him in the area of Achilles tendon on the medial aspect.  He has had some slight swelling.  Denies any discoloration of skin.  Symptoms have persisted so he presents today for further evaluation.  He reports that his Achilles tendon feels tight and this is created some difficulty with walking.  Objective   Vital Signs:  /70 (BP Location: Right arm, Patient Position: Sitting)   Pulse 86   Temp 98.2 °F (36.8 °C) (Oral)   Ht 182.9 cm (72\")   Wt (!) 144 kg (318 lb)   SpO2 97%   BMI 43.13 kg/m²   Estimated body mass index is 43.13 kg/m² as calculated from the following:    Height as of this encounter: 182.9 cm (72\").    Weight as of this encounter: 144 kg (318 lb).             Physical Exam   General appearance: Pleasant, nonagitated, no acute distress  Musculoskeletal: Negative Arreola's test on the right however there is some swelling noted at the Achilles tendon insertion.  The tendon overall feels intact.  I do not appreciate a complete rupture.  Patient does have pain when dorsiflexing the right foot in the area of the insertion of the Achilles tendon.  No pain with plantarflexion over the lateral aspect of the right ankle.  Result Review :                   Assessment and Plan   Diagnoses and all orders for this visit:    1. Injury of right Achilles tendon, initial encounter (Primary)  -     MRI Ankle Right Without Contrast; Future    2. Tendonitis, Achilles, right  -     MRI Ankle Right Without Contrast; Future    I am concerned about Achilles tendon injury.  " I discussed with patient using a cam walker boot for the next 4 to 6 weeks.  I would like to see him back for follow-up in 4 weeks to monitor progression.  MRI has been ordered to further evaluate as there is concern about a partial rupture.  I discussed using the boot regularly throughout the day with activities.  We discussed RICE as well.         Follow Up   Return in about 4 weeks (around 4/4/2023) for achilles tendon injury.  Patient was given instructions and counseling regarding his condition or for health maintenance advice. Please see specific information pulled into the AVS if appropriate.

## 2023-03-07 NOTE — PROGRESS NOTES
"Chief Complaint  Follow-up and Ankle Pain (Radiates to the heel)    Subjective    {Problem List  Visit Diagnosis   Encounters  Notes  Medications  Labs  Result Review Imaging  Media :23}    Mina Romero presents to [facility]  History of Present Illness    Objective   Vital Signs:  /70 (BP Location: Right arm, Patient Position: Sitting)   Pulse 86   Temp 98.2 °F (36.8 °C) (Oral)   Ht 182.9 cm (72\")   Wt (!) 144 kg (318 lb)   SpO2 97%   BMI 43.13 kg/m²   Estimated body mass index is 43.13 kg/m² as calculated from the following:    Height as of this encounter: 182.9 cm (72\").    Weight as of this encounter: 144 kg (318 lb).       {Class 3 Severe Obesity (BMI >=40). (Optional):87340}      Physical Exam   Result Review :{Labs  Result Review  Imaging  Med Tab  Media  Procedures  :23}  {The following data was reviewed by (Optional):93867}  {Ambulatory Labs (Optional):08797}  {Data reviewed (Optional):22090:::1}             Assessment and Plan {CC Problem List  Visit Diagnosis   ROS  Review (Popup)  Health Maintenance  Quality  BestPractice  Medications  SmartSets  SnapShot Encounters  Media :23}  Diagnoses and all orders for this visit:    1. Injury of right Achilles tendon, initial encounter (Primary)  -     MRI Ankle Right Without Contrast; Future    2. Tendonitis, Achilles, right  -     MRI Ankle Right Without Contrast; Future           {Time Spent (Optional):17842}  Follow Up {Instructions Charge Capture  Follow-up Communications :23}  Return in about 4 weeks (around 4/4/2023) for achilles tendon injury.  Patient was given instructions and counseling regarding his condition or for health maintenance advice. Please see specific information pulled into the AVS if appropriate.       "

## 2023-04-06 ENCOUNTER — HOSPITAL ENCOUNTER (OUTPATIENT)
Dept: MRI IMAGING | Facility: HOSPITAL | Age: 58
Discharge: HOME | End: 2023-04-06
Admitting: FAMILY MEDICINE
Payer: OTHER GOVERNMENT

## 2023-04-06 DIAGNOSIS — M76.61 TENDONITIS, ACHILLES, RIGHT: ICD-10-CM

## 2023-04-06 DIAGNOSIS — S86.001A INJURY OF RIGHT ACHILLES TENDON, INITIAL ENCOUNTER: ICD-10-CM

## 2023-04-06 PROCEDURE — 73721 MRI JNT OF LWR EXTRE W/O DYE: CPT

## 2023-04-11 DIAGNOSIS — M76.60 INSERTIONAL ACHILLES TENDINOPATHY: Primary | ICD-10-CM

## 2023-04-11 DIAGNOSIS — M79.671 PAIN OF RIGHT HEEL: ICD-10-CM

## 2023-05-17 ENCOUNTER — TREATMENT (OUTPATIENT)
Dept: PHYSICAL THERAPY | Facility: CLINIC | Age: 58
End: 2023-05-17
Payer: OTHER GOVERNMENT

## 2023-05-17 DIAGNOSIS — R29.898 ANKLE WEAKNESS: ICD-10-CM

## 2023-05-17 DIAGNOSIS — M76.60 INSERTIONAL ACHILLES TENDINOPATHY: Primary | ICD-10-CM

## 2023-05-17 DIAGNOSIS — M25.571 ACUTE RIGHT ANKLE PAIN: ICD-10-CM

## 2023-05-17 PROCEDURE — 97161 PT EVAL LOW COMPLEX 20 MIN: CPT | Performed by: PHYSICAL THERAPIST

## 2023-05-17 PROCEDURE — 97110 THERAPEUTIC EXERCISES: CPT | Performed by: PHYSICAL THERAPIST

## 2023-05-17 PROCEDURE — 97140 MANUAL THERAPY 1/> REGIONS: CPT | Performed by: PHYSICAL THERAPIST

## 2023-05-17 NOTE — PROGRESS NOTES
Physical Therapy Initial Evaluation and Plan of Care  [address], [address] [place] [address]        Patient: Mina Romero   : 1965  Diagnosis/ICD-10 Code:  Insertional Achilles tendinopathy [M76.60]  Referring practitioner: Kemar Stroud DO  Date of Initial Visit: 2023  Today's Date: 2023  Patient seen for 1 sessions           Subjective Questionnaire: LEFS:     R ankle MRI results:  IMPRESSION:                 1. Mild Achilles tendinopathy  2. Retrocalcaneal bursitis  3. Soft tissue edema in the visualized foot and ankle  4. Mild midfoot osteoarthritis  5. Thickening of the medial band of the plantar fascia consistent with a history of plantar Fasciitis    Subjective Evaluation    History of Present Illness  Mechanism of injury: Pt reports that about 8 weeks he was running after his dog and felt a sharp pain in the R Achilles.  Pt reports that he was placed in a CAM boot for about a month after this incident.  Pt reports that he continues to have pain, stiffness, weakness and swelling in the R ankle.  Pt reports that he has the most difficulty with going up and down the stairs and walking for long distances.  Pt reports that pain is burning and aching and is localized in Achilles tendon.  Pt reports that he is taking motrin for pain.  Pt reports that he still has R ankle inflammation but has not been using ice.  Pt has a desk job.       Pain  Current pain ratin  At best pain ratin  At worst pain ratin  Quality: burning and tight  Relieving factors: change in position  Aggravating factors: ambulation, squatting, stairs, prolonged positioning, repetitive movement, standing and movement  Progression: no change    Social Support  Lives with: spouse    Patient Goals  Patient goals for therapy: decreased edema, decreased pain, improved balance, increased motion, increased strength, independence with ADLs/IADLs and return to sport/leisure activities             Objective           Static Posture   General Observations  Shifted left.     Hip   Hip (Left): Externally rotated.   Hip (Right): Externally rotated.     Ankle/Foot   Ankle/Foot (Left): Pronated.   Ankle/Foot (Right): Pronated.     Tenderness     Right Ankle/Foot   Tenderness in the Achilles insertion, lateral malleolus, medial calcaneus and proximal Achilles. No tenderness in the plantar fascia.     Active Range of Motion     Right Ankle/Foot   Plantar flexion: 22 degrees   Inversion: 15 degrees   Eversion: 12 degrees     Additional Active Range of Motion Details  R ankle DF: 10 degrees lacking 0    Passive Range of Motion     Right Ankle/Foot    Plantar flexion: 28 degrees   Inversion: 20 degrees   Eversion: 15 degrees     Additional Passive Range of Motion Details  R ankle DF: 2 degrees lacking 0    Strength/Myotome Testing     Left Hip   Planes of Motion   Flexion: 5    Right Hip   Planes of Motion   Flexion: 5    Left Knee   Flexion: 5  Extension: 5    Right Knee   Flexion: 5  Extension: 5    Left Ankle/Foot   Dorsiflexion: 5  Plantar flexion: 4-  Inversion: 5  Eversion: 5    Right Ankle/Foot   Dorsiflexion: 4-  Plantar flexion: 3-  Inversion: 4-  Eversion: 4-    Tests     Right Ankle/Foot   Negative for anterior drawer, calcaneal squeeze, eversion talar tilt, inversion talar tilt, posterior drawer, Arreola, valgus tilt and varus tilt.     Ambulation     Observational Gait   Gait: antalgic   Decreased walking speed and right stance time.     Additional Observational Gait Details  Pt demonstrates decreased stance time, heel strike and push off on RLE.      General Comments     Ankle/Foot Comments   Pt demonstrates 2+ pitting edema in bilateral ankles  Significant gastroc, soleus and hamstring tightness           Assessment & Plan     Assessment  Impairments: abnormal coordination, abnormal gait, abnormal or restricted ROM, activity intolerance, impaired balance, impaired physical strength, lacks appropriate home exercise  program and pain with function  Functional Limitations: lifting, sleeping, walking, uncomfortable because of pain, standing and unable to perform repetitive tasks  Assessment details: Patient presents with signs/symptoms consistent with R achilles tendinopathy including decreased R ankle ROM, R ankle weakness, R ankle swelling, gait deficits and reports of pain limiting function during ADLs as shown on the LEFS.  Patient would benefit from skilled PT services in order to address deficits limiting function at this time.  HEP was given to patient this session and education on HEP/diagnosis provided to patient.        Goals  Plan Goals: 1. The patient has limited ROM for the R ankle.   LTG 1: 12 weeks:  In order to allow the patient greater ease with forward, lateral, and diagonal mobility the patient will demonstrate improved ROM of the R ankle as follows:  10 degrees of dorsiflexion, 45 degrees of plantarflexion, 30 degrees of inversion, and 20 degrees of eversion.  STATUS:  New   STG 1a: 6 weeks:  The patient will demonstrate improved ROM of the R ankle as follows:  5 degrees of dorsiflexion, 40 degrees of plantarflexion, 25 degrees of inversion, and 15 degrees of eversion.    STATUS:  New    2. The patient has limited strength of the R ankle.   LTG 2: 12 weeks:  In order to provide greater joint stability of the R ankle the patient will demonstrate improved strength of the R ankle as follows:  4+/5 dorsiflexion, 4+/5 inversion, 4+/5 eversion, and 4/5 plantar flexion.    STATUS:  New   STG 2a: 6 weeks:  The patient will demonstrate improved strength of the R ankle as follows:  4/5 dorsiflexion, 4/5 inversion, 4/5 eversion, and 4-/5 plantar flexion.    STATUS:  New    3. The patient has gait dysfunction.   LTG 3: 12 weeks:  The patient will ambulate without assistive device, independently, for community distances with minimal limp to the R lower extremity in order to improve mobility and allow patient to perform  activities such as grocery shopping with greater ease.    STATUS:  New   STG 3a: The patient will be independent in HEP.    STATUS:  New    4. The patient complains of R ankle pain.  LTG 4: 12 weeks:  The patient will report a pain rating of 2/10 or better in order to improve  tolerance to activities of daily living and improve sleep quality.  STATUS:  New  STG 4a: 6 weeks:  The patient will report a pain rating of 3/10 or better.  STATUS:  New    5. Mobility: Walking/Moving Around Functional Limitation     LTG 5: 12 weeks:  The patient will demonstrate 1-19% limitation by achieving a score of 65 on the Lower Extremity Functional Scale.    STATUS:  New   STG 5a: 6 weeks:  The patient will demonstrate 50% limitation by achieving a score of 40/80 on the Lower Extremity Functional Scale.      STATUS:  New        Plan  Therapy options: will be seen for skilled therapy services  Planned modality interventions: TENS, thermotherapy (hydrocollator packs) and cryotherapy  Planned therapy interventions: manual therapy, ADL retraining, balance/weight-bearing training, neuromuscular re-education, postural training, soft tissue mobilization, flexibility, spinal/joint mobilization, functional ROM exercises, strengthening, gait training, stretching, home exercise program, therapeutic activities and joint mobilization  Frequency: 2x week  Duration in weeks: 12  Treatment plan discussed with: patient        Timed:         Manual Therapy:    10     mins  67739;     Therapeutic Exercise:    13     mins  65875;     Neuromuscular Vlad:    0    mins  30418;    Therapeutic Activity:     0     mins  80678;     Gait Trainin     mins  80515;     Ultrasound:     0     mins  01816;    Ionto                               0    mins   62476  Self-care  __0__ mins 46799    Un-Timed:  Electrical Stimulation:    0     mins  36999 ( );  Traction     0     mins 50078  Low Eval     20     Mins  74817  Mod Eval     0     Mins   97449  High Eval                       0     Mins  79311  Hot pack     0     Mins    Cold pack                       0     Mins      Timed Treatment:   23   mins   Total Treatment:     43   mins    PT SIGNATURE: Bozena Garrett PT      Electronically signed 5/17/2023  [place] License: [id number]    Initial Certification    Certification Period: 5/17/2023 thru 8/14/2023  NPI: [id number]  I certify that the therapy services are furnished while this patient is under my care.  The services outlined above are required by this patient, and will be reviewed every 90 days.     PHYSICIAN: Kemar Stroud, DO      DATE:     Please sign and return via fax to [phone].. Thank you, [facility].

## 2023-05-24 ENCOUNTER — TREATMENT (OUTPATIENT)
Dept: PHYSICAL THERAPY | Facility: CLINIC | Age: 58
End: 2023-05-24
Payer: OTHER GOVERNMENT

## 2023-05-24 DIAGNOSIS — M25.571 ACUTE RIGHT ANKLE PAIN: ICD-10-CM

## 2023-05-24 DIAGNOSIS — R29.898 ANKLE WEAKNESS: ICD-10-CM

## 2023-05-24 DIAGNOSIS — M76.60 INSERTIONAL ACHILLES TENDINOPATHY: Primary | ICD-10-CM

## 2023-05-24 PROCEDURE — 97140 MANUAL THERAPY 1/> REGIONS: CPT | Performed by: PHYSICAL THERAPIST

## 2023-05-24 PROCEDURE — 97110 THERAPEUTIC EXERCISES: CPT | Performed by: PHYSICAL THERAPIST

## 2023-05-24 NOTE — PROGRESS NOTES
Physical Therapy Daily Treatment Note  [address], [address] [place] [address]        Patient: Mina Romero   : 1965  Diagnosis/ICD-10 Code:  Insertional Achilles tendinopathy [M76.60]  Referring practitioner: Kemar Stroud DO  Date of Initial Visit: Type: THERAPY  Noted: 2023  Today's Date: 2023  Patient seen for 2 sessions             Subjective   Mina Romero reports having 3/10 pain in his right ankle- medial/lateral achilles region.    Objective     Observational Gait   Gait: antalgic     Pt demonstrates decreased stance time, heel strike and push off on RLE.      + Tightness noted Right Hamstring, Gastroc,  Soleus, and Plantar surface of foot.    See Exercise and Manual Logs for complete treatment.       Assessment/Plan     Pt tolerated therapy session well - with progression of therapeutic exercises, CKC-Functional activities, and Manual therapy. He has improved, but continues to have deficits in His Right Ankle/Foot ROM and   Strength; limiting functional mobility and ability to perform all ADLs without pain and difficulty at this time.    Progress per Plan of Care           Timed:  Manual Therapy:    15     mins  09975;  Therapeutic Exercise:    15     mins  58302;     Neuromuscular Vlad:    0    mins  99993;    Therapeutic Activity:     0     mins  23075;     Gait Trainin     mins  71724;     Ultrasound:     0     mins  07490;    Electrical Stimulation:    0     mins  39643;  Iontophoresis     0     mins  85914    Untimed:  Electrical Stimulation:    0     mins  58896 ( );  Mechanical Traction:    0     mins  31782;   Fluidotherapy     0     mins  76181  Hot pack     0     mins  04699  Cold pack     0     mins  40543    Timed Treatment:   30   mins   Total Treatment:     30   mins        Nanda Rodríguez PTA  Physical Therapist Assistant

## 2023-05-31 ENCOUNTER — TREATMENT (OUTPATIENT)
Dept: PHYSICAL THERAPY | Facility: CLINIC | Age: 58
End: 2023-05-31

## 2023-05-31 DIAGNOSIS — R29.898 ANKLE WEAKNESS: ICD-10-CM

## 2023-05-31 DIAGNOSIS — M76.60 INSERTIONAL ACHILLES TENDINOPATHY: Primary | ICD-10-CM

## 2023-05-31 DIAGNOSIS — M25.571 ACUTE RIGHT ANKLE PAIN: ICD-10-CM

## 2023-05-31 NOTE — PROGRESS NOTES
Physical Therapy Daily Treatment Note  [address], [address] [place] [address]        Patient: Mina Romero   : 1965  Diagnosis/ICD-10 Code:  Insertional Achilles tendinopathy [M76.60]  Referring practitioner: Kemar Stroud DO  Date of Initial Visit: Type: THERAPY  Noted: 2023  Today's Date: 2023  Patient seen for 3 sessions             Subjective   Mina Romero reports having 4/10 pain in his right ankle / foot area.  He does report having temporary relief after last therapy session and has tried to reproduce exercises and stretches at home.      Objective     + Tightness noted Right Hamstring, Gastroc,  Soleus, Achilles, and Plantar surface of foot.        See Exercise and Manual Logs for complete treatment.       Assessment/Plan     Pt tolerated therapy session well - with progression of therapeutic exercises, CKC-Functional activities, and Manual therapy. He has improved, but continues to have deficits in His Right Ankle/Foot ROM and   Strength; limiting functional mobility and ability to perform all ADLs without pain and difficulty at this time.  Positive response to trials of Manual therapy and deep tissue mobilization; as evidenced by pt reporting temporary reduction in pain and tightness after last therapy session.      HEP progressed this date with 4 way ankle Theraband strengthening.  Red band - (Latex free )   Issued this date along with written/verbal instruction given.      Progress per Plan of Care               Timed:  Manual Therapy:    15     mins  49783;  Therapeutic Exercise:    15     mins  39595;     Neuromuscular Vlad:    0    mins  26673;    Therapeutic Activity:     8     mins  79521;     Gait Trainin     mins  97383;     Ultrasound:     0     mins  92330;    Electrical Stimulation:    0     mins  74964;  Iontophoresis     0     mins  40087    Untimed:  Electrical Stimulation:    0     mins  48863 ( );  Mechanical Traction:    0     mins  82983;    Fluidotherapy     0     mins  22886  Hot pack     0     mins  87180  Cold pack     0     mins  08226    Timed Treatment:   38   mins   Total Treatment:     38   mins        Nanda Rodríguez PTA  Physical Therapist Assistant

## 2023-06-07 ENCOUNTER — TREATMENT (OUTPATIENT)
Dept: PHYSICAL THERAPY | Facility: CLINIC | Age: 58
End: 2023-06-07
Payer: OTHER GOVERNMENT

## 2023-06-07 DIAGNOSIS — M25.571 ACUTE RIGHT ANKLE PAIN: ICD-10-CM

## 2023-06-07 DIAGNOSIS — R29.898 ANKLE WEAKNESS: ICD-10-CM

## 2023-06-07 DIAGNOSIS — M76.60 INSERTIONAL ACHILLES TENDINOPATHY: Primary | ICD-10-CM

## 2023-06-07 NOTE — PROGRESS NOTES
"Physical Therapy Daily Treatment Note  [address], [address] [place] [address]        Patient: Mina Romero   : 1965  Diagnosis/ICD-10 Code:  Insertional Achilles tendinopathy [M76.60]  Referring practitioner: Kemar Stroud DO  Date of Initial Visit: Type: THERAPY  Noted: 2023  Today's Date: 2023  Patient seen for 4 sessions             Subjective   Mina Romero reports: having 3/10 pain in his right foot / heel upon arrival today.  He reports that he has been less \"Stiff\" with normal walking, but continued difficulty and pain with stair performance- descending greater than ascending.    Objective     Active Range of Motion      Right Ankle/Foot   Plantar flexion: 30 degrees   Inversion:  23 degrees   Eversion: 20 degrees     Additional Active Range of Motion Details  R ankle DF: 8 degrees lacking 0    PROM:  Neutral DF    See Exercise Logs for complete treatment.       Assessment/Plan     Pt tolerated therapy session well - with progression of therapeutic exercises, CKC-Functional activities, and Manual therapy. He has improved, but continues to have deficits in His Right Ankle/Foot ROM and   Strength; limiting functional mobility and ability to perform all ADLs without pain and difficulty at this time.  Positive response to trials of Manual therapy and deep tissue mobilization; as evidenced by pt reporting temporary reduction in pain and tightness after last therapy session.   Pt most limited by pain with functional step downs with right lower extremity- decreased eccentric ROM and control noted during trials in clinic.  Increase in right ankle AROM/PROM as compared to initial evaluation        Progress per Plan of Care           Timed:  Manual Therapy:    15     mins  87843;  Therapeutic Exercise:    28     mins  14483;     Neuromuscular Vlad:    0    mins  53008;    Therapeutic Activity:     10     mins  49283;     Gait Trainin     mins  70224;     Ultrasound:     0     mins  " 51635;    Electrical Stimulation:    0     mins  05518;  Iontophoresis     0     mins  33660    Untimed:  Electrical Stimulation:    0     mins  17532 ( );  Mechanical Traction:    0     mins  52984;   Fluidotherapy     0     mins  34989  Hot pack     0     mins  99739  Cold pack     0     mins  02190    Timed Treatment:   53   mins   Total Treatment:     53   mins        Nanda Rodríguez PTA  Physical Therapist Assistant

## 2023-06-14 ENCOUNTER — TELEPHONE (OUTPATIENT)
Dept: PHYSICAL THERAPY | Facility: OTHER | Age: 58
End: 2023-06-14
Payer: OTHER GOVERNMENT

## 2023-06-14 NOTE — TELEPHONE ENCOUNTER
Caller: Mina Romero    Relationship: Self    What was the call regarding: PATIENT CANCELLED APPT TODAY BECAUSE THEY CANT MAKE IT

## 2023-07-27 ENCOUNTER — TREATMENT (OUTPATIENT)
Dept: PHYSICAL THERAPY | Facility: CLINIC | Age: 58
End: 2023-07-27
Payer: OTHER GOVERNMENT

## 2023-07-27 DIAGNOSIS — M25.571 ACUTE RIGHT ANKLE PAIN: ICD-10-CM

## 2023-07-27 DIAGNOSIS — M76.60 INSERTIONAL ACHILLES TENDINOPATHY: Primary | ICD-10-CM

## 2023-07-27 NOTE — PROGRESS NOTES
Progress note  [address], [address] [place] [address]      Patient: Mina Romero   : 1965  Diagnosis/ICD-10 Code:  Insertional Achilles tendinopathy [M76.60]  Referring practitioner: Kemar Stroud DO  Date of Initial Visit: Type: THERAPY  Noted: 2023  Today's Date: 2023  Patient seen for 9 sessions        Subjective:   Mina Romero reports: 4/10 R Achilles pain today.  Pt reports that he stretched more yesterday and has noticed more soreness today.  Pt reports that in general pain/function have improved over the past month.  Pt reports that he is still unable to go up/down the stairs normally and has to ascend one at a time and descend laterally due to increased pain.  Subjective Questionnaire: LEFS:       Subjective   Objective   Active Range of Motion      Right Ankle/Foot   Plantar flexion: 38 degrees   Inversion: 20 degrees   Eversion: 10 degrees     Additional Active Range of Motion Details  R ankle DF: 0      Passive Range of Motion      Right Ankle/Foot    Plantar flexion: WFL  Inversion: 24 degrees   Eversion: 15 degrees     Additional Passive Range of Motion Details  R ankle DF: 3 degrees     Strength/Myotome Testing      Left Hip   Planes of Motion   Flexion: 5     Right Hip   Planes of Motion   Flexion: 5     Left Knee   Flexion: 5  Extension: 5     Right Knee   Flexion: 5  Extension: 5     Left Ankle/Foot   Dorsiflexion: 5  Plantar flexion: 4-  Inversion: 5  Eversion: 5     Right Ankle/Foot   Dorsiflexion: 5  Plantar flexion: 3+  Inversion: 5  Eversion: 5  Assessment & Plan     Assessment    Assessment details: Patient demonstrates improvements in R ankle strength compared to last progress note.  Pt however demonstrates no significant progress in R ankle ROM, R plantarflexion strength or reports of pain/function during ADLs.  Pt limited in gait and stairs due to lack of ankle dorsiflexion AROM and plantarflexion weakness.  Pt continues to complain of pain localized near R  lateral malleolus and Achilles tendon.  R peroneal musculature tightness/tenderness noted.  Due to pt making limited progress towards set therapy goals the pt will be discharged at this time.  Pt advised to return to [place] and that he may benefit from seeing a ankle/foot specialist at this time for other interventions.  Pt reports understanding and is comfortable with this plan.             Goals  Plan Goals:  1. The patient has limited ROM for the R ankle.              LTG 1: 12 weeks:  In order to allow the patient greater ease with forward, lateral, and diagonal mobility the patient will demonstrate improved ROM of the R ankle as follows:  10 degrees of dorsiflexion, 45 degrees of plantarflexion, 30 degrees of inversion, and 20 degrees of eversion.  STATUS:  not met              STG 1a: 6 weeks:  The patient will demonstrate improved ROM of the R ankle as follows:  5 degrees of dorsiflexion, 40 degrees of plantarflexion, 25 degrees of inversion, and 15 degrees of eversion.                          STATUS:  not met     2. The patient has limited strength of the R ankle.              LTG 2: 12 weeks:  In order to provide greater joint stability of the R ankle the patient will demonstrate improved strength of the R ankle as follows:  4+/5 dorsiflexion, 4+/5 inversion, 4+/5 eversion, and 4/5 plantar flexion.                          STATUS:  partially met              STG 2a: 6 weeks:  The patient will demonstrate improved strength of the R ankle as follows:  4/5 dorsiflexion, 4/5 inversion, 4/5 eversion, and 4-/5 plantar flexion.                          STATUS:  partially met     3. The patient has gait dysfunction.              LTG 3: 12 weeks:  The patient will ambulate without assistive device, independently, for community distances with minimal limp to the R lower extremity in order to improve mobility and allow patient to perform activities such as grocery shopping with greater ease.                           STATUS:  not met              STG 3a: The patient will be independent in [facility].                          STATUS:  met     4. The patient complains of R ankle pain.  LTG 4: 12 weeks:  The patient will report a pain rating of 2/10 or better in order to improve  tolerance to activities of daily living and improve sleep quality.  STATUS:  not met  STG 4a: 6 weeks:  The patient will report a pain rating of 3/10 or better.  STATUS:  not met     5. Mobility: Walking/Moving Around Functional Limitation                               LTG 5: 12 weeks:  The patient will demonstrate 1-19% limitation by achieving a score of 65 on the Lower Extremity Functional Scale.                          STATUS:  not met              STG 5a: 6 weeks:  The patient will demonstrate 50% limitation by achieving a score of 40/80 on the Lower Extremity Functional Scale.                            STATUS:  not met      Plan  Therapy options: will not be seen for skilled therapy services    Progress toward previous goals: Partially Met      Recommendations: Discharge    PT SIGNATURE: Bozena Garrett, PT     Electronically signed 2023  DATE TREATMENT INITIATED: 2023  [place] License: [id number]      Based upon review of the patient's progress and continued therapy plan, it is my medical opinion that Mina Romero should continue physical therapy treatment at [facility] PH[facility]   [address] [id number] [phone].      Timed:  Manual Therapy:    8     mins  14143;  Therapeutic Exercise:    22     mins  31967;     Neuromuscular Vlad:    0    mins  45304;    Therapeutic Activity:     8     mins  30201;     Gait Trainin     mins  18161;     Ultrasound:     0     mins  84708;    Electrical Stimulation:    0     mins  87194 ( );    Untimed:  Electrical Stimulation:    0     mins  04254 ( );  Mechanical Traction:    0     mins  80695;     Timed Treatment:   38   mins   Total  Treatment:     43   mins

## 2023-07-28 ENCOUNTER — TELEPHONE (OUTPATIENT)
Dept: FAMILY MEDICINE CLINIC | Facility: CLINIC | Age: 58
End: 2023-07-28
Payer: OTHER GOVERNMENT

## 2023-07-28 DIAGNOSIS — M79.671 PAIN OF RIGHT HEEL: ICD-10-CM

## 2023-07-28 DIAGNOSIS — M76.60 INSERTIONAL ACHILLES TENDINOPATHY: Primary | ICD-10-CM

## 2023-07-28 NOTE — TELEPHONE ENCOUNTER
Received phone call from patient stating that he has been seeing physical therapy for her right heel and Achilles tendon and has had some progress but not sufficient. Patient stated that physical therapy recommended he be referred to podiatry for this issue for further evaluation.

## 2023-09-06 ENCOUNTER — OFFICE VISIT (OUTPATIENT)
Dept: PODIATRY | Facility: CLINIC | Age: 58
End: 2023-09-06
Payer: OTHER GOVERNMENT

## 2023-09-06 VITALS
BODY MASS INDEX: 42.66 KG/M2 | TEMPERATURE: 98.2 F | SYSTOLIC BLOOD PRESSURE: 118 MMHG | OXYGEN SATURATION: 92 % | WEIGHT: 315 LBS | HEIGHT: 72 IN | HEART RATE: 63 BPM | DIASTOLIC BLOOD PRESSURE: 76 MMHG

## 2023-09-06 DIAGNOSIS — M76.61 ACHILLES TENDINITIS OF RIGHT LOWER EXTREMITY: ICD-10-CM

## 2023-09-06 DIAGNOSIS — M77.51 POSTCALCANEAL BURSITIS OF RIGHT FOOT: Primary | ICD-10-CM

## 2023-09-06 RX ORDER — METHYLPREDNISOLONE 4 MG/1
TABLET ORAL
Qty: 21 TABLET | Refills: 0 | Status: SHIPPED | OUTPATIENT
Start: 2023-09-06

## 2023-09-06 RX ORDER — MELOXICAM 15 MG/1
15 TABLET ORAL DAILY
Qty: 30 TABLET | Refills: 0 | Status: SHIPPED | OUTPATIENT
Start: 2023-09-06

## 2023-09-07 NOTE — PROGRESS NOTES
[facility] - PODIATRY    Today's Date: 09/07/23    Patient Name: Mina Romero  MRN: [id number]  CSN: [id number]  PCP: Kemar Stroud DO,  Referring Provider: Kemar Stroud DO    SUBJECTIVE     Chief Complaint   Patient presents with    Right Ankle - Pain     Onset March / April chasing a dog Saw Dr Avery exam placed in a boot ordered PT MRI done April continued to have some discomfort and referred here     HPI: Mina Romero, a 58 y.o.male, presents to clinic.    Patient is a 58-year-old male that states during March and April he was chasing his dog and he started having some discomfort in his right heel.  Patient went into a boot and then subsequently did physical therapy.  He states that it did improve but he still having trouble going up and down steps putting full weight on that foot.  Is  where the Achilles tendon inserts.    Past Medical History:   Diagnosis Date    ADHD     Allergic rhinitis 03/05/2019    Allergies     Anxiety     Arthritis     Asthma     COVID-19 01/20/2021    Emotional depression     Head injury     X3 IN [place]    High blood pressure     High cholesterol     Sinus trouble     Tic      Past Surgical History:   Procedure Laterality Date    COLONOSCOPY      COLONOSCOPY N/A 10/14/2021    Procedure: COLONOSCOPY;  Surgeon: Riley Rydre MD;  Location: formerly [facility] ENDOSCOPY;  Service: Gastroenterology;  Laterality: N/A;  DIVERTICULOSIS, HEMORRHOIDS    INGUINAL HERNIA REPAIR Bilateral     SHOULDER SURGERY  1995    TONSILLECTOMY  1976     Family History   Problem Relation Age of Onset    Diabetes Mother     Stroke Father     Heart disease Father     Diabetes Father     Anxiety disorder Father     Depression Father     Heart disease Other         AUNT/UNCLE    Anxiety disorder Sister      Social History     Socioeconomic History    Marital status:    Tobacco Use    Smoking status: Former     Packs/day: 1.00     Years: 2.00     Pack years: 2.00      Types: Cigarettes     Quit date: 2008     Years since quitting: 15.6    Smokeless tobacco: Never    Tobacco comments:     APPROX 20 YEARS AGO    Vaping Use    Vaping Use: Never used   Substance and Sexual Activity    Alcohol use: Never    Drug use: Never     Allergies   Allergen Reactions    Latex Rash     Current Outpatient Medications   Medication Sig Dispense Refill    aspirin 81 MG EC tablet Take 1 tablet by mouth Daily. 90 tablet 1    cetirizine (zyrTEC) 10 MG tablet 1 tablet.      DULoxetine (CYMBALTA) 60 MG capsule 1 capsule Daily.      fluticasone (FLONASE) 50 MCG/ACT nasal spray 2 sprays into the nostril(s) as directed by provider Every Night.      mirtazapine (REMERON) 30 MG tablet Take  by mouth. 0.5 tab qd      montelukast (SINGULAIR) 10 MG tablet Take 1 tablet by mouth At Night As Needed.      prazosin (MINIPRESS) 2 MG capsule Take 1 capsule by mouth Every Night.      rosuvastatin (CRESTOR) 10 MG tablet Take 1 tablet by mouth Every Night.      sildenafil (VIAGRA) 100 MG tablet       urea (CARMOL) 20 % cream Apply as needed Twice daily 85 g 3    meloxicam (MOBIC) 15 MG tablet Take 1 tablet by mouth Daily. Take once daily. 30 tablet 0    methylPREDNISolone (MEDROL) 4 MG dose pack Take as directed 21 tablet 0     No current facility-administered medications for this visit.     Review of Systems   Musculoskeletal:         Right heel pain   All other systems reviewed and are negative.    OBJECTIVE     Vitals:    09/06/23 1431   BP: 118/76   Pulse: 63   Temp: 98.2 °F (36.8 °C)   SpO2: 92%       WBC   Date Value Ref Range Status   08/31/2022 6.59 3.40 - 10.80 10*3/mm3 Final     RBC   Date Value Ref Range Status   08/31/2022 5.17 4.14 - 5.80 10*6/mm3 Final     Hemoglobin   Date Value Ref Range Status   08/31/2022 15.3 13.0 - 17.7 g/dL Final     Hematocrit   Date Value Ref Range Status   08/31/2022 44.8 37.5 - 51.0 % Final     MCV   Date Value Ref Range Status   08/31/2022 86.7 79.0 - 97.0 fL Final     MCH    Date Value Ref Range Status   08/31/2022 29.6 26.6 - 33.0 pg Final     MCHC   Date Value Ref Range Status   08/31/2022 34.2 31.5 - 35.7 g/dL Final     RDW   Date Value Ref Range Status   08/31/2022 12.7 12.3 - 15.4 % Final     RDW-SD   Date Value Ref Range Status   08/31/2022 39.9 37.0 - 54.0 fl Final     MPV   Date Value Ref Range Status   08/31/2022 9.6 6.0 - 12.0 fL Final     Platelets   Date Value Ref Range Status   08/31/2022 227 140 - 450 10*3/mm3 Final     Neutrophil %   Date Value Ref Range Status   08/31/2022 63.5 42.7 - 76.0 % Final     Lymphocyte %   Date Value Ref Range Status   08/31/2022 25.9 19.6 - 45.3 % Final     Monocyte %   Date Value Ref Range Status   08/31/2022 8.6 5.0 - 12.0 % Final     Eosinophil %   Date Value Ref Range Status   08/31/2022 1.2 0.3 - 6.2 % Final     Basophil %   Date Value Ref Range Status   08/31/2022 0.5 0.0 - 1.5 % Final     Immature Grans %   Date Value Ref Range Status   08/31/2022 0.3 0.0 - 0.5 % Final     Neutrophils, Absolute   Date Value Ref Range Status   08/31/2022 4.18 1.70 - 7.00 10*3/mm3 Final     Lymphocytes, Absolute   Date Value Ref Range Status   08/31/2022 1.71 0.70 - 3.10 10*3/mm3 Final     Monocytes, Absolute   Date Value Ref Range Status   08/31/2022 0.57 0.10 - 0.90 10*3/mm3 Final     Eosinophils, Absolute   Date Value Ref Range Status   08/31/2022 0.08 0.00 - 0.40 10*3/mm3 Final     Basophils, Absolute   Date Value Ref Range Status   08/31/2022 0.03 0.00 - 0.20 10*3/mm3 Final     Immature Grans, Absolute   Date Value Ref Range Status   08/31/2022 0.02 0.00 - 0.05 10*3/mm3 Final     nRBC   Date Value Ref Range Status   08/31/2022 0.0 0.0 - 0.2 /100 WBC Final         Lab Results   Component Value Date    GLUCOSE 73 08/31/2022    BUN 17 08/31/2022    CREATININE 1.21 08/31/2022    BCR 14.0 08/31/2022    K 4.2 08/31/2022    CO2 25.2 08/31/2022    CALCIUM 9.3 08/31/2022    ALBUMIN 4.10 08/31/2022    LABIL2 1.7 12/18/2019    AST 30 08/31/2022    ALT 31  08/31/2022       Patient seen in no apparent distress.      PHYSICAL EXAM:     Foot/Ankle Exam    GENERAL  Appearance:  appears stated age  Orientation:  AAOx3  Affect:  appropriate  Gait:  unimpaired  Assistance:  independent  Right shoe gear: casual shoe  Left shoe gear: casual shoe    VASCULAR     Right Foot Vascularity   Normal vascular exam    Dorsalis pedis:  2+  Posterior tibial:  2+  Skin temperature:  warm  Edema grading:  None  CFT:  < 3 seconds  Pedal hair growth:  Present  Varicosities:  none     Left Foot Vascularity   Normal vascular exam    Dorsalis pedis:  2+  Posterior tibial:  2+  Skin temperature:  warm  Edema grading:  None  CFT:  < 3 seconds  Pedal hair growth:  Present  Varicosities:  none     NEUROLOGIC     Right Foot Neurologic   Normal sensation    Light touch sensation: normal  Vibratory sensation: normal  Hot/Cold sensation: normal  Protective Sensation using [employer] Monofilament:   Sites intact: 10  Sites tested: 10     Left Foot Neurologic   Normal sensation    Light touch sensation: normal  Vibratory sensation: normal  Hot/Cold sensation:  normal  Protective Sensation using [employer] Monofilament:   Sites intact: 10  Sites tested: 10    MUSCLE STRENGTH     Right Foot Muscle Strength   Foot dorsiflexion:  4  Foot plantar flexion:  4  Foot inversion:  4  Foot eversion:  4     Left Foot Muscle Strength   Foot dorsiflexion:  4  Foot plantar flexion:  4  Foot inversion:  4  Foot eversion:  4    RANGE OF MOTION     Right Foot Range of Motion   Foot and ankle ROM within normal limits       Left Foot Range of Motion   Foot and ankle ROM within normal limits      DERMATOLOGIC      Right Foot Dermatologic   Skin  Right foot skin is intact.      Left Foot Dermatologic   Skin  Left foot skin is intact.      Right foot additional comments: Pain to insertion of Achilles tendon.  Mild pain on range of motion of ankle.    RADIOLOGY:          No results found.    ASSESSMENT/PLAN      Diagnoses and all orders for this visit:    1. Postcalcaneal bursitis of right foot (Primary)    2. Achilles tendinitis of right lower extremity    Other orders  -     methylPREDNISolone (MEDROL) 4 MG dose pack; Take as directed  Dispense: 21 tablet; Refill: 0  -     meloxicam (MOBIC) 15 MG tablet; Take 1 tablet by mouth Daily. Take once daily.  Dispense: 30 tablet; Refill: 0      Patient has failed conservative treatment including cam boot and physical therapy.  Discussed oral steroids immobilization and surgical options with the patient.    Oral steroids and anti-inflammatory medication prescribed.  Continue stretching and icing.  Patient to return to clinic in 1 month.  .    Comprehensive lower extremity examination and evaluation was performed.    Discussed findings and treatment plan including risks, benefits, and treatment options with patient in detail. Patient agreed with treatment plan.    Medications and allergies reviewed.  Reviewed available lab values along with other pertinent labs.  These were discussed with the patient.    An After Visit Summary was printed and given to the patient at discharge, including (if requested) any available informative/educational handouts regarding diagnosis, treatment, or medications. All questions were answered to patient/family satisfaction. Should symptoms fail to improve or worsen they agree to call or return to clinic or to go to the Emergency Department. Discussed the importance of following up with any needed screening tests/labs/specialist appointments and any requested follow-up recommended by me today. Importance of maintaining follow-up discussed and patient accepts that missed appointments can delay diagnosis and potentially lead to worsening of conditions.    Return in about 1 month (around 10/6/2023)., or sooner if acute issues arise.    This document has been electronically signed by Grant Aiken DPM on September 7, 2023 07:25 EDT

## 2023-09-12 ENCOUNTER — TELEPHONE (OUTPATIENT)
Dept: PODIATRY | Facility: CLINIC | Age: 58
End: 2023-09-12
Payer: OTHER GOVERNMENT

## 2023-10-03 ENCOUNTER — OFFICE VISIT (OUTPATIENT)
Dept: FAMILY MEDICINE CLINIC | Facility: CLINIC | Age: 58
End: 2023-10-03
Payer: OTHER GOVERNMENT

## 2023-10-03 VITALS
HEIGHT: 72 IN | HEART RATE: 86 BPM | DIASTOLIC BLOOD PRESSURE: 84 MMHG | OXYGEN SATURATION: 96 % | WEIGHT: 315 LBS | BODY MASS INDEX: 42.66 KG/M2 | TEMPERATURE: 98.4 F | SYSTOLIC BLOOD PRESSURE: 130 MMHG

## 2023-10-03 DIAGNOSIS — L91.8 MULTIPLE ACQUIRED SKIN TAGS: Primary | ICD-10-CM

## 2023-10-03 DIAGNOSIS — Z23 NEED FOR INFLUENZA VACCINATION: ICD-10-CM

## 2023-10-03 DIAGNOSIS — M77.51 POSTCALCANEAL BURSITIS OF RIGHT FOOT: ICD-10-CM

## 2023-10-03 DIAGNOSIS — M76.61 TENDONITIS, ACHILLES, RIGHT: ICD-10-CM

## 2023-10-03 NOTE — PROGRESS NOTES
"Chief Complaint  Skin tags  Right Achilles tendinitis    Subjective        Mina Romero presents to [facility]  History of Present Illness  Patient presents today to discuss issues with multiple skin tags in the right and left axillary region.  They will get hung on clothing and will sometimes bleed.  He is interested in having them removed.  He has 4 on the right axillary region and 3 on the left axillary region that we discussed treating today.  He is improving with his Achilles tendinitis.  He is using a heel lift and being followed by Dr. Aiken.  He does have more issues when he is walking without shoes on though or he does not use a heel lift.  He will have follow-up again later this month.  He is due for flu vaccination.  Objective   Vital Signs:  /84   Pulse 86   Temp 98.4 °F (36.9 °C)   Ht 182.9 cm (72\")   Wt (!) 149 kg (328 lb 11.2 oz)   SpO2 96%   BMI 44.58 kg/m²   Estimated body mass index is 44.58 kg/m² as calculated from the following:    Height as of this encounter: 182.9 cm (72\").    Weight as of this encounter: 149 kg (328 lb 11.2 oz).               Physical Exam   General: AAO ×3, no acute distress, pleasant  HEENT: Normocephalic, atraumatic  Skin: Numerous pedunculated skin tags noted in the right axillary region as well as left.  The ones that were causing most issues for him today were marked for removal.  He has 4 on the right and 3 on the left.  Result Review :            Skin Tag Removal    Date/Time: 10/3/2023 2:00 PM  Performed by: Kemar Stroud DO  Authorized by: Kemar Stroud DO   Preparation: Patient was prepped and draped in the usual sterile fashion.  Local anesthesia used: yes  Anesthesia: local infiltration    Anesthesia:  Local anesthesia used: yes  Local anesthetic: Lidocaine hcl with epinephrine  0.5%  Anesthetic total: 1.5 mL  Patient tolerance: patient tolerated the procedure well with no immediate " complications  Comments: Patient provided verbal and written consent for treatment.  He had 4 skin tags anesthetized and removed with a dermblad on the right axillary.  He had 3 skin tags anesthetized and removed with a dermblade in the left axillary region.  There was very minimal bleeding which was controlled using a Hyfrecator.          Assessment and Plan   Diagnoses and all orders for this visit:    1. Multiple acquired skin tags (Primary)  -     Skin Tag Removal    2. Tendonitis, Achilles, right    3. Postcalcaneal bursitis of right foot    4. Need for influenza vaccination  -     Fluzone >6 Months (1030-8247)    Postprocedural instructions were given to the patient.  Flu vaccination provided today.  I plan to see him back for follow-up at his next regular scheduled appointment or sooner if needed.  Patient instructed to call with any questions or concerns.         Follow Up   Return if symptoms worsen or fail to improve.  Patient was given instructions and counseling regarding his condition or for health maintenance advice. Please see specific information pulled into the AVS if appropriate.

## 2023-10-23 ENCOUNTER — OFFICE VISIT (OUTPATIENT)
Dept: PODIATRY | Facility: CLINIC | Age: 58
End: 2023-10-23
Payer: OTHER GOVERNMENT

## 2023-10-23 VITALS
SYSTOLIC BLOOD PRESSURE: 129 MMHG | HEART RATE: 70 BPM | BODY MASS INDEX: 42.66 KG/M2 | DIASTOLIC BLOOD PRESSURE: 74 MMHG | HEIGHT: 72 IN | OXYGEN SATURATION: 93 % | TEMPERATURE: 97.5 F | WEIGHT: 315 LBS

## 2023-10-23 DIAGNOSIS — M62.461 GASTROCNEMIUS EQUINUS OF RIGHT LOWER EXTREMITY: Primary | ICD-10-CM

## 2023-10-23 DIAGNOSIS — M77.51 POSTCALCANEAL BURSITIS OF RIGHT FOOT: ICD-10-CM

## 2023-10-23 DIAGNOSIS — M76.61 ACHILLES TENDINITIS OF RIGHT LOWER EXTREMITY: ICD-10-CM

## 2023-10-23 PROCEDURE — 99213 OFFICE O/P EST LOW 20 MIN: CPT | Performed by: PODIATRIST

## 2023-10-23 RX ORDER — IBUPROFEN 800 MG/1
TABLET ORAL
COMMUNITY
Start: 2023-10-05

## 2023-10-23 NOTE — PROGRESS NOTES
[facility] - PODIATRY    Today's Date: 10/24/23    Patient Name: Mina Romero  MRN: [id number]  CSN: [id number]  PCP: Kemra Stroud DO,  Referring Provider: No ref. provider found    SUBJECTIVE     Chief Complaint   Patient presents with    Right Foot - Follow-up     Temporary relief with  Meloxicam     HPI: Mina Romero, a 58 y.o.male, presents to clinic.    Patient is a 58-year-old male that states during March and April he was chasing his dog and he started having some discomfort in his right heel.  Patient went into a boot and then subsequently did physical therapy.  He states that it did improve but he still having trouble going up and down steps putting full weight on that foot.  Is  where the Achilles tendon inserts.    10/23/23-patient continues to have pain in his Achilles tendon area.  Patient states he feels that his ankle is tight.    Past Medical History:   Diagnosis Date    ADHD     Allergic rhinitis 03/05/2019    Allergies     Anxiety     Arthritis     Asthma     COVID-19 01/20/2021    Emotional depression     Head injury     X3 IN [place]    High blood pressure     High cholesterol     Sinus trouble     Tic      Past Surgical History:   Procedure Laterality Date    COLONOSCOPY      COLONOSCOPY N/A 10/14/2021    Procedure: COLONOSCOPY;  Surgeon: Riley Ryder MD;  Location: [facility] ENDOSCOPY;  Service: Gastroenterology;  Laterality: N/A;  DIVERTICULOSIS, HEMORRHOIDS    INGUINAL HERNIA REPAIR Bilateral     SHOULDER SURGERY  1995    TONSILLECTOMY  1976     Family History   Problem Relation Age of Onset    Diabetes Mother     Stroke Father     Heart disease Father     Diabetes Father     Anxiety disorder Father     Depression Father     Heart disease Other         AUNT/UNCLE    Anxiety disorder Sister      Social History     Socioeconomic History    Marital status:    Tobacco Use    Smoking status: Former     Packs/day: 1.00     Years: 2.00     Additional  pack years: 0.00     Total pack years: 2.00     Types: Cigarettes     Quit date: 2008     Years since quitting: 15.8    Smokeless tobacco: Never    Tobacco comments:     APPROX 20 YEARS AGO    Vaping Use    Vaping Use: Never used   Substance and Sexual Activity    Alcohol use: Never    Drug use: Never     Allergies   Allergen Reactions    Latex Rash     Current Outpatient Medications   Medication Sig Dispense Refill    aspirin 81 MG EC tablet Take 1 tablet by mouth Daily. 90 tablet 1    cetirizine (zyrTEC) 10 MG tablet 1 tablet.      DULoxetine (CYMBALTA) 60 MG capsule 1 capsule Daily.      fluticasone (FLONASE) 50 MCG/ACT nasal spray 2 sprays into the nostril(s) as directed by provider Every Night.      ibuprofen (ADVIL,MOTRIN) 800 MG tablet       mirtazapine (REMERON) 30 MG tablet Take  by mouth. 0.5 tab qd      montelukast (SINGULAIR) 10 MG tablet Take 1 tablet by mouth At Night As Needed.      prazosin (MINIPRESS) 2 MG capsule Take 1 capsule by mouth Every Night.      rosuvastatin (CRESTOR) 10 MG tablet Take 1 tablet by mouth Every Night.      sildenafil (VIAGRA) 100 MG tablet       urea (CARMOL) 20 % cream Apply as needed Twice daily 85 g 3     No current facility-administered medications for this visit.     Review of Systems   Musculoskeletal:         Right heel pain   All other systems reviewed and are negative.      OBJECTIVE     Vitals:    10/23/23 1450   BP: 129/74   Pulse: 70   Temp: 97.5 °F (36.4 °C)   SpO2: 93%       WBC   Date Value Ref Range Status   08/31/2022 6.59 3.40 - 10.80 10*3/mm3 Final     RBC   Date Value Ref Range Status   08/31/2022 5.17 4.14 - 5.80 10*6/mm3 Final     Hemoglobin   Date Value Ref Range Status   08/31/2022 15.3 13.0 - 17.7 g/dL Final     Hematocrit   Date Value Ref Range Status   08/31/2022 44.8 37.5 - 51.0 % Final     MCV   Date Value Ref Range Status   08/31/2022 86.7 79.0 - 97.0 fL Final     MCH   Date Value Ref Range Status   08/31/2022 29.6 26.6 - 33.0 pg Final     MCHC    Date Value Ref Range Status   08/31/2022 34.2 31.5 - 35.7 g/dL Final     RDW   Date Value Ref Range Status   08/31/2022 12.7 12.3 - 15.4 % Final     RDW-SD   Date Value Ref Range Status   08/31/2022 39.9 37.0 - 54.0 fl Final     MPV   Date Value Ref Range Status   08/31/2022 9.6 6.0 - 12.0 fL Final     Platelets   Date Value Ref Range Status   08/31/2022 227 140 - 450 10*3/mm3 Final     Neutrophil %   Date Value Ref Range Status   08/31/2022 63.5 42.7 - 76.0 % Final     Lymphocyte %   Date Value Ref Range Status   08/31/2022 25.9 19.6 - 45.3 % Final     Monocyte %   Date Value Ref Range Status   08/31/2022 8.6 5.0 - 12.0 % Final     Eosinophil %   Date Value Ref Range Status   08/31/2022 1.2 0.3 - 6.2 % Final     Basophil %   Date Value Ref Range Status   08/31/2022 0.5 0.0 - 1.5 % Final     Immature Grans %   Date Value Ref Range Status   08/31/2022 0.3 0.0 - 0.5 % Final     Neutrophils, Absolute   Date Value Ref Range Status   08/31/2022 4.18 1.70 - 7.00 10*3/mm3 Final     Lymphocytes, Absolute   Date Value Ref Range Status   08/31/2022 1.71 0.70 - 3.10 10*3/mm3 Final     Monocytes, Absolute   Date Value Ref Range Status   08/31/2022 0.57 0.10 - 0.90 10*3/mm3 Final     Eosinophils, Absolute   Date Value Ref Range Status   08/31/2022 0.08 0.00 - 0.40 10*3/mm3 Final     Basophils, Absolute   Date Value Ref Range Status   08/31/2022 0.03 0.00 - 0.20 10*3/mm3 Final     Immature Grans, Absolute   Date Value Ref Range Status   08/31/2022 0.02 0.00 - 0.05 10*3/mm3 Final     nRBC   Date Value Ref Range Status   08/31/2022 0.0 0.0 - 0.2 /100 WBC Final         Lab Results   Component Value Date    GLUCOSE 73 08/31/2022    BUN 17 08/31/2022    CREATININE 1.21 08/31/2022    BCR 14.0 08/31/2022    K 4.2 08/31/2022    CO2 25.2 08/31/2022    CALCIUM 9.3 08/31/2022    ALBUMIN 4.10 08/31/2022    LABIL2 1.7 12/18/2019    AST 30 08/31/2022    ALT 31 08/31/2022       Patient seen in no apparent distress.      PHYSICAL EXAM:      Foot/Ankle Exam    GENERAL  Appearance:  appears stated age  Orientation:  AAOx3  Affect:  appropriate  Gait:  unimpaired  Assistance:  independent  Right shoe gear: casual shoe  Left shoe gear: casual shoe    VASCULAR     Right Foot Vascularity   Normal vascular exam    Dorsalis pedis:  2+  Posterior tibial:  2+  Skin temperature:  warm  Edema grading:  None  CFT:  < 3 seconds  Pedal hair growth:  Present  Varicosities:  none     Left Foot Vascularity   Normal vascular exam    Dorsalis pedis:  2+  Posterior tibial:  2+  Skin temperature:  warm  Edema grading:  None  CFT:  < 3 seconds  Pedal hair growth:  Present  Varicosities:  none     NEUROLOGIC     Right Foot Neurologic   Normal sensation    Light touch sensation: normal  Vibratory sensation: normal  Hot/Cold sensation: normal  Protective Sensation using [place] Monofilament:   Sites intact: 10  Sites tested: 10     Left Foot Neurologic   Normal sensation    Light touch sensation: normal  Vibratory sensation: normal  Hot/Cold sensation:  normal  Protective Sensation using [place] Monofilament:   Sites intact: 10  Sites tested: 10    MUSCLE STRENGTH     Right Foot Muscle Strength   Foot dorsiflexion:  4  Foot plantar flexion:  4  Foot inversion:  4  Foot eversion:  4     Left Foot Muscle Strength   Foot dorsiflexion:  4  Foot plantar flexion:  4  Foot inversion:  4  Foot eversion:  4    RANGE OF MOTION     Right Foot Range of Motion   Foot and ankle ROM within normal limits    Ankle dorsiflexion: decreased  (Gastrocnemius equinus to right lower extremity.)     Left Foot Range of Motion   Foot and ankle ROM within normal limits      DERMATOLOGIC      Right Foot Dermatologic   Skin  Right foot skin is intact.      Left Foot Dermatologic   Skin  Left foot skin is intact.      Right foot additional comments: Pain to insertion of Achilles tendon.  Mild pain on range of motion of ankle.      RADIOLOGY:          No results found.    ASSESSMENT/PLAN      Diagnoses and all orders for this visit:    1. Gastrocnemius equinus of right lower extremity (Primary)  -     Case Request; Standing  -     sodium chloride 0.9 % flush 10 mL  -     sodium chloride 0.9 % flush 10 mL  -     sodium chloride 0.9 % infusion 40 mL  -     lactated ringers infusion  -     ceFAZolin (ANCEF) 3,000 mg in sodium chloride 0.9 % 100 mL IVPB  -     Case Request    2. Postcalcaneal bursitis of right foot    3. Achilles tendinitis of right lower extremity    Other orders  -     Follow Anesthesia Guidelines / Protocol; Future  -     Follow Anesthesia Guidelines / Protocol; Standing  -     Verify / Perform Chlorhexidine Skin Prep; Standing  -     Verify / Perform Chlorhexidine Skin Prep if Indicated (If Not Already Completed); Standing  -     Obtain Informed Consent; Future  -     Provide NPO Instructions to Patient; Future  -     Chlorhexidine Skin Prep; Future  -     Insert Peripheral IV; Standing  -     Saline Lock & Maintain IV Access; Standing        Patient has failed approximately 1 year of conservative measures for this issue.     Discussed retrocalcaneal exostectomy versus gastroc recession.  Patient has severe gastroc equinus to the right lower extremity.  We will schedule him for a gastroc recession towards the end of this year.    The risks and benefits of the surgery were discussed with the patient.  This discussion included possible complications of requiring further surgery, possible delayed wound healing, further surgery requiring amputation of the foot or leg, and also included the complication of death.  All the patient's questions were answered to their satisfaction.  Patient states they would like to proceed with the procedure.    Comprehensive lower extremity examination and evaluation was performed.    Discussed findings and treatment plan including risks, benefits, and treatment options with patient in detail. Patient agreed with treatment plan.    Medications and allergies  reviewed.  Reviewed available lab values along with other pertinent labs.  These were discussed with the patient.    An After Visit Summary was printed and given to the patient at discharge, including (if requested) any available informative/educational handouts regarding diagnosis, treatment, or medications. All questions were answered to patient/family satisfaction. Should symptoms fail to improve or worsen they agree to call or return to clinic or to go to the Emergency Department. Discussed the importance of following up with any needed screening tests/labs/specialist appointments and any requested follow-up recommended by me today. Importance of maintaining follow-up discussed and patient accepts that missed appointments can delay diagnosis and potentially lead to worsening of conditions.    Return in about 1 month (around 11/23/2023) for Post op., or sooner if acute issues arise.    This document has been electronically signed by Grant Aiken DPM on October 24, 2023 09:29 EDT

## 2023-10-24 RX ORDER — SODIUM CHLORIDE 0.9 % (FLUSH) 0.9 %
10 SYRINGE (ML) INJECTION EVERY 12 HOURS SCHEDULED
OUTPATIENT
Start: 2023-10-24

## 2023-10-24 RX ORDER — SODIUM CHLORIDE, SODIUM LACTATE, POTASSIUM CHLORIDE, CALCIUM CHLORIDE 600; 310; 30; 20 MG/100ML; MG/100ML; MG/100ML; MG/100ML
100 INJECTION, SOLUTION INTRAVENOUS CONTINUOUS
OUTPATIENT
Start: 2023-10-24

## 2023-10-24 RX ORDER — SODIUM CHLORIDE 0.9 % (FLUSH) 0.9 %
10 SYRINGE (ML) INJECTION AS NEEDED
OUTPATIENT
Start: 2023-10-24

## 2023-10-24 RX ORDER — SODIUM CHLORIDE 9 MG/ML
40 INJECTION, SOLUTION INTRAVENOUS AS NEEDED
OUTPATIENT
Start: 2023-10-24

## 2023-11-28 ENCOUNTER — OFFICE VISIT (OUTPATIENT)
Dept: FAMILY MEDICINE CLINIC | Facility: CLINIC | Age: 58
End: 2023-11-28
Payer: OTHER GOVERNMENT

## 2023-11-28 VITALS
DIASTOLIC BLOOD PRESSURE: 86 MMHG | HEIGHT: 72 IN | OXYGEN SATURATION: 96 % | WEIGHT: 315 LBS | SYSTOLIC BLOOD PRESSURE: 136 MMHG | HEART RATE: 74 BPM | BODY MASS INDEX: 42.66 KG/M2 | TEMPERATURE: 98.6 F

## 2023-11-28 DIAGNOSIS — L91.8 MULTIPLE ACQUIRED SKIN TAGS: Primary | ICD-10-CM

## 2023-11-28 DIAGNOSIS — M76.61 TENDONITIS, ACHILLES, RIGHT: ICD-10-CM

## 2023-11-28 NOTE — PROGRESS NOTES
"Chief Complaint  Evaluation of skin tags    Subjective        Mina Romero presents to [facility]    Patient presents today to discuss additional skin tags that have been causing issues for him.  He has 3 in the left axillary region and 3 on the right axillary region that has been bothering him.  He reports that sometimes he will get them hung on clothing and they will sometimes bleed.  He had for the right axillary region treated on the last visit and 3 in the left axillary region treated on the last visit on 10/3/2023.  He does have ongoing issues with his Achilles tendon on the right.  He did see podiatry and he reports that the plan will be for surgery in January.    Objective   Vital Signs:  /86   Pulse 74   Temp 98.6 °F (37 °C)   Ht 182.9 cm (72\")   Wt (!) 153 kg (337 lb 14.4 oz)   SpO2 96%   BMI 45.83 kg/m²   Estimated body mass index is 45.83 kg/m² as calculated from the following:    Height as of this encounter: 182.9 cm (72\").    Weight as of this encounter: 153 kg (337 lb 14.4 oz).               Physical Exam  Vitals reviewed.   Constitutional:       Appearance: Normal appearance.   HENT:      Head: Normocephalic and atraumatic.   Skin:     Comments: 3 skin tags noted in the right axillary region and 3 on the left axillary region.  They are pedunculated.   Neurological:      Mental Status: He is alert.        Result Review :            Skin Tag Removal    Date/Time: 11/28/2023 1:35 PM    Performed by: Kemar Stroud DO  Authorized by: Kemar Stroud DO  Preparation: Patient was prepped and draped in the usual sterile fashion.  Local anesthesia used: yes    Anesthesia:  Local anesthesia used: yes  Local anesthetic: Lidocaine 0.5% with epinephrine.  Anesthetic total: 2 mL  Patient tolerance: patient tolerated the procedure well with no immediate complications  Comments: Patient provided verbal and written consent to treatment.  Skin tags were " identified.  3 in the right axillary region and 3 on the left axillary region.  Skin tags were removed using a Dermblade.  Afterwards hemostasis was achieved with a Hyfrecator.            Assessment and Plan   Diagnoses and all orders for this visit:    1. Multiple acquired skin tags (Primary)  -     Skin Tag Removal    2. Tendonitis, Achilles, right    Postprocedural instructions were discussed with the patient.  He is instructed to call with any questions or concerns.  We did discuss that the skin tags have a typical appearance for skin tags.  We discussed options to send for pathology but have agreed to hold off at this time given the benign appearance.  I plan to see him back for his next regular scheduled checkup or sooner if needed.         Follow Up   No follow-ups on file.  Patient was given instructions and counseling regarding his condition or for health maintenance advice. Please see specific information pulled into the AVS if appropriate.

## 2023-12-04 NOTE — PROGRESS NOTES
"Subjective   Mina Romero is a 56 y.o. male who presents today for follow up    Referring Provider:  No referring provider defined for this encounter.    Chief Complaint:  mdd silvia    History of Present Illness:     Mina Romero is a 55 year old /White male who presents to the office today referred by Kemar Stroud DO.     Chart review: sent to us for tics. BMI is 45. No recent labs. Labs from December 2019 show elevated triglycerides, normal glucose, normal UA, elevated ALT at 51, AST is 40. Creatinine is normal, CBC, electrolytes are normal. PSA is normal. Testosterone is normal. A1c is 5.3. No outpatient head imaging.     10/7: In person.   Interview:  1. Chart review: Seen by primary care for bilateral hip pain. XR ordered as well as PT.  2. \"I take the medication and I relax into it.\"  3. No mention of tics from his colleagues.  4. The real test comes next month when he sees his sister.  a. Sees her for 6 day trip; sees her for 4 days.  5. Possible improvement in concentration.  6. Mood: \"a sense of well-being\"  7. Anxiety: better, still fidgeting  8. Sleeping: well; maintenance insomnia persisting, but later at 3 am rather than 1 am.  9. Eating: has lost weight, intentional  10. Substances: denies  11. Therapy: some interest  12. Medication compliant: yes  13. No SI HI AVH.      8/16: In person visit:  1. Review of records: Patient has a colonoscopy coming up in October.  2. EKG in February of last year: Rate 65, sinus, .  3. [facility], but I can treat his tics.  4. Patient reports he does not even know when he has to ask.  Other people simply tell him.  5. Significant depression and anxiety, see the scores below.  6. Denies SI HI AVH.    7/19: Virtual visit via Zoom audio and video due to the COVID-19 pandemic. Patient is accepting of and agreeable to appointment. The appointment consisted of the patient and " [facility]   Progress Note              Subjective:  Patient seen and examined by me. No acute events overnight.  Reports feeling better.           14 point Review of systems is negative except for as noted above.     I/O's    Intake/Output Summary (Last 24 hours) at 2023 1454  Last data filed at 2023 1200  Gross per 24 hour   Intake 500 ml   Output 750 ml   Net -250 ml         ALLERGIES:  Montelukast, Nickel, Oseltamivir, Singulair, Tamiflu, Prednisone, Tapentadol, Amoxicillin-pot clavulanate, Cephalexin, and Cephalosporins     No data recorded  MAP (mmHg)  Av.3  Min: 90  Max: 107          HOSPITAL MEDS  Current Facility-Administered Medications   Medication Dose Route Frequency Provider Last Rate Last Admin    enoxaparin (LOVENOX) injection 40 mg  40 mg Subcutaneous Q24H Mohsin, Sana, DO   40 mg at 23 1834    dexAMETHasone (DECADRON) tablet 6 mg  6 mg Oral Daily with breakfast David Griffiths MD   6 mg at 23 0854    fluticasone furoate (ARNUITY ELLIPTA) 200 MCG/ACT inhaler 1 puff  1 puff Inhalation Nightly David Griffiths MD   1 puff at 23 2218    ipratropium-albuterol (DUONEB) 0.5-2.5 (3) MG/3ML nebulizer solution 3 mL  3 mL Nebulization Q6H Resp David Griffiths MD   3 mL at 23 0153    levothyroxine (SYNTHROID, LEVOTHROID) tablet 50 mcg  50 mcg Oral QAM AC David Griffiths MD   50 mcg at 23 0623    spironolactone (ALDACTONE) tablet 50 mg  50 mg Oral Daily David Griffiths MD   50 mg at 23 0854       Current Facility-Administered Medications   Medication Dose Route Frequency Provider Last Rate Last Admin       Current Facility-Administered Medications   Medication Dose Route Frequency Provider Last Rate Last Admin    benzonatate (TESSALON PERLES) capsule 100 mg  100 mg Oral TID PRN David Griffiths MD   100 mg at 23 1231    acetaminophen (TYLENOL) tablet 650 mg  650 mg Oral Q4H PRN Tunde  "I only. Interview:   1. No one has mentioned the tic to patient recently. Has not asked his colleagues about it, however.  2. Feels stress related; has \"different\" stresses, which are a little better compared to last visit.  3. Tic center is $300 visit, cash only.  4. Has not seen his sister recently; sees her only twice a year.  5. Tried therapy before, did not have a good rapport with the therapist. Some interest.  6. Wants to meet in person to discuss medication mx.  7. No SI HI AVH.    Previous Notes:  Patient reports that about 5 or 6 years ago his friends and family began to notice that he would repeat, by muttering, his last sentence under his breath. He never notices this himself. As a consequence, it causes him very little distress. He reports that happens 2-3 times a day typically. However, when he is around his sister, she has noted that he did it up to 37 times in a day. His coworkers will pointed out to him if he asks them to do it. Patient would like to do something about this. He has never seen neurology, although he has a history of 3 head injuries/concussions from his service in [place]. [facility] not [facility].     Denies SI HI AVH.       Past Psychiatric History:  Began Psychiatric Treatment: Many years ago   Dx: Anxiety, depression, PTSD, ADHD as a child   Psychiatrist: [facility]   Therapist: Has done therapy in the past, does not have a therapist right now   : Denies   Admissions History: Denies   Medication Trials: Risperdal was sedating, Lexapro and Wellbutrin each worked for about 3 years and then they stopped working. Also tried clonazepam.   Self-Harm: Denies   Suicide Attempts: Denies     Substance Abuse History:  Types: Denies all, including illicit   Withdrawl Symptoms: Denies   Longest period sober: Not applicable   AA: N/A   Admissions History: Denies   Residential History: Denies   Legal: N/A " MD David   650 mg at 12/04/23 1231    docusate sodium-sennosides (SENOKOT S) 50-8.6 MG 1 tablet  1 tablet Oral Daily PRN Kary Hazel MD   1 tablet at 12/04/23 0901    ipratropium-albuterol (DUONEB) 0.5-2.5 (3) MG/3ML nebulizer solution 3 mL  3 mL Nebulization Q4H Resp PRN Kary Hazel MD   3 mL at 12/04/23 0951    hydrOXYzine (ATARAX) tablet 25 mg  25 mg Oral Daily PRN Kary Hazel MD   25 mg at 12/03/23 0358          Last Recorded Vitals      SpO2 Readings from Last 3 Encounters:   12/04/23 92%   11/29/23 93%   11/15/23 95%        VITAL SIGNS:     Vital Last Value 24 Hour Range   Temperature 97.9 °F (36.6 °C) (12/04/23 1053) Temp  Min: 97.7 °F (36.5 °C)  Max: 98.7 °F (37.1 °C)   Pulse 90 (12/04/23 1053) Pulse  Min: 77  Max: 90   Respiratory 18 (12/04/23 1053) Resp  Min: 18  Max: 18   Non-Invasive  Blood Pressure 120/75 (12/04/23 1053) BP  Min: 120/75  Max: 148/81   Pulse Oximetry 92 % (12/04/23 1053) SpO2  Min: 92 %  Max: 95 %   Arterial   Blood Pressure   No data recorded      Vital Today Admitted   Weight 108.4 kg (238 lb 15.7 oz) (12/03/23 0512) Weight: 109.6 kg (241 lb 10 oz) (12/02/23 0100)   Height N/A Height: 5' 6\" (167.6 cm) (12/02/23 0100)   BMI N/A BMI (Calculated): 39 (12/02/23 0100)            Physical Exam:  General: Alert and oriented, no acute distress  Eyes: no scleral icterus, no conjunctival erythema   Cardio: S1, S2, RRR, no murmur, rub, gallop or thrills noted.   Pulm: Lungs clear to auscultation bilaterally, no wheeze or rhonchi noted. No chest wall tenderness  GI: Soft, non-tender, nondistended. Normal bowel sounds auscultated x4 quadrants  : No suprapubic Tenderness, no CVA tenderness bilaterally  Ext: No upper or lower extremity edema noted. No cords palpated.   Musculoskeletal: 5/5 strength both upper and lower extremities. No joint tenderness or erythema.  Skin: No abnormal bruising or discoloration noted. No jaundice.   Psych: Appropriate mood and affect.      Social History:  Marital Status:    Employed: Yes   Employer: Works for the [employer]   Kids: 2 children   House: Lives in a house    Hx: [employer] from      Family History:  Suicide Attempts: Denies   Suicide Completions: Denies   Substance Use: Father with alcohol use disorder, paternal grandfather with alcohol use disorder.   Psychiatric Conditions: His dad had depression and bipolar disorder, his maternal cousin had schizophrenia, his sister has 2 children with Asperger's    depression, psychosis, anxiety: Not applicable     Developmental History:  Born: [place]   Siblings: A sister   Childhood: Some neglect. Patient raised his sister on his own, she was 9 years younger   High School: Completed   College: Bachelors in the [employer]           PHQ-9 Depression Screening  PHQ-9 Total Score:      Little interest or pleasure in doing things?     Feeling down, depressed, or hopeless?     Trouble falling or staying asleep, or sleeping too much?     Feeling tired or having little energy?     Poor appetite or overeating?     Feeling bad about yourself - or that you are a failure or have let yourself or your family down?     Trouble concentrating on things, such as reading the newspaper or watching television?     Moving or speaking so slowly that other people could have noticed? Or the opposite - being so fidgety or restless that you have been moving around a lot more than usual?     Thoughts that you would be better off dead, or of hurting yourself in some way?     PHQ-9 Total Score       KIMBERLY-7       Past Surgical History:  Past Surgical History:   Procedure Laterality Date   • INGUINAL HERNIA REPAIR Bilateral    • SHOULDER SURGERY     • TONSILLECTOMY         Problem List:  Patient Active Problem List   Diagnosis   • Colon cancer screening   • Allergic rhinitis   • Anxiety   • Arthritis   • Asthma   • Attention deficit disorder with hyperactivity   • COVID-19   •  Good Insight and Judgment  Neuro: No focal motor or sensory deficits noted. Pt appropriately follows commands.    Labs   Recent Labs     12/02/23  0713 12/03/23  1015   WBC 8.0 11.0   RBC 4.37 4.48   HGB 12.8 13.1   HCT 39.8 40.0    314   MCV 91.1 89.3   MCH 29.3 29.2   MCHC 32.2 32.8   NRBCRE 0 0         Recent Labs     12/01/23  1959 12/02/23  0713 12/03/23  1015   SODIUM  --  139 140   POTASSIUM  --  4.9 3.5   CO2  --  26 29   ANIONGAP  --  8 9   GLUCOSE  --  221* 105*   BUN  --  21* 20   CREATININE  --  0.98* 0.98*   CALCIUM  --  9.3 8.9   DDIMER 0.71*  --   --         Recent Labs   Lab 12/03/23  1015 12/02/23  0713 12/01/23  1324 11/29/23  1655   SODIUM 140 139 142 142   POTASSIUM 3.5 4.9 3.3* 3.9   CHLORIDE 106 110 111* 110   CO2 29 26 26 28   BUN 20 21* 21* 17   CREATININE 0.98* 0.98* 0.98* 0.97*   GLUCOSE 105* 221* 105* 109*   ALBUMIN  --   --  3.7 3.7   AST  --   --  21 27   BILIRUBIN  --   --  0.3 0.3   TSH  --   --  3.190  --        No results for input(s): \"PCT\" in the last 72 hours.     No results found     No results for input(s): \"INR\", \"PT\", \"PTT\" in the last 72 hours.    No results available in last 24 hours    Imaging    XR CHEST AP OR PA   Final Result   Impression:    No acute cardiopulmonary abnormality.      Electronically Signed by: KUNAL MURGUIA MD    Signed on: 12/3/2023 8:33 PM    Workstation ID: [id number]      CTA CHEST PULMONARY EMBOLISM   Final Result   No evidence of a filling defect in the visualized main pulmonary artery and   its major branches. No CT evidence of pulmonary embolism.      Grossly stable right lung nodules.      Fatty infiltration of the liver.         Electronically Signed by: SILVINO RAMEY MD    Signed on: 12/1/2023 11:25 PM    Workstation ID: [id number]      XR CHEST PA AND LATERAL 2 VIEWS   Final Result   Impression:   No acute cardiopulmonary findings.      Electronically Signed by: ANABELA FAGAN MD    Signed on: 12/1/2023 2:22 PM    Workstation  Depression   • Head injury   • Sinus trouble   • High blood pressure   • High cholesterol   • Other acute sinusitis   • Tic       Allergy:   No Known Allergies     Discontinued Medications:  Medications Discontinued During This Encounter   Medication Reason   • guanFACINE HCl ER (INTUNIV) 1 MG tablet sustained-release 24 hour Reorder       Current Medications:   Current Outpatient Medications   Medication Sig Dispense Refill   • cetirizine (zyrTEC) 10 MG tablet      • fluticasone (FLONASE) 50 MCG/ACT nasal spray      • guanFACINE HCl ER (INTUNIV) 1 MG tablet sustained-release 24 hour Take 2 mg by mouth Every Night for 60 days. 60 tablet 1   • hydrOXYzine (ATARAX) 10 MG tablet hydroxyzine HCl 10 mg oral tablet take 1 tablet by oral route daily   Active     • ibuprofen (ADVIL,MOTRIN) 800 MG tablet      • methocarbamol (ROBAXIN) 500 MG tablet methocarbamol 500 mg oral tablet take 2 tablets by oral route once a day (at bedtime)   Suspended     • mirtazapine (REMERON) 30 MG tablet Take 15 mg by mouth Every Night.     • montelukast (SINGULAIR) 10 MG tablet      • PEG-KCl-NaCl-NaSulf-Na Asc-C (MoviPrep) 100 g reconstituted solution powder Take 1,000 mL by mouth Every 12 (Twelve) Hours. 1000 mL 0   • prazosin (MINIPRESS) 2 MG capsule      • risperiDONE (risperDAL) 0.5 MG tablet      • rosuvastatin (CRESTOR) 10 MG tablet rosuvastatin 10 mg oral tablet take 1 tablet (10 mg) by oral route once daily   Active     • sildenafil (VIAGRA) 100 MG tablet      • urea (CARMOL) 20 % cream Apply as needed Twice daily 85 g 3   • DULoxetine (CYMBALTA) 30 MG capsule Take 30 mg by mouth Daily.     • DULoxetine (CYMBALTA) 60 MG capsule        No current facility-administered medications for this visit.       Past Medical History:  Past Medical History:   Diagnosis Date   • ADHD    • Allergic rhinitis 03/05/2019   • Allergies    • Anxiety    • Arthritis    • Asthma    • COVID-19 01/20/2021   • Emotional depression    • Head injury     X3 IN  ID: [id number]          Cultures  Microbiology Results       None            All imaging, labs, vitals and related tests have been reviewed and interpreted by me.     Assessment/Plan:      #Asthma exacerbation/ complicated by RSV/ acute respiratory failure  - - CXR shows no acute cardiopulmonary findings  - RSV PCR positive  - COVID-19/Flu PCR negative  - CTPE negative for PE  - s/p 1-hour long neb treatment x2 in the ED  - s/p IV dexamethasone (pt allergic to prednisone)  - Continue Dexamethasone 6 mg daily EOT 12/7/23   - Duonebs   - wean oxygen   - Pulm consult      #Hypothyroidism  - resume synthroid      #CKD stage 2   -Cr at baseline, will monitor                 DVT Prophylaxis:   Current Active Medications for DVT Prophylaxis (From admission, onward)           Stop     enoxaparin (LOVENOX) injection 40 mg  40 mg,   Subcutaneous,   EVERY 24 HOURS         --                   Diet: Regular Diet  Nutrition Communication  Baseline Activity: Ambulates Independently    CODE STATUS:   Code Status: Full Resuscitation      Communication: with patient, nurse     MORE than 50 MINS WERE SPENT ON THIS PATIENTS CARE TODAY. This includes the following: Reviewed all vitals, medications, new orders, I/O, labs, micro, radiology, nurses notes, pertinent consultant notes which are reflected in assessment and plan.This does not include time spent on other items of care such as smoking cessation counseling, prolonged care time, and or advanced care planning if applicable.   (Level 1 PN: 25 min, Level 2 PN: 35 min, Level 3 PN: 50 min)     Primary Care Physician  Aditi Sanchez MD Mir H Ali, MD [facility]  12/4/2023 2:54 PM           "IRAQ   • High blood pressure    • High cholesterol    • Sinus trouble    • Tic          Social History     Socioeconomic History   • Marital status:      Spouse name: Not on file   • Number of children: Not on file   • Years of education: Not on file   • Highest education level: Not on file   Tobacco Use   • Smoking status: Former Smoker   • Smokeless tobacco: Never Used   • Tobacco comment: APPROX 2 YEARS AGO    Vaping Use   • Vaping Use: Never used   Substance and Sexual Activity   • Alcohol use: Never   • Drug use: Never   • Sexual activity: Yes         Family History   Problem Relation Age of Onset   • Diabetes Mother    • Stroke Father    • Heart disease Father    • Diabetes Father    • Anxiety disorder Father    • Depression Father    • Heart disease Other         AUNT/UNCLE   • Anxiety disorder Sister      · Mental Status Exam  · Appearance  · : good eye contact, normal street clothes, groomed, wearing a mask  · Behavior  · : pleasant and cooperative  · Motor  · : fidgeting quite a bit; tapping his fingers and feet  · Speech  · : Not a single tic. Normal rhythm, rate, volume, tone, not hyperverbal, not pressured, normal prosidy, fidgeting  · Mood  · : \"I have a sense of well-being\"  · Affect  · : Slight constriction, slight guarding, improved from last visit  · Thought Content  · : negative suicidal ideations, negative homicidal ideations, negative obsessions  · Perceptions  · : negative auditory hallucinations, negative visual hallucinations  · Thought Process  · : linear  · Insight/Judgement  · : fair/fair  · Cognition  · : grossly intact  · Attention  · : intact    Review of Systems:  Review of Systems   Constitutional: Negative for diaphoresis and fatigue.   HENT: Negative for drooling.    Eyes: Negative for visual disturbance.   Respiratory: Negative for cough and shortness of breath.    Cardiovascular: Positive for leg swelling. Negative for chest pain and palpitations.   Gastrointestinal: " "Negative for nausea and vomiting.   Endocrine: Negative for cold intolerance and heat intolerance.   Genitourinary: Negative for difficulty urinating.   Musculoskeletal: Positive for joint swelling.   Allergic/Immunologic: Negative for immunocompromised state.   Neurological: Negative for dizziness, seizures, speech difficulty and numbness.         Physical Exam:  Physical Exam    Vital Signs:   /67   Ht 182.9 cm (72\")   Wt (!) 136 kg (300 lb 9.6 oz)   BMI 40.77 kg/m²      Lab Results:   No visits with results within 6 Month(s) from this visit.   Latest known visit with results is:   No results found for any previous visit.       EKG Results:  No orders to display       Imaging Results:  No Images in the past 120 days found..      Assessment/Plan   Diagnoses and all orders for this visit:    1. Recurrent major depressive disorder in remission (HCC) (Primary)    2. History of tics  -     guanFACINE HCl ER (INTUNIV) 1 MG tablet sustained-release 24 hour; Take 2 mg by mouth Every Night for 60 days.  Dispense: 60 tablet; Refill: 1    3. Generalized anxiety disorder  -     guanFACINE HCl ER (INTUNIV) 1 MG tablet sustained-release 24 hour; Take 2 mg by mouth Every Night for 60 days.  Dispense: 60 tablet; Refill: 1    4. ADHD (attention deficit hyperactivity disorder), inattentive type  -     guanFACINE HCl ER (INTUNIV) 1 MG tablet sustained-release 24 hour; Take 2 mg by mouth Every Night for 60 days.  Dispense: 60 tablet; Refill: 1    5. Post traumatic stress disorder (PTSD)      Presentation most consistent with above, plus possible vocal tic.  Patient sometimes repeats the last phrase of what he stated under his breath.  It is worsened by anxiety, improved when he is not anxious.    10/7: Better, but some fidgeting, some insomnia. Increase guanfacine. 8 wks    8/16: [facility] tics, anxiety, ADHD with guanfacine ER.  6 weeks.    7/19: No tics today.  Anxiety is possibly improved.  HRT therapy at the clinic in " [place] is too expensive.  Wants to meet in a month in person to discuss medication management.  Also some interest in psychotherapy.  See the patient back in 1 month.  If he does need to be on a medication, I would start him on guanfacine as this treats both tics and ADHD. Consider neurology consult.      Visit Diagnoses:    ICD-10-CM ICD-9-CM   1. Recurrent major depressive disorder in remission (HCC)  F33.40 296.35   2. History of tics  Z86.59 V11.8   3. Generalized anxiety disorder  F41.1 300.02   4. ADHD (attention deficit hyperactivity disorder), inattentive type  F90.0 314.00   5. Post traumatic stress disorder (PTSD)  F43.10 309.81       PLAN:  14. Risk Assessment: Risk of self-harm acutely is moderate. Risk factors include anxiety disorder, depressive disorder, recent psychosocial stressors (pandemic). Protective factors include no family history, no present SI, no history of suicide attempts or self-harm in the past, no access to weapons, minimal AODA, healthcare seeking, future orientation, willingness to engage in care. Risk of self-harm chronically is also moderate, but could be further elevated in the event of treatment noncompliance and/or AODA.  15. Safety: No acute safety concerns.  16. Medications:  a. CONTINUE duloxetine 30 mg a day  b. risperidone 0.5 mg a day  c. mirtazapine 30 mg at night  d. prazosin 2 mg at night.  e. INCREASE guanfacine ER 1 to 2 mg PO QHS. Risks, benefits, alternatives discussed with patient and mom including sedation, dizziness, GI upset, dry mouth, constipation, hypotension. After discussion of these risks and benefits, the patient and mom voiced understanding and agreed to proceed.  17. Therapy: Some interest.  18. Follow Up: 8 weeks in person.      TREATMENT PLAN/GOALS: Continue supportive psychotherapy efforts and medications as indicated. Treatment and medication options discussed during today's visit. Patient acknowledged and verbally consented to continue with  current treatment plan and was educated on the importance of compliance with treatment and follow-up appointments.    MEDICATION ISSUES:  YOUSIF reviewed as expected.  Discussed medication options and treatment plan of prescribed medication as well as the risks, benefits, and side effects including potential falls, possible impaired driving and metabolic adversities among others. Patient is agreeable to call the office with any worsening of symptoms or onset of side effects. Patient is agreeable to call 911 or go to the nearest ER should he/she begin having SI/HI. No medication side effects or related complaints today.     MEDS ORDERED DURING VISIT:  New Medications Ordered This Visit   Medications   • guanFACINE HCl ER (INTUNIV) 1 MG tablet sustained-release 24 hour     Sig: Take 2 mg by mouth Every Night for 60 days.     Dispense:  60 tablet     Refill:  1       Return in about 8 weeks (around 12/2/2021).         This document has been electronically signed by Analia Black MD  October 7, 2021 15:16 EDT      Part of this note may be an electronic transcription/translation of spoken language to printed text using the Dragon Dictation System.

## 2023-12-18 ENCOUNTER — OFFICE VISIT (OUTPATIENT)
Dept: PODIATRY | Facility: CLINIC | Age: 58
End: 2023-12-18
Payer: OTHER GOVERNMENT

## 2023-12-18 VITALS
DIASTOLIC BLOOD PRESSURE: 89 MMHG | SYSTOLIC BLOOD PRESSURE: 130 MMHG | HEART RATE: 69 BPM | WEIGHT: 315 LBS | TEMPERATURE: 98.4 F | BODY MASS INDEX: 45.57 KG/M2 | OXYGEN SATURATION: 96 %

## 2023-12-18 DIAGNOSIS — M77.51 POSTCALCANEAL BURSITIS OF RIGHT FOOT: Primary | ICD-10-CM

## 2023-12-18 DIAGNOSIS — M62.461 GASTROCNEMIUS EQUINUS OF RIGHT LOWER EXTREMITY: ICD-10-CM

## 2023-12-18 DIAGNOSIS — M76.61 ACHILLES TENDINITIS OF RIGHT LOWER EXTREMITY: ICD-10-CM

## 2023-12-19 NOTE — H&P (VIEW-ONLY)
[facility] - PODIATRY    Today's Date: 12/19/23    Patient Name: Mina Romero  MRN: [id number]  CSN: [id number]  PCP: Kemar Stroud DO,  Referring Provider: Kemar Stroud DO    SUBJECTIVE     Chief Complaint   Patient presents with    Right Foot - Pre-op Exam     Surgery preop visit     HPI: Mina Romero, a 58 y.o.male, presents to clinic.    Patient is a 58-year-old male that states during March and April he was chasing his dog and he started having some discomfort in his right heel.  Patient went into a boot and then subsequently did physical therapy.  He states that it did improve but he still having trouble going up and down steps putting full weight on that foot.  Is  where the Achilles tendon inserts.    10/23/23-patient continues to have pain in his Achilles tendon area.  Patient states he feels that his ankle is tight.    12/18/2023-patient is here for follow-up of his right Achilles tightness.  Patient would like to proceed with gastroc lengthening.  Past Medical History:   Diagnosis Date    ADHD     Allergic rhinitis 03/05/2019    Allergies     Anxiety     Arthritis     Asthma     COVID-19 01/20/2021    Emotional depression     Head injury     X3 IN [place]    High blood pressure     High cholesterol     Sinus trouble     Tic      Past Surgical History:   Procedure Laterality Date    COLONOSCOPY      COLONOSCOPY N/A 10/14/2021    Procedure: COLONOSCOPY;  Surgeon: Riley Ryder MD;  Location: [facility] ENDOSCOPY;  Service: Gastroenterology;  Laterality: N/A;  DIVERTICULOSIS, HEMORRHOIDS    INGUINAL HERNIA REPAIR Bilateral     SHOULDER SURGERY  1995    TONSILLECTOMY  1976     Family History   Problem Relation Age of Onset    Diabetes Mother     Stroke Father     Heart disease Father     Diabetes Father     Anxiety disorder Father     Depression Father     Heart disease Other         AUNT/UNCLE    Anxiety disorder Sister      Social History     Socioeconomic History     Marital status:    Tobacco Use    Smoking status: Former     Packs/day: 1.00     Years: 2.00     Additional pack years: 0.00     Total pack years: 2.00     Types: Cigarettes     Quit date: 2008     Years since quitting: 15.9    Smokeless tobacco: Never    Tobacco comments:     APPROX 20 YEARS AGO    Vaping Use    Vaping Use: Never used   Substance and Sexual Activity    Alcohol use: Never    Drug use: Never     Allergies   Allergen Reactions    Latex Rash     Current Outpatient Medications   Medication Sig Dispense Refill    aspirin 81 MG EC tablet Take 1 tablet by mouth Daily. 90 tablet 1    cetirizine (zyrTEC) 10 MG tablet 1 tablet.      DULoxetine (CYMBALTA) 60 MG capsule 1 capsule Daily.      fluticasone (FLONASE) 50 MCG/ACT nasal spray 2 sprays into the nostril(s) as directed by provider Every Night.      ibuprofen (ADVIL,MOTRIN) 800 MG tablet       mirtazapine (REMERON) 30 MG tablet Take  by mouth. 0.5 tab qd      montelukast (SINGULAIR) 10 MG tablet Take 1 tablet by mouth At Night As Needed.      prazosin (MINIPRESS) 2 MG capsule Take 1 capsule by mouth Every Night.      rosuvastatin (CRESTOR) 10 MG tablet Take 1 tablet by mouth Every Night.      sildenafil (VIAGRA) 100 MG tablet       urea (CARMOL) 20 % cream Apply as needed Twice daily 85 g 3     No current facility-administered medications for this visit.     Review of Systems   Musculoskeletal:         Right heel pain   All other systems reviewed and are negative.      OBJECTIVE     Vitals:    12/18/23 1518   BP: 130/89   Pulse: 69   Temp: 98.4 °F (36.9 °C)   SpO2: 96%       WBC   Date Value Ref Range Status   08/31/2022 6.59 3.40 - 10.80 10*3/mm3 Final     RBC   Date Value Ref Range Status   08/31/2022 5.17 4.14 - 5.80 10*6/mm3 Final     Hemoglobin   Date Value Ref Range Status   08/31/2022 15.3 13.0 - 17.7 g/dL Final     Hematocrit   Date Value Ref Range Status   08/31/2022 44.8 37.5 - 51.0 % Final     MCV   Date Value Ref Range Status    08/31/2022 86.7 79.0 - 97.0 fL Final     MCH   Date Value Ref Range Status   08/31/2022 29.6 26.6 - 33.0 pg Final     MCHC   Date Value Ref Range Status   08/31/2022 34.2 31.5 - 35.7 g/dL Final     RDW   Date Value Ref Range Status   08/31/2022 12.7 12.3 - 15.4 % Final     RDW-SD   Date Value Ref Range Status   08/31/2022 39.9 37.0 - 54.0 fl Final     MPV   Date Value Ref Range Status   08/31/2022 9.6 6.0 - 12.0 fL Final     Platelets   Date Value Ref Range Status   08/31/2022 227 140 - 450 10*3/mm3 Final     Neutrophil %   Date Value Ref Range Status   08/31/2022 63.5 42.7 - 76.0 % Final     Lymphocyte %   Date Value Ref Range Status   08/31/2022 25.9 19.6 - 45.3 % Final     Monocyte %   Date Value Ref Range Status   08/31/2022 8.6 5.0 - 12.0 % Final     Eosinophil %   Date Value Ref Range Status   08/31/2022 1.2 0.3 - 6.2 % Final     Basophil %   Date Value Ref Range Status   08/31/2022 0.5 0.0 - 1.5 % Final     Immature Grans %   Date Value Ref Range Status   08/31/2022 0.3 0.0 - 0.5 % Final     Neutrophils, Absolute   Date Value Ref Range Status   08/31/2022 4.18 1.70 - 7.00 10*3/mm3 Final     Lymphocytes, Absolute   Date Value Ref Range Status   08/31/2022 1.71 0.70 - 3.10 10*3/mm3 Final     Monocytes, Absolute   Date Value Ref Range Status   08/31/2022 0.57 0.10 - 0.90 10*3/mm3 Final     Eosinophils, Absolute   Date Value Ref Range Status   08/31/2022 0.08 0.00 - 0.40 10*3/mm3 Final     Basophils, Absolute   Date Value Ref Range Status   08/31/2022 0.03 0.00 - 0.20 10*3/mm3 Final     Immature Grans, Absolute   Date Value Ref Range Status   08/31/2022 0.02 0.00 - 0.05 10*3/mm3 Final     nRBC   Date Value Ref Range Status   08/31/2022 0.0 0.0 - 0.2 /100 WBC Final         Lab Results   Component Value Date    GLUCOSE 73 08/31/2022    BUN 17 08/31/2022    CREATININE 1.21 08/31/2022    BCR 14.0 08/31/2022    K 4.2 08/31/2022    CO2 25.2 08/31/2022    CALCIUM 9.3 08/31/2022    ALBUMIN 4.10 08/31/2022    LABIL2 1.7  12/18/2019    AST 30 08/31/2022    ALT 31 08/31/2022       Patient seen in no apparent distress.      PHYSICAL EXAM:     Foot/Ankle Exam    GENERAL  Appearance:  appears stated age  Orientation:  AAOx3  Affect:  appropriate  Gait:  unimpaired  Assistance:  independent  Right shoe gear: casual shoe  Left shoe gear: casual shoe    VASCULAR     Right Foot Vascularity   Normal vascular exam    Dorsalis pedis:  2+  Posterior tibial:  2+  Skin temperature:  warm  Edema grading:  None  CFT:  < 3 seconds  Pedal hair growth:  Present  Varicosities:  none     Left Foot Vascularity   Normal vascular exam    Dorsalis pedis:  2+  Posterior tibial:  2+  Skin temperature:  warm  Edema grading:  None  CFT:  < 3 seconds  Pedal hair growth:  Present  Varicosities:  none     NEUROLOGIC     Right Foot Neurologic   Normal sensation    Light touch sensation: normal  Vibratory sensation: normal  Hot/Cold sensation: normal  Protective Sensation using [place]osalinda Monofilament:   Sites intact: 10  Sites tested: 10     Left Foot Neurologic   Normal sensation    Light touch sensation: normal  Vibratory sensation: normal  Hot/Cold sensation:  normal  Protective Sensation using [place] Monofilament:   Sites intact: 10  Sites tested: 10    MUSCLE STRENGTH     Right Foot Muscle Strength   Foot dorsiflexion:  4  Foot plantar flexion:  4  Foot inversion:  4  Foot eversion:  4     Left Foot Muscle Strength   Foot dorsiflexion:  4  Foot plantar flexion:  4  Foot inversion:  4  Foot eversion:  4    RANGE OF MOTION     Right Foot Range of Motion   Foot and ankle ROM within normal limits    Ankle dorsiflexion: decreased  (Gastrocnemius equinus to right lower extremity.)     Left Foot Range of Motion   Foot and ankle ROM within normal limits      DERMATOLOGIC      Right Foot Dermatologic   Skin  Right foot skin is intact.      Left Foot Dermatologic   Skin  Left foot skin is intact.      Right foot additional comments: Pain to insertion of  Achilles tendon.  Mild pain on range of motion of ankle.      RADIOLOGY:          No results found.    ASSESSMENT/PLAN     Diagnoses and all orders for this visit:    1. Postcalcaneal bursitis of right foot (Primary)    2. Achilles tendinitis of right lower extremity    3. Gastrocnemius equinus of right lower extremity        Patient has failed multiple conservative measures for this issue.       Patient with significant gastrocnemius equinus to the right lower extremity.  For a gastroc lengthening to right lower extremity.  No guarantees were made 100% relief of symptoms.    The risks and benefits of the surgery were discussed with the patient.  This discussion included possible complications of requiring further surgery, possible delayed wound healing, further surgery requiring amputation of the foot or leg, and also included the complication of death.  All the patient's questions were answered to their satisfaction.  Patient states they would like to proceed with the procedure.    Comprehensive lower extremity examination and evaluation was performed.    Discussed findings and treatment plan including risks, benefits, and treatment options with patient in detail. Patient agreed with treatment plan.    Medications and allergies reviewed.  Reviewed available lab values along with other pertinent labs.  These were discussed with the patient.    An After Visit Summary was printed and given to the patient at discharge, including (if requested) any available informative/educational handouts regarding diagnosis, treatment, or medications. All questions were answered to patient/family satisfaction. Should symptoms fail to improve or worsen they agree to call or return to clinic or to go to the Emergency Department. Discussed the importance of following up with any needed screening tests/labs/specialist appointments and any requested follow-up recommended by me today. Importance of maintaining follow-up discussed and  patient accepts that missed appointments can delay diagnosis and potentially lead to worsening of conditions.    Return for Post op., or sooner if acute issues arise.    This document has been electronically signed by Grant Aiken DPM on December 19, 2023 07:10 EST

## 2023-12-19 NOTE — PROGRESS NOTES
[facility] - PODIATRY    Today's Date: 12/19/23    Patient Name: Mina Romero  MRN: [id number]  CSN: [id number]  PCP: Kemar Stroud DO,  Referring Provider: Kemar Stroud DO    SUBJECTIVE     Chief Complaint   Patient presents with    Right Foot - Pre-op Exam     Surgery preop visit     HPI: Mina Romero, a 58 y.o.male, presents to clinic.    Patient is a 58-year-old male that states during March and April he was chasing his dog and he started having some discomfort in his right heel.  Patient went into a boot and then subsequently did physical therapy.  He states that it did improve but he still having trouble going up and down steps putting full weight on that foot.  Is  where the Achilles tendon inserts.    10/23/23-patient continues to have pain in his Achilles tendon area.  Patient states he feels that his ankle is tight.    12/18/2023-patient is here for follow-up of his right Achilles tightness.  Patient would like to proceed with gastroc lengthening.  Past Medical History:   Diagnosis Date    ADHD     Allergic rhinitis 03/05/2019    Allergies     Anxiety     Arthritis     Asthma     COVID-19 01/20/2021    Emotional depression     Head injury     X3 IN [place]    High blood pressure     High cholesterol     Sinus trouble     Tic      Past Surgical History:   Procedure Laterality Date    COLONOSCOPY      COLONOSCOPY N/A 10/14/2021    Procedure: COLONOSCOPY;  Surgeon: Riley Ryder MD;  Location: [facility] ENDOSCOPY;  Service: Gastroenterology;  Laterality: N/A;  DIVERTICULOSIS, HEMORRHOIDS    INGUINAL HERNIA REPAIR Bilateral     SHOULDER SURGERY  1995    TONSILLECTOMY  1976     Family History   Problem Relation Age of Onset    Diabetes Mother     Stroke Father     Heart disease Father     Diabetes Father     Anxiety disorder Father     Depression Father     Heart disease Other         AUNT/UNCLE    Anxiety disorder Sister      Social History     Socioeconomic History     Marital status:    Tobacco Use    Smoking status: Former     Packs/day: 1.00     Years: 2.00     Additional pack years: 0.00     Total pack years: 2.00     Types: Cigarettes     Quit date: 2008     Years since quitting: 15.9    Smokeless tobacco: Never    Tobacco comments:     APPROX 20 YEARS AGO    Vaping Use    Vaping Use: Never used   Substance and Sexual Activity    Alcohol use: Never    Drug use: Never     Allergies   Allergen Reactions    Latex Rash     Current Outpatient Medications   Medication Sig Dispense Refill    aspirin 81 MG EC tablet Take 1 tablet by mouth Daily. 90 tablet 1    cetirizine (zyrTEC) 10 MG tablet 1 tablet.      DULoxetine (CYMBALTA) 60 MG capsule 1 capsule Daily.      fluticasone (FLONASE) 50 MCG/ACT nasal spray 2 sprays into the nostril(s) as directed by provider Every Night.      ibuprofen (ADVIL,MOTRIN) 800 MG tablet       mirtazapine (REMERON) 30 MG tablet Take  by mouth. 0.5 tab qd      montelukast (SINGULAIR) 10 MG tablet Take 1 tablet by mouth At Night As Needed.      prazosin (MINIPRESS) 2 MG capsule Take 1 capsule by mouth Every Night.      rosuvastatin (CRESTOR) 10 MG tablet Take 1 tablet by mouth Every Night.      sildenafil (VIAGRA) 100 MG tablet       urea (CARMOL) 20 % cream Apply as needed Twice daily 85 g 3     No current facility-administered medications for this visit.     Review of Systems   Musculoskeletal:         Right heel pain   All other systems reviewed and are negative.      OBJECTIVE     Vitals:    12/18/23 1518   BP: 130/89   Pulse: 69   Temp: 98.4 °F (36.9 °C)   SpO2: 96%       WBC   Date Value Ref Range Status   08/31/2022 6.59 3.40 - 10.80 10*3/mm3 Final     RBC   Date Value Ref Range Status   08/31/2022 5.17 4.14 - 5.80 10*6/mm3 Final     Hemoglobin   Date Value Ref Range Status   08/31/2022 15.3 13.0 - 17.7 g/dL Final     Hematocrit   Date Value Ref Range Status   08/31/2022 44.8 37.5 - 51.0 % Final     MCV   Date Value Ref Range Status    08/31/2022 86.7 79.0 - 97.0 fL Final     MCH   Date Value Ref Range Status   08/31/2022 29.6 26.6 - 33.0 pg Final     MCHC   Date Value Ref Range Status   08/31/2022 34.2 31.5 - 35.7 g/dL Final     RDW   Date Value Ref Range Status   08/31/2022 12.7 12.3 - 15.4 % Final     RDW-SD   Date Value Ref Range Status   08/31/2022 39.9 37.0 - 54.0 fl Final     MPV   Date Value Ref Range Status   08/31/2022 9.6 6.0 - 12.0 fL Final     Platelets   Date Value Ref Range Status   08/31/2022 227 140 - 450 10*3/mm3 Final     Neutrophil %   Date Value Ref Range Status   08/31/2022 63.5 42.7 - 76.0 % Final     Lymphocyte %   Date Value Ref Range Status   08/31/2022 25.9 19.6 - 45.3 % Final     Monocyte %   Date Value Ref Range Status   08/31/2022 8.6 5.0 - 12.0 % Final     Eosinophil %   Date Value Ref Range Status   08/31/2022 1.2 0.3 - 6.2 % Final     Basophil %   Date Value Ref Range Status   08/31/2022 0.5 0.0 - 1.5 % Final     Immature Grans %   Date Value Ref Range Status   08/31/2022 0.3 0.0 - 0.5 % Final     Neutrophils, Absolute   Date Value Ref Range Status   08/31/2022 4.18 1.70 - 7.00 10*3/mm3 Final     Lymphocytes, Absolute   Date Value Ref Range Status   08/31/2022 1.71 0.70 - 3.10 10*3/mm3 Final     Monocytes, Absolute   Date Value Ref Range Status   08/31/2022 0.57 0.10 - 0.90 10*3/mm3 Final     Eosinophils, Absolute   Date Value Ref Range Status   08/31/2022 0.08 0.00 - 0.40 10*3/mm3 Final     Basophils, Absolute   Date Value Ref Range Status   08/31/2022 0.03 0.00 - 0.20 10*3/mm3 Final     Immature Grans, Absolute   Date Value Ref Range Status   08/31/2022 0.02 0.00 - 0.05 10*3/mm3 Final     nRBC   Date Value Ref Range Status   08/31/2022 0.0 0.0 - 0.2 /100 WBC Final         Lab Results   Component Value Date    GLUCOSE 73 08/31/2022    BUN 17 08/31/2022    CREATININE 1.21 08/31/2022    BCR 14.0 08/31/2022    K 4.2 08/31/2022    CO2 25.2 08/31/2022    CALCIUM 9.3 08/31/2022    ALBUMIN 4.10 08/31/2022    LABIL2 1.7  12/18/2019    AST 30 08/31/2022    ALT 31 08/31/2022       Patient seen in no apparent distress.      PHYSICAL EXAM:     Foot/Ankle Exam    GENERAL  Appearance:  appears stated age  Orientation:  AAOx3  Affect:  appropriate  Gait:  unimpaired  Assistance:  independent  Right shoe gear: casual shoe  Left shoe gear: casual shoe    VASCULAR     Right Foot Vascularity   Normal vascular exam    Dorsalis pedis:  2+  Posterior tibial:  2+  Skin temperature:  warm  Edema grading:  None  CFT:  < 3 seconds  Pedal hair growth:  Present  Varicosities:  none     Left Foot Vascularity   Normal vascular exam    Dorsalis pedis:  2+  Posterior tibial:  2+  Skin temperature:  warm  Edema grading:  None  CFT:  < 3 seconds  Pedal hair growth:  Present  Varicosities:  none     NEUROLOGIC     Right Foot Neurologic   Normal sensation    Light touch sensation: normal  Vibratory sensation: normal  Hot/Cold sensation: normal  Protective Sensation using [place]osalinda Monofilament:   Sites intact: 10  Sites tested: 10     Left Foot Neurologic   Normal sensation    Light touch sensation: normal  Vibratory sensation: normal  Hot/Cold sensation:  normal  Protective Sensation using [place]inda Monofilament:   Sites intact: 10  Sites tested: 10    MUSCLE STRENGTH     Right Foot Muscle Strength   Foot dorsiflexion:  4  Foot plantar flexion:  4  Foot inversion:  4  Foot eversion:  4     Left Foot Muscle Strength   Foot dorsiflexion:  4  Foot plantar flexion:  4  Foot inversion:  4  Foot eversion:  4    RANGE OF MOTION     Right Foot Range of Motion   Foot and ankle ROM within normal limits    Ankle dorsiflexion: decreased  (Gastrocnemius equinus to right lower extremity.)     Left Foot Range of Motion   Foot and ankle ROM within normal limits      DERMATOLOGIC      Right Foot Dermatologic   Skin  Right foot skin is intact.      Left Foot Dermatologic   Skin  Left foot skin is intact.      Right foot additional comments: Pain to insertion of  Achilles tendon.  Mild pain on range of motion of ankle.      RADIOLOGY:          No results found.    ASSESSMENT/PLAN     Diagnoses and all orders for this visit:    1. Postcalcaneal bursitis of right foot (Primary)    2. Achilles tendinitis of right lower extremity    3. Gastrocnemius equinus of right lower extremity        Patient has failed multiple conservative measures for this issue.       Patient with significant gastrocnemius equinus to the right lower extremity.  For a gastroc lengthening to right lower extremity.  No guarantees were made 100% relief of symptoms.    The risks and benefits of the surgery were discussed with the patient.  This discussion included possible complications of requiring further surgery, possible delayed wound healing, further surgery requiring amputation of the foot or leg, and also included the complication of death.  All the patient's questions were answered to their satisfaction.  Patient states they would like to proceed with the procedure.    Comprehensive lower extremity examination and evaluation was performed.    Discussed findings and treatment plan including risks, benefits, and treatment options with patient in detail. Patient agreed with treatment plan.    Medications and allergies reviewed.  Reviewed available lab values along with other pertinent labs.  These were discussed with the patient.    An After Visit Summary was printed and given to the patient at discharge, including (if requested) any available informative/educational handouts regarding diagnosis, treatment, or medications. All questions were answered to patient/family satisfaction. Should symptoms fail to improve or worsen they agree to call or return to clinic or to go to the Emergency Department. Discussed the importance of following up with any needed screening tests/labs/specialist appointments and any requested follow-up recommended by me today. Importance of maintaining follow-up discussed and  patient accepts that missed appointments can delay diagnosis and potentially lead to worsening of conditions.    Return for Post op., or sooner if acute issues arise.    This document has been electronically signed by Grant Aiken DPM on December 19, 2023 07:10 EST         The Delivery OB Provider certifies that vaginal examination and/or abdominal examination after the delivery was done and no foreign body was found.

## 2023-12-22 PROBLEM — M62.461 GASTROCNEMIUS EQUINUS OF RIGHT LOWER EXTREMITY: Status: ACTIVE | Noted: 2023-10-23

## 2024-01-08 NOTE — PRE-PROCEDURE INSTRUCTIONS
PATIENT INSTRUCTED TO BE:    - NOTHING TO EAT AFTER MIDNIGHT OR CHEW, EXCEPT CAN HAVE CLEAR LIQUIDS 2 HOURS PRIOR TO SURGERY ARRIVAL TIME     - TO HOLD ALL VITAMINS, SUPPLEMENTS, NSAIDS FOR ONE WEEK PRIOR TO THEIR SURGICAL PROCEDURE    - DO NOT TAKE __---------------__ 7 DAYS PRIOR TO PROCEDURE PER ANESTHESIA RECOMMENDATIONS/INSTRUCTIONS     - INSTRUCTED PT TO USE SURGICAL SOAP 1 TIME THE NIGHT PRIOR TO SURGERY OR THE AM OF SURGERY.   USE SOAP FROM NECK TO TOES AVOID THEIR FACE, HAIR, AND PRIVATE PARTS. INSTRUCTED NO LOTIONS, JEWELRY, PIERCINGS, OR DEODORANT DAY OF SURGERY    - IF DIABETIC, CHECK BLOOD GLUCOSE IF LESS THAN 70 OR HAVING SYMPTOMS CALL THE PREOP AREA FOR INSTRUCTIONS ON AM OF SURGERY ([phone])    -INSTRUCTED TO TAKE THE FOLLOWING MEDICATIONS THE DAY OF SURGERY:            ZYRTEC, CYMBALTA, REMERON         - DO NOT BRING ANY MEDICATIONS WITH YOU TO THE HOSPITAL THE DAY OF SURGERY, EXCEPT IF USE INHALERS. BRING INHALERS DAY OF SURGERY       - BRING CPAP OR BIPAP TO THE HOSPITAL ONLY IF ARE SPENDING THE NIGHT    - DO NOT SMOKE OR VAPE 24 HOURS PRIOR TO PROCEDURE PER ANESTHESIA REQUEST     -MAKE SURE YOU HAVE A RIDE HOME OR SOMEONE TO STAY WITH YOU THE DAY OF THE PROCEDURE AFTER YOU GO HOME    - FOLLOW ANY OTHER INSTRUCTIONS GIVEN TO YOU BY YOUR SURGEON'S OFFICE.     - PREADMISSION TESTING NURSE- RADHIKA CLIFTON [phone] IF HAVE ANY QUESTIONS     PATIENT PROVIDED THE NUMBER FOR PREOP SURGICAL DEPT IF HAD QUESTIONS AFTER HOURS PRIOR TO SURGERY ([phone])  INFORMED PT IF NO ANSWER, LEAVE A MESSAGE AND SOMEONE WILL RETURN THEIR CALL       PATIENT VERBALIZED UNDERSTANDING

## 2024-01-09 ENCOUNTER — ANESTHESIA EVENT (OUTPATIENT)
Dept: PERIOP | Facility: HOSPITAL | Age: 59
End: 2024-01-09
Payer: OTHER GOVERNMENT

## 2024-01-09 ENCOUNTER — HOSPITAL ENCOUNTER (OUTPATIENT)
Facility: HOSPITAL | Age: 59
Setting detail: HOSPITAL OUTPATIENT SURGERY
Discharge: HOME | End: 2024-01-09
Attending: PODIATRIST | Admitting: PODIATRIST
Payer: OTHER GOVERNMENT

## 2024-01-09 ENCOUNTER — ANESTHESIA (OUTPATIENT)
Dept: PERIOP | Facility: HOSPITAL | Age: 59
End: 2024-01-09
Payer: OTHER GOVERNMENT

## 2024-01-09 ENCOUNTER — TELEPHONE (OUTPATIENT)
Dept: PODIATRY | Facility: CLINIC | Age: 59
End: 2024-01-09

## 2024-01-09 VITALS
HEIGHT: 72 IN | DIASTOLIC BLOOD PRESSURE: 71 MMHG | WEIGHT: 315 LBS | HEART RATE: 87 BPM | TEMPERATURE: 98 F | RESPIRATION RATE: 16 BRPM | OXYGEN SATURATION: 95 % | SYSTOLIC BLOOD PRESSURE: 137 MMHG | BODY MASS INDEX: 42.66 KG/M2

## 2024-01-09 DIAGNOSIS — M62.461 GASTROCNEMIUS EQUINUS OF RIGHT LOWER EXTREMITY: Primary | ICD-10-CM

## 2024-01-09 PROCEDURE — 63710000001 PROMETHAZINE PER 12.5 MG: Performed by: NURSE ANESTHETIST, CERTIFIED REGISTERED

## 2024-01-09 PROCEDURE — 25010000002 FENTANYL CITRATE (PF) 50 MCG/ML SOLUTION: Performed by: NURSE ANESTHETIST, CERTIFIED REGISTERED

## 2024-01-09 PROCEDURE — 25010000002 DEXAMETHASONE PER 1 MG: Performed by: NURSE ANESTHETIST, CERTIFIED REGISTERED

## 2024-01-09 PROCEDURE — 25010000002 KETOROLAC TROMETHAMINE PER 15 MG: Performed by: NURSE ANESTHETIST, CERTIFIED REGISTERED

## 2024-01-09 PROCEDURE — 25810000003 LACTATED RINGERS PER 1000 ML: Performed by: ANESTHESIOLOGY

## 2024-01-09 PROCEDURE — 25010000002 MIDAZOLAM PER 1MG: Performed by: ANESTHESIOLOGY

## 2024-01-09 PROCEDURE — 25010000002 CEFAZOLIN PER 500 MG: Performed by: PODIATRIST

## 2024-01-09 PROCEDURE — 25010000002 HYDROMORPHONE 1 MG/ML SOLUTION: Performed by: NURSE ANESTHETIST, CERTIFIED REGISTERED

## 2024-01-09 PROCEDURE — 25010000002 ONDANSETRON PER 1 MG: Performed by: NURSE ANESTHETIST, CERTIFIED REGISTERED

## 2024-01-09 PROCEDURE — 25010000002 BUPIVACAINE (PF) 0.5 % SOLUTION: Performed by: PODIATRIST

## 2024-01-09 PROCEDURE — 25010000002 PROPOFOL 10 MG/ML EMULSION: Performed by: NURSE ANESTHETIST, CERTIFIED REGISTERED

## 2024-01-09 RX ORDER — PROMETHAZINE HYDROCHLORIDE 12.5 MG/1
25 TABLET ORAL ONCE AS NEEDED
Status: COMPLETED | OUTPATIENT
Start: 2024-01-09 | End: 2024-01-09

## 2024-01-09 RX ORDER — ONDANSETRON 2 MG/ML
INJECTION INTRAMUSCULAR; INTRAVENOUS AS NEEDED
Status: DISCONTINUED | OUTPATIENT
Start: 2024-01-09 | End: 2024-01-09 | Stop reason: SURG

## 2024-01-09 RX ORDER — LIDOCAINE HYDROCHLORIDE 20 MG/ML
INJECTION, SOLUTION EPIDURAL; INFILTRATION; INTRACAUDAL; PERINEURAL AS NEEDED
Status: DISCONTINUED | OUTPATIENT
Start: 2024-01-09 | End: 2024-01-09 | Stop reason: SURG

## 2024-01-09 RX ORDER — MIDAZOLAM HYDROCHLORIDE 2 MG/2ML
2 INJECTION, SOLUTION INTRAMUSCULAR; INTRAVENOUS ONCE
Status: COMPLETED | OUTPATIENT
Start: 2024-01-09 | End: 2024-01-09

## 2024-01-09 RX ORDER — DEXAMETHASONE SODIUM PHOSPHATE 4 MG/ML
INJECTION, SOLUTION INTRA-ARTICULAR; INTRALESIONAL; INTRAMUSCULAR; INTRAVENOUS; SOFT TISSUE AS NEEDED
Status: DISCONTINUED | OUTPATIENT
Start: 2024-01-09 | End: 2024-01-09 | Stop reason: SURG

## 2024-01-09 RX ORDER — PROMETHAZINE HYDROCHLORIDE 25 MG/1
25 TABLET ORAL EVERY 6 HOURS PRN
Qty: 20 TABLET | Refills: 0 | Status: SHIPPED | OUTPATIENT
Start: 2024-01-09

## 2024-01-09 RX ORDER — KETOROLAC TROMETHAMINE 30 MG/ML
INJECTION, SOLUTION INTRAMUSCULAR; INTRAVENOUS AS NEEDED
Status: DISCONTINUED | OUTPATIENT
Start: 2024-01-09 | End: 2024-01-09 | Stop reason: SURG

## 2024-01-09 RX ORDER — MAGNESIUM HYDROXIDE 1200 MG/15ML
LIQUID ORAL AS NEEDED
Status: DISCONTINUED | OUTPATIENT
Start: 2024-01-09 | End: 2024-01-09 | Stop reason: HOSPADM

## 2024-01-09 RX ORDER — KETAMINE HCL IN NACL, ISO-OSM 100MG/10ML
SYRINGE (ML) INJECTION AS NEEDED
Status: DISCONTINUED | OUTPATIENT
Start: 2024-01-09 | End: 2024-01-09 | Stop reason: SURG

## 2024-01-09 RX ORDER — SODIUM CHLORIDE 0.9 % (FLUSH) 0.9 %
10 SYRINGE (ML) INJECTION AS NEEDED
Status: DISCONTINUED | OUTPATIENT
Start: 2024-01-09 | End: 2024-01-09 | Stop reason: HOSPADM

## 2024-01-09 RX ORDER — DIPHENHYDRAMINE HCL 25 MG
25 CAPSULE ORAL NIGHTLY PRN
COMMUNITY

## 2024-01-09 RX ORDER — PROMETHAZINE HYDROCHLORIDE 25 MG/1
25 SUPPOSITORY RECTAL ONCE AS NEEDED
Status: COMPLETED | OUTPATIENT
Start: 2024-01-09 | End: 2024-01-09

## 2024-01-09 RX ORDER — SODIUM CHLORIDE 9 MG/ML
40 INJECTION, SOLUTION INTRAVENOUS AS NEEDED
Status: DISCONTINUED | OUTPATIENT
Start: 2024-01-09 | End: 2024-01-09 | Stop reason: HOSPADM

## 2024-01-09 RX ORDER — ACETAMINOPHEN 500 MG
1000 TABLET ORAL ONCE
Status: COMPLETED | OUTPATIENT
Start: 2024-01-09 | End: 2024-01-09

## 2024-01-09 RX ORDER — BUPIVACAINE HYDROCHLORIDE 5 MG/ML
INJECTION, SOLUTION EPIDURAL; INTRACAUDAL AS NEEDED
Status: DISCONTINUED | OUTPATIENT
Start: 2024-01-09 | End: 2024-01-09 | Stop reason: HOSPADM

## 2024-01-09 RX ORDER — SODIUM CHLORIDE 0.9 % (FLUSH) 0.9 %
10 SYRINGE (ML) INJECTION EVERY 12 HOURS SCHEDULED
Status: DISCONTINUED | OUTPATIENT
Start: 2024-01-09 | End: 2024-01-09 | Stop reason: HOSPADM

## 2024-01-09 RX ORDER — ONDANSETRON 2 MG/ML
4 INJECTION INTRAMUSCULAR; INTRAVENOUS ONCE AS NEEDED
Status: DISCONTINUED | OUTPATIENT
Start: 2024-01-09 | End: 2024-01-09 | Stop reason: HOSPADM

## 2024-01-09 RX ORDER — SODIUM CHLORIDE, SODIUM LACTATE, POTASSIUM CHLORIDE, CALCIUM CHLORIDE 600; 310; 30; 20 MG/100ML; MG/100ML; MG/100ML; MG/100ML
9 INJECTION, SOLUTION INTRAVENOUS CONTINUOUS PRN
Status: DISCONTINUED | OUTPATIENT
Start: 2024-01-09 | End: 2024-01-09 | Stop reason: HOSPADM

## 2024-01-09 RX ORDER — CEFAZOLIN SODIUM IN 0.9 % NACL 3 G/100 ML
3000 INTRAVENOUS SOLUTION, PIGGYBACK (ML) INTRAVENOUS ONCE
Status: COMPLETED | OUTPATIENT
Start: 2024-01-09 | End: 2024-01-09

## 2024-01-09 RX ORDER — SODIUM CHLORIDE, SODIUM LACTATE, POTASSIUM CHLORIDE, CALCIUM CHLORIDE 600; 310; 30; 20 MG/100ML; MG/100ML; MG/100ML; MG/100ML
100 INJECTION, SOLUTION INTRAVENOUS CONTINUOUS
Status: DISCONTINUED | OUTPATIENT
Start: 2024-01-09 | End: 2024-01-09 | Stop reason: HOSPADM

## 2024-01-09 RX ORDER — HYDROCODONE BITARTRATE AND ACETAMINOPHEN 7.5; 325 MG/1; MG/1
1 TABLET ORAL EVERY 6 HOURS PRN
Qty: 30 TABLET | Refills: 0 | Status: SHIPPED | OUTPATIENT
Start: 2024-01-09

## 2024-01-09 RX ORDER — PROPOFOL 10 MG/ML
VIAL (ML) INTRAVENOUS AS NEEDED
Status: DISCONTINUED | OUTPATIENT
Start: 2024-01-09 | End: 2024-01-09 | Stop reason: SURG

## 2024-01-09 RX ORDER — FENTANYL CITRATE 50 UG/ML
INJECTION, SOLUTION INTRAMUSCULAR; INTRAVENOUS AS NEEDED
Status: DISCONTINUED | OUTPATIENT
Start: 2024-01-09 | End: 2024-01-09 | Stop reason: SURG

## 2024-01-09 RX ORDER — OXYCODONE HYDROCHLORIDE 5 MG/1
5 TABLET ORAL
Status: DISCONTINUED | OUTPATIENT
Start: 2024-01-09 | End: 2024-01-09 | Stop reason: HOSPADM

## 2024-01-09 RX ORDER — DEXMEDETOMIDINE HYDROCHLORIDE 100 UG/ML
INJECTION, SOLUTION INTRAVENOUS AS NEEDED
Status: DISCONTINUED | OUTPATIENT
Start: 2024-01-09 | End: 2024-01-09 | Stop reason: SURG

## 2024-01-09 RX ADMIN — FENTANYL CITRATE 25 MCG: 50 INJECTION, SOLUTION INTRAMUSCULAR; INTRAVENOUS at 07:44

## 2024-01-09 RX ADMIN — HYDROMORPHONE HYDROCHLORIDE 0.5 MG: 1 INJECTION, SOLUTION INTRAMUSCULAR; INTRAVENOUS; SUBCUTANEOUS at 09:01

## 2024-01-09 RX ADMIN — LIDOCAINE HYDROCHLORIDE 60 MG: 20 INJECTION, SOLUTION EPIDURAL; INFILTRATION; INTRACAUDAL; PERINEURAL at 07:32

## 2024-01-09 RX ADMIN — FENTANYL CITRATE 25 MCG: 50 INJECTION, SOLUTION INTRAMUSCULAR; INTRAVENOUS at 08:29

## 2024-01-09 RX ADMIN — KETOROLAC TROMETHAMINE 30 MG: 30 INJECTION, SOLUTION INTRAMUSCULAR; INTRAVENOUS at 07:41

## 2024-01-09 RX ADMIN — SODIUM CHLORIDE, POTASSIUM CHLORIDE, SODIUM LACTATE AND CALCIUM CHLORIDE 9 ML/HR: 600; 310; 30; 20 INJECTION, SOLUTION INTRAVENOUS at 06:53

## 2024-01-09 RX ADMIN — Medication 10 MG: at 07:41

## 2024-01-09 RX ADMIN — FENTANYL CITRATE 12.5 MCG: 50 INJECTION, SOLUTION INTRAMUSCULAR; INTRAVENOUS at 07:40

## 2024-01-09 RX ADMIN — Medication 2000 MG: at 07:35

## 2024-01-09 RX ADMIN — ACETAMINOPHEN 1000 MG: 500 TABLET ORAL at 06:53

## 2024-01-09 RX ADMIN — ONDANSETRON 4 MG: 2 INJECTION INTRAMUSCULAR; INTRAVENOUS at 08:35

## 2024-01-09 RX ADMIN — FENTANYL CITRATE 12.5 MCG: 50 INJECTION, SOLUTION INTRAMUSCULAR; INTRAVENOUS at 07:56

## 2024-01-09 RX ADMIN — DEXMEDETOMIDINE HYDROCHLORIDE 8 MCG: 100 INJECTION, SOLUTION, CONCENTRATE INTRAVENOUS at 08:29

## 2024-01-09 RX ADMIN — OXYCODONE HYDROCHLORIDE 5 MG: 5 TABLET ORAL at 09:33

## 2024-01-09 RX ADMIN — DEXMEDETOMIDINE HYDROCHLORIDE 12 MCG: 100 INJECTION, SOLUTION, CONCENTRATE INTRAVENOUS at 08:24

## 2024-01-09 RX ADMIN — PROPOFOL 50 MG: 10 INJECTION, EMULSION INTRAVENOUS at 07:33

## 2024-01-09 RX ADMIN — Medication 5 MG: at 08:26

## 2024-01-09 RX ADMIN — ONDANSETRON 4 MG: 2 INJECTION INTRAMUSCULAR; INTRAVENOUS at 07:36

## 2024-01-09 RX ADMIN — FENTANYL CITRATE 25 MCG: 50 INJECTION, SOLUTION INTRAMUSCULAR; INTRAVENOUS at 08:25

## 2024-01-09 RX ADMIN — Medication 10 MG: at 07:49

## 2024-01-09 RX ADMIN — DEXAMETHASONE SODIUM PHOSPHATE 8 MG: 4 INJECTION, SOLUTION INTRAMUSCULAR; INTRAVENOUS at 07:36

## 2024-01-09 RX ADMIN — PROMETHAZINE HYDROCHLORIDE 25 MG: 12.5 TABLET ORAL at 08:58

## 2024-01-09 RX ADMIN — MIDAZOLAM HYDROCHLORIDE 2 MG: 1 INJECTION, SOLUTION INTRAMUSCULAR; INTRAVENOUS at 07:15

## 2024-01-09 RX ADMIN — Medication 5 MG: at 08:15

## 2024-01-09 RX ADMIN — PROPOFOL 150 MG: 10 INJECTION, EMULSION INTRAVENOUS at 07:32

## 2024-01-09 NOTE — ANESTHESIA POSTPROCEDURE EVALUATION
Patient: Mina Romero    Procedure Summary       Date: 01/09/24 Room / Location: [facility] OR 04 / [facility] MAIN OR    Anesthesia Start: 0725 Anesthesia Stop: 0831    Procedure: RECESSION GASTROCNEMIUS (Right: Foot) Diagnosis:       Gastrocnemius equinus of right lower extremity      (Gastrocnemius equinus of right lower extremity [M62.461])    Surgeons: Grant Aiken DPM Provider: Twan Faith MD    Anesthesia Type: general with block ASA Status: 3            Anesthesia Type: general with block    Vitals  Vitals Value Taken Time   /93 01/09/24 0834   Temp 37.2 °C (99 °F) 01/09/24 0823   Pulse 81 01/09/24 0836   Resp 12 01/09/24 0825   SpO2 94 % 01/09/24 0836   Vitals shown include unfiled device data.        Post Anesthesia Care and Evaluation    Patient location during evaluation: bedside  Patient participation: complete - patient participated  Level of consciousness: awake  Pain management: adequate    Airway patency: patent  PONV Status: none  Cardiovascular status: acceptable and stable  Respiratory status: acceptable  Hydration status: acceptable    Comments: An Anesthesiologist personally participated in the most demanding procedures (including induction and emergence if applicable) in the anesthesia plan, monitored the course of anesthesia administration at frequent intervals and remained physically present and available for immediate diagnosis and treatment of emergencies.

## 2024-01-09 NOTE — OP NOTE
Operative Note   Foot and Ankle Surgery   Provider: Dr. Grant Aiken DPM  Location: [facility]    Patient Name:  Mina Romero  YOB: 1965    Date of Surgery:  1/9/2024    Pre-op Diagnosis:   Gastrocnemius equinus of right lower extremity [M62.461]       Post-Op Diagnosis Codes:     * Gastrocnemius equinus of right lower extremity [M62.461]    Procedure/CPT® Codes:  WI GASTROCNEMIUS RECESSION [56041]    Procedure(s):  RECESSION GASTROCNEMIUS        Staff:  Surgeon(s):  Grant Aiken DPM    Assistant: Delia Arreola RN CSA  was responsible for performing the following activities: Retraction, Suction, and Irrigation and their skilled assistance was necessary for the success of this case.    Anesthesia: General with Block    Estimated Blood Loss: less than 5 ml    Implants:    Nothing was implanted during the procedure    Specimen:          None    Findings: None    Complications: none    Description of Procedure:    Procedure, risks, complications, and goals were discussed with the patient at bedside.  Risks include but are not limited to infection, complications from anesthesia (including death), chronic pain or numbness, hematoma/seroma, deep vein thrombosis, wound complications, and potential for additional surgical procedures.  Patient understands and elects to proceed with surgery at this time. Informed consent was obtained before proceeding to the operating suite.  All questions were answered to the patient's satisfaction. No guarantees or assurances were given or implied.    Right foot was prepped and draped in usual sterile manner.  Right pneumatic thigh tourniquet was inflated to 300 mmHg.  Attention was directed to the medial aspect of the myotendinous junction of the gastroc tendon.  Incision was made medially deepened through subcutaneous tissue down to fascia the fascia was sharply incised.  All neurovascular structures were kept intact and retracted back.  Once  down to the subfascial layer the tendon was exposed and freed from the anterior muscle belly.  Using a curved Holt scissor the gastroc tendon was released.  Correction was noted in dorsiflexion was noted to the ankle.  Site was flushed with copious amounts saline deep fascia was closed using 3-0 Vicryl and subcutaneous tissue was closing 4-0 Vicryl.  Skin was closed using 3-0 nylon.  Patient was placed in a well-padded posterior splint.  Pneumatic thigh tourniquet was deflated and proper hyperemic response was noted to the right lower extremity.  Patient tolerated anesthesia procedure well.    Grant Aiken DPM  [facility]   [phone]    Grant Aiken DPM     Date: 1/9/2024  Time: 08:28 EST

## 2024-01-09 NOTE — ANESTHESIA PREPROCEDURE EVALUATION
Anesthesia Evaluation     Patient summary reviewed and Nursing notes reviewed   no history of anesthetic complications:   NPO Solid Status: > 8 hours  NPO Liquid Status: > 2 hours           Airway   Mallampati: II  TM distance: >3 FB  Neck ROM: full  Possible difficult intubation and Large neck circumference  Dental      Pulmonary - normal exam    breath sounds clear to auscultation  (+) a smoker Former, asthma,sleep apnea  Cardiovascular - normal exam  Exercise tolerance: poor (<4 METS)    Rhythm: regular  Rate: normal    (+) hypertension, dysrhythmias (s/p cardioversion last year, SR now) Paroxysmal Atrial Fib, hyperlipidemia      Neuro/Psych- negative ROS  GI/Hepatic/Renal/Endo    (+) morbid obesity    Musculoskeletal     Abdominal    Substance History      OB/GYN          Other   arthritis,     ROS/Med Hx Other: >4METS, HX AFIB,HTN,HLD. MAGDIEL CONVERSION 12/28/22. LAST CARDS OV 1/13/23 STABLE, F/U 6MTHS. PCP OV 11/23. NO FURTHER ISSUES. KT                Anesthesia Plan    ASA 3     general with block     (Patient understands anesthesia not responsible for dental damage. Regional anesthesia options discussed with patient. Pt accepts regional block.    Discussed possible block if requested)  intravenous induction     Anesthetic plan, risks, benefits, and alternatives have been provided, discussed and informed consent has been obtained with: patient.    Plan discussed with CRNA.    CODE STATUS:

## 2024-01-09 NOTE — DISCHARGE INSTRUCTIONS
DISCHARGE INSTRUCTIONS  ORTHOPEDICS      For your surgery you had:  General anesthesia (you may have a sore throat for the first 24 hours)  Local anesthesia    You may experience dizziness, drowsiness, or light-headedness for several hours following surgery  Do not stay alone today or tonight.  Limit your activity for 24 hours.  Resume your diet slowly.  Follow whatever special dietary instructions you may have been given by the doctor.  You should not drive or operate machinery or drink alcohol for 24 hours or while you are taking pain medication.  You should not sign any legally binding documents.  If you had an axillary or regional block, you will not have control of the involved limb for up to 12 hours.  Protect the arm with a sling or follow your physician's specific instructions.  You may remove dressing:  [] in 24 hours  [] in 48 hours  [x] Other: DO NOT REMOVE DRESSING; KEEP CLEAN, DRY AND INTACT   You may shower or bathe: WEDNESDAY; DO NOT GET DRESSING WET OR SUBMERGE RIGHT LEG   Sleep with the injured part elevated on a pillow.  Medications per physician's instructions as indicated on Discharge Medication Information Sheet.  Follow verbal instructions of your doctor.  Sit with the lower leg propped up on a footstool or chair with pillows.  Exercise toes for 10 minutes every hour while awake. Ice bag to injured area for 72 hours.  Apply 20 minutes on - 20 minutes off.  Never place ice directly on skin or cast.    Avoid getting cast or dressing wet.  In addition to these instructions, follow the discharge instructions on postoperative arthroscopic surgery.      SPECIAL INSTRUCTIONS:           Last dose of pain medication was given at:    NOTIFY THE PHYSICIAN IF YOU EXPERIENCE:  Numbness of toes.  Inability to move toes.  Extreme coldness, paleness or blue dis-coloration of toes.  Excessive swelling of affected surgical site or swelling that causes the cast to rub or cut into skin.  Pain unrelieved by pain  medication  Nausea/vomiting not relieved by prescribed medication  Unable to urinate in 6 hours after surgery  Temperature greater than 101 degree Fahrenheit or chills  If unable to reach your doctor, please go to the closest emergency room

## 2024-01-09 NOTE — TELEPHONE ENCOUNTER
Caller: Mina Wheat    Relationship: Self    Best call back number: [phone]    What form or medical record are you requesting: NOTE FOR WORK STATING PATIENT HAD SX 1.9.24 AND WILL BE OFF FOR TWO WEEKS    Who is requesting this form or medical record from you: PATIENT    How would you like to receive the form or medical records (pick-up, mail, fax):     ARVIN WHEAT (SPOUSE) WILL BE PICKING UP NOTE

## 2024-01-22 ENCOUNTER — OFFICE VISIT (OUTPATIENT)
Dept: PODIATRY | Facility: CLINIC | Age: 59
End: 2024-01-22
Payer: OTHER GOVERNMENT

## 2024-01-22 VITALS
TEMPERATURE: 98.2 F | HEART RATE: 88 BPM | DIASTOLIC BLOOD PRESSURE: 95 MMHG | BODY MASS INDEX: 42.66 KG/M2 | HEIGHT: 72 IN | WEIGHT: 315 LBS | SYSTOLIC BLOOD PRESSURE: 164 MMHG | OXYGEN SATURATION: 95 %

## 2024-01-22 DIAGNOSIS — M76.61 ACHILLES TENDINITIS OF RIGHT LOWER EXTREMITY: ICD-10-CM

## 2024-01-22 DIAGNOSIS — M77.51 POSTCALCANEAL BURSITIS OF RIGHT FOOT: Primary | ICD-10-CM

## 2024-01-22 DIAGNOSIS — M62.461 GASTROCNEMIUS EQUINUS OF RIGHT LOWER EXTREMITY: ICD-10-CM

## 2024-01-22 PROCEDURE — 99024 POSTOP FOLLOW-UP VISIT: CPT | Performed by: PODIATRIST

## 2024-01-23 NOTE — PROGRESS NOTES
[facility] - PODIATRY    Today's Date: 01/23/24    Patient Name: Mina Romero  MRN: [id number]  CSN: [id number]  PCP: Kemar Stroud DO,  Referring Provider: No ref. provider found    SUBJECTIVE     Chief Complaint   Patient presents with    Right Foot - Post-op Follow-up     HPI: Mina Romero, a 58 y.o.male, presents to clinic.    Patient is a 58-year-old male that states during March and April he was chasing his dog and he started having some discomfort in his right heel.  Patient went into a boot and then subsequently did physical therapy.  He states that it did improve but he still having trouble going up and down steps putting full weight on that foot.  Is  where the Achilles tendon inserts.    1/22/2024-patient is here for his postop appointment.  Overall doing well.  Minimal pain.  Past Medical History:   Diagnosis Date    A-fib     ASYMPTOMATIC- SEE'S DR SCHULTZ. DENIED CP/SOB    ADHD     Allergic rhinitis 03/05/2019    Allergies     Anxiety     Arthritis     Asthma     NO INHALERS    COVID-19 01/20/2021    Emotional depression     Head injury     X3 IN [place]- NO RESIDUAL    High blood pressure     High cholesterol     Sinus trouble      Past Surgical History:   Procedure Laterality Date    COLONOSCOPY      COLONOSCOPY N/A 10/14/2021    Procedure: COLONOSCOPY;  Surgeon: Riley Ryder MD;  Location: [facility];  Service: Gastroenterology;  Laterality: N/A;  DIVERTICULOSIS, HEMORRHOIDS    INGUINAL HERNIA REPAIR Bilateral     RECESSION GASTROCNEMIUS Right 1/9/2024    Procedure: RECESSION GASTROCNEMIUS;  Surgeon: Grant Aiken DPM;  Location: [facility] MAIN OR;  Service: Podiatry;  Laterality: Right;    SHOULDER SURGERY  1995    TONSILLECTOMY  1976     Family History   Problem Relation Age of Onset    Diabetes Mother     Stroke Father     Heart disease Father     Diabetes Father     Anxiety disorder Father     Depression Father     Heart disease Other          AUNT/UNCLE    Anxiety disorder Sister      Social History     Socioeconomic History    Marital status:    Tobacco Use    Smoking status: Former     Packs/day: 1.00     Years: 2.00     Additional pack years: 0.00     Total pack years: 2.00     Types: Cigarettes     Quit date:      Years since quittin.0    Smokeless tobacco: Never    Tobacco comments:     APPROX 20 YEARS AGO    Vaping Use    Vaping Use: Never used   Substance and Sexual Activity    Alcohol use: Never    Drug use: Never    Sexual activity: Defer     Allergies   Allergen Reactions    Latex Rash     Current Outpatient Medications   Medication Sig Dispense Refill    cetirizine (zyrTEC) 10 MG tablet 1 tablet.      diphenhydrAMINE (BENADRYL) 25 mg capsule Take 1 capsule by mouth At Night As Needed for Allergies.      DULoxetine (CYMBALTA) 60 MG capsule 1 capsule Daily.      fluticasone (FLONASE) 50 MCG/ACT nasal spray 2 sprays into the nostril(s) as directed by provider Every Night.      HYDROcodone-acetaminophen (Norco) 7.5-325 MG per tablet Take 1 tablet by mouth Every 6 (Six) Hours As Needed for Moderate Pain. (Patient not taking: Reported on 2024) 30 tablet 0    ibuprofen (ADVIL,MOTRIN) 800 MG tablet 0.5 tablets Every 6 (Six) Hours As Needed for Moderate Pain.      mirtazapine (REMERON) 30 MG tablet Take  by mouth. 0.5 tab qd      montelukast (SINGULAIR) 10 MG tablet Take 1 tablet by mouth At Night As Needed.      prazosin (MINIPRESS) 2 MG capsule Take 1 capsule by mouth Every Night.      promethazine (PHENERGAN) 25 MG tablet Take 1 tablet by mouth Every 6 (Six) Hours As Needed for Nausea or Vomiting. (Patient not taking: Reported on 2024) 20 tablet 0    rosuvastatin (CRESTOR) 10 MG tablet Take 1 tablet by mouth Every Night.      sildenafil (VIAGRA) 100 MG tablet Take 1 tablet by mouth As Needed.      urea (CARMOL) 20 % cream Apply as needed Twice daily 85 g 3     No current facility-administered medications for this visit.      Review of Systems   Musculoskeletal:         Right heel pain   All other systems reviewed and are negative.      OBJECTIVE     Vitals:    01/22/24 1454   BP: 164/95   Pulse: 88   Temp: 98.2 °F (36.8 °C)   SpO2: 95%       WBC   Date Value Ref Range Status   08/31/2022 6.59 3.40 - 10.80 10*3/mm3 Final     RBC   Date Value Ref Range Status   08/31/2022 5.17 4.14 - 5.80 10*6/mm3 Final     Hemoglobin   Date Value Ref Range Status   08/31/2022 15.3 13.0 - 17.7 g/dL Final     Hematocrit   Date Value Ref Range Status   08/31/2022 44.8 37.5 - 51.0 % Final     MCV   Date Value Ref Range Status   08/31/2022 86.7 79.0 - 97.0 fL Final     MCH   Date Value Ref Range Status   08/31/2022 29.6 26.6 - 33.0 pg Final     MCHC   Date Value Ref Range Status   08/31/2022 34.2 31.5 - 35.7 g/dL Final     RDW   Date Value Ref Range Status   08/31/2022 12.7 12.3 - 15.4 % Final     RDW-SD   Date Value Ref Range Status   08/31/2022 39.9 37.0 - 54.0 fl Final     MPV   Date Value Ref Range Status   08/31/2022 9.6 6.0 - 12.0 fL Final     Platelets   Date Value Ref Range Status   08/31/2022 227 140 - 450 10*3/mm3 Final     Neutrophil %   Date Value Ref Range Status   08/31/2022 63.5 42.7 - 76.0 % Final     Lymphocyte %   Date Value Ref Range Status   08/31/2022 25.9 19.6 - 45.3 % Final     Monocyte %   Date Value Ref Range Status   08/31/2022 8.6 5.0 - 12.0 % Final     Eosinophil %   Date Value Ref Range Status   08/31/2022 1.2 0.3 - 6.2 % Final     Basophil %   Date Value Ref Range Status   08/31/2022 0.5 0.0 - 1.5 % Final     Immature Grans %   Date Value Ref Range Status   08/31/2022 0.3 0.0 - 0.5 % Final     Neutrophils, Absolute   Date Value Ref Range Status   08/31/2022 4.18 1.70 - 7.00 10*3/mm3 Final     Lymphocytes, Absolute   Date Value Ref Range Status   08/31/2022 1.71 0.70 - 3.10 10*3/mm3 Final     Monocytes, Absolute   Date Value Ref Range Status   08/31/2022 0.57 0.10 - 0.90 10*3/mm3 Final     Eosinophils, Absolute   Date Value  Ref Range Status   08/31/2022 0.08 0.00 - 0.40 10*3/mm3 Final     Basophils, Absolute   Date Value Ref Range Status   08/31/2022 0.03 0.00 - 0.20 10*3/mm3 Final     Immature Grans, Absolute   Date Value Ref Range Status   08/31/2022 0.02 0.00 - 0.05 10*3/mm3 Final     nRBC   Date Value Ref Range Status   08/31/2022 0.0 0.0 - 0.2 /100 WBC Final         Lab Results   Component Value Date    GLUCOSE 73 08/31/2022    BUN 17 08/31/2022    CREATININE 1.21 08/31/2022    BCR 14.0 08/31/2022    K 4.2 08/31/2022    CO2 25.2 08/31/2022    CALCIUM 9.3 08/31/2022    ALBUMIN 4.10 08/31/2022    LABIL2 1.7 12/18/2019    AST 30 08/31/2022    ALT 31 08/31/2022       Patient seen in no apparent distress.      PHYSICAL EXAM:  Exam appropriate for postop course.  Incision well coapted.  No signs of dehiscence.  No erythema no malodor no drainage.  Calf nontender.  Sensation intact.  Palpable pulses, cap refill less than 3 seconds.  Normal muscle tone.    RADIOLOGY:          No results found.    ASSESSMENT/PLAN     Diagnoses and all orders for this visit:    1. Postcalcaneal bursitis of right foot (Primary)    2. Gastrocnemius equinus of right lower extremity    3. Achilles tendinitis of right lower extremity        Weightbearing as tolerated in the boot.    Patient to begin stretching exercises and icing in the evening as tolerated. Discussed compression therapy and resting the extremity.  Anti-inflammatory medication to begin taking if okay by PCP.      Comprehensive lower extremity examination and evaluation was performed.    Discussed findings and treatment plan including risks, benefits, and treatment options with patient in detail. Patient agreed with treatment plan.    Medications and allergies reviewed.  Reviewed available lab values along with other pertinent labs.  These were discussed with the patient.    An After Visit Summary was printed and given to the patient at discharge, including (if requested) any available  informative/educational handouts regarding diagnosis, treatment, or medications. All questions were answered to patient/family satisfaction. Should symptoms fail to improve or worsen they agree to call or return to clinic or to go to the Emergency Department. Discussed the importance of following up with any needed screening tests/labs/specialist appointments and any requested follow-up recommended by me today. Importance of maintaining follow-up discussed and patient accepts that missed appointments can delay diagnosis and potentially lead to worsening of conditions.    Return in about 1 month (around 2/22/2024)., or sooner if acute issues arise.    This document has been electronically signed by Grant Aiken DPM on January 23, 2024 07:32 EST

## 2024-02-14 ENCOUNTER — TELEPHONE (OUTPATIENT)
Dept: PODIATRY | Facility: CLINIC | Age: 59
End: 2024-02-14

## 2024-02-14 NOTE — TELEPHONE ENCOUNTER
Caller: Mina Romero    Relationship to patient: Self    Best call back number: [phone]    Chief complaint: RIGHT FOOT PAIN     Type of visit: FOLLOW UP     Requested date: THIS WEEK      If rescheduling, when is the original appointment: 02/21/2024

## 2024-02-19 ENCOUNTER — OFFICE VISIT (OUTPATIENT)
Dept: PODIATRY | Facility: CLINIC | Age: 59
End: 2024-02-19
Payer: OTHER GOVERNMENT

## 2024-02-19 VITALS
SYSTOLIC BLOOD PRESSURE: 145 MMHG | HEART RATE: 66 BPM | OXYGEN SATURATION: 96 % | BODY MASS INDEX: 42.66 KG/M2 | TEMPERATURE: 97.7 F | DIASTOLIC BLOOD PRESSURE: 81 MMHG | HEIGHT: 72 IN | WEIGHT: 315 LBS

## 2024-02-19 DIAGNOSIS — M77.51 POSTCALCANEAL BURSITIS OF RIGHT FOOT: ICD-10-CM

## 2024-02-19 DIAGNOSIS — M62.461 GASTROCNEMIUS EQUINUS OF RIGHT LOWER EXTREMITY: Primary | ICD-10-CM

## 2024-02-19 DIAGNOSIS — M76.61 ACHILLES TENDINITIS OF RIGHT LOWER EXTREMITY: ICD-10-CM

## 2024-02-19 PROCEDURE — 99024 POSTOP FOLLOW-UP VISIT: CPT | Performed by: PODIATRIST

## 2024-02-19 NOTE — PROGRESS NOTES
[facility] - PODIATRY    Today's Date: 02/19/24    Patient Name: Mina Romero  MRN: [id number]  CSN: [id number]  PCP: Kemar Stroud DO,  Referring Provider: No ref. provider found    SUBJECTIVE     No chief complaint on file.    HPI: Mina Romero, a 58 y.o.male, presents to clinic.    Patient is a 58-year-old male that states during March and April he was chasing his dog and he started having some discomfort in his right heel.  Patient went into a boot and then subsequently did physical therapy.  He states that it did improve but he still having trouble going up and down steps putting full weight on that foot.  Is  where the Achilles tendon inserts.    1/22/2024-patient is here for his postop appointment.  Overall doing well.  Minimal pain.    2/19/24 - Doing well here for post-op.   Past Medical History:   Diagnosis Date    A-fib     ASYMPTOMATIC- SEE'S DR SCHULTZ. DENIED CP/SOB    ADHD     Allergic rhinitis 03/05/2019    Allergies     Anxiety     Arthritis     Asthma     NO INHALERS    COVID-19 01/20/2021    Emotional depression     Head injury     X3 IN [place]- NO RESIDUAL    High blood pressure     High cholesterol     Sinus trouble      Past Surgical History:   Procedure Laterality Date    COLONOSCOPY      COLONOSCOPY N/A 10/14/2021    Procedure: COLONOSCOPY;  Surgeon: Riley Ryder MD;  Location: [facility] ENDOSCOPY;  Service: Gastroenterology;  Laterality: N/A;  DIVERTICULOSIS, HEMORRHOIDS    INGUINAL HERNIA REPAIR Bilateral     RECESSION GASTROCNEMIUS Right 1/9/2024    Procedure: RECESSION GASTROCNEMIUS;  Surgeon: Grant Aiken DPM;  Location: [facility] MAIN OR;  Service: Podiatry;  Laterality: Right;    SHOULDER SURGERY  1995    TONSILLECTOMY  1976     Family History   Problem Relation Age of Onset    Diabetes Mother     Stroke Father     Heart disease Father     Diabetes Father     Anxiety disorder Father     Depression Father     Heart disease Other          AUNT/UNCLE    Anxiety disorder Sister      Social History     Socioeconomic History    Marital status:    Tobacco Use    Smoking status: Former     Packs/day: 1.00     Years: 2.00     Additional pack years: 0.00     Total pack years: 2.00     Types: Cigarettes     Quit date:      Years since quittin.1    Smokeless tobacco: Never    Tobacco comments:     APPROX 20 YEARS AGO    Vaping Use    Vaping Use: Never used   Substance and Sexual Activity    Alcohol use: Never    Drug use: Never    Sexual activity: Defer     Allergies   Allergen Reactions    Latex Rash     Current Outpatient Medications   Medication Sig Dispense Refill    cetirizine (zyrTEC) 10 MG tablet 1 tablet.      diphenhydrAMINE (BENADRYL) 25 mg capsule Take 1 capsule by mouth At Night As Needed for Allergies.      DULoxetine (CYMBALTA) 60 MG capsule 1 capsule Daily.      fluticasone (FLONASE) 50 MCG/ACT nasal spray 2 sprays into the nostril(s) as directed by provider Every Night.      HYDROcodone-acetaminophen (Norco) 7.5-325 MG per tablet Take 1 tablet by mouth Every 6 (Six) Hours As Needed for Moderate Pain. (Patient not taking: Reported on 2024) 30 tablet 0    ibuprofen (ADVIL,MOTRIN) 800 MG tablet 0.5 tablets Every 6 (Six) Hours As Needed for Moderate Pain.      mirtazapine (REMERON) 30 MG tablet Take  by mouth. 0.5 tab qd      montelukast (SINGULAIR) 10 MG tablet Take 1 tablet by mouth At Night As Needed.      prazosin (MINIPRESS) 2 MG capsule Take 1 capsule by mouth Every Night.      promethazine (PHENERGAN) 25 MG tablet Take 1 tablet by mouth Every 6 (Six) Hours As Needed for Nausea or Vomiting. (Patient not taking: Reported on 2024) 20 tablet 0    rosuvastatin (CRESTOR) 10 MG tablet Take 1 tablet by mouth Every Night.      sildenafil (VIAGRA) 100 MG tablet Take 1 tablet by mouth As Needed.      urea (CARMOL) 20 % cream Apply as needed Twice daily 85 g 3     No current facility-administered medications for this visit.      Review of Systems   Musculoskeletal:         Right heel pain   All other systems reviewed and are negative.      OBJECTIVE     There were no vitals filed for this visit.      WBC   Date Value Ref Range Status   08/31/2022 6.59 3.40 - 10.80 10*3/mm3 Final     RBC   Date Value Ref Range Status   08/31/2022 5.17 4.14 - 5.80 10*6/mm3 Final     Hemoglobin   Date Value Ref Range Status   08/31/2022 15.3 13.0 - 17.7 g/dL Final     Hematocrit   Date Value Ref Range Status   08/31/2022 44.8 37.5 - 51.0 % Final     MCV   Date Value Ref Range Status   08/31/2022 86.7 79.0 - 97.0 fL Final     MCH   Date Value Ref Range Status   08/31/2022 29.6 26.6 - 33.0 pg Final     MCHC   Date Value Ref Range Status   08/31/2022 34.2 31.5 - 35.7 g/dL Final     RDW   Date Value Ref Range Status   08/31/2022 12.7 12.3 - 15.4 % Final     RDW-SD   Date Value Ref Range Status   08/31/2022 39.9 37.0 - 54.0 fl Final     MPV   Date Value Ref Range Status   08/31/2022 9.6 6.0 - 12.0 fL Final     Platelets   Date Value Ref Range Status   08/31/2022 227 140 - 450 10*3/mm3 Final     Neutrophil %   Date Value Ref Range Status   08/31/2022 63.5 42.7 - 76.0 % Final     Lymphocyte %   Date Value Ref Range Status   08/31/2022 25.9 19.6 - 45.3 % Final     Monocyte %   Date Value Ref Range Status   08/31/2022 8.6 5.0 - 12.0 % Final     Eosinophil %   Date Value Ref Range Status   08/31/2022 1.2 0.3 - 6.2 % Final     Basophil %   Date Value Ref Range Status   08/31/2022 0.5 0.0 - 1.5 % Final     Immature Grans %   Date Value Ref Range Status   08/31/2022 0.3 0.0 - 0.5 % Final     Neutrophils, Absolute   Date Value Ref Range Status   08/31/2022 4.18 1.70 - 7.00 10*3/mm3 Final     Lymphocytes, Absolute   Date Value Ref Range Status   08/31/2022 1.71 0.70 - 3.10 10*3/mm3 Final     Monocytes, Absolute   Date Value Ref Range Status   08/31/2022 0.57 0.10 - 0.90 10*3/mm3 Final     Eosinophils, Absolute   Date Value Ref Range Status   08/31/2022 0.08 0.00 - 0.40  10*3/mm3 Final     Basophils, Absolute   Date Value Ref Range Status   08/31/2022 0.03 0.00 - 0.20 10*3/mm3 Final     Immature Grans, Absolute   Date Value Ref Range Status   08/31/2022 0.02 0.00 - 0.05 10*3/mm3 Final     nRBC   Date Value Ref Range Status   08/31/2022 0.0 0.0 - 0.2 /100 WBC Final         Lab Results   Component Value Date    GLUCOSE 73 08/31/2022    BUN 17 08/31/2022    CREATININE 1.21 08/31/2022    BCR 14.0 08/31/2022    K 4.2 08/31/2022    CO2 25.2 08/31/2022    CALCIUM 9.3 08/31/2022    ALBUMIN 4.10 08/31/2022    LABIL2 1.7 12/18/2019    AST 30 08/31/2022    ALT 31 08/31/2022       Patient seen in no apparent distress.      PHYSICAL EXAM:  Exam appropriate for postop course.  Incision well coapted.  No signs of dehiscence.  No erythema no malodor no drainage.  Calf nontender.  Sensation intact.  Palpable pulses, cap refill less than 3 seconds.  Normal muscle tone.    RADIOLOGY:          No results found.    ASSESSMENT/PLAN     Diagnoses and all orders for this visit:    1. Gastrocnemius equinus of right lower extremity (Primary)    2. Achilles tendinitis of right lower extremity    3. Postcalcaneal bursitis of right foot        Weightbearing as tolerated     Patient to continue stretching exercises and icing in the evening as tolerated. Discussed compression therapy and resting the extremity.  Anti-inflammatory medication to begin taking if okay by PCP.      Comprehensive lower extremity examination and evaluation was performed.    Discussed findings and treatment plan including risks, benefits, and treatment options with patient in detail. Patient agreed with treatment plan.    Medications and allergies reviewed.  Reviewed available lab values along with other pertinent labs.  These were discussed with the patient.    An After Visit Summary was printed and given to the patient at discharge, including (if requested) any available informative/educational handouts regarding diagnosis, treatment,  or medications. All questions were answered to patient/family satisfaction. Should symptoms fail to improve or worsen they agree to call or return to clinic or to go to the Emergency Department. Discussed the importance of following up with any needed screening tests/labs/specialist appointments and any requested follow-up recommended by me today. Importance of maintaining follow-up discussed and patient accepts that missed appointments can delay diagnosis and potentially lead to worsening of conditions.    Return in about 3 months (around 5/19/2024)., or sooner if acute issues arise.    This document has been electronically signed by Grant Aiken DPM on February 19, 2024 07:34 EST

## 2024-03-18 ENCOUNTER — OFFICE VISIT (OUTPATIENT)
Dept: PODIATRY | Facility: CLINIC | Age: 59
End: 2024-03-18
Payer: OTHER GOVERNMENT

## 2024-03-18 VITALS
TEMPERATURE: 98 F | HEART RATE: 66 BPM | WEIGHT: 315 LBS | SYSTOLIC BLOOD PRESSURE: 133 MMHG | BODY MASS INDEX: 42.66 KG/M2 | OXYGEN SATURATION: 96 % | HEIGHT: 72 IN | DIASTOLIC BLOOD PRESSURE: 81 MMHG

## 2024-03-18 DIAGNOSIS — M62.461 GASTROCNEMIUS EQUINUS OF RIGHT LOWER EXTREMITY: Primary | ICD-10-CM

## 2024-03-18 DIAGNOSIS — M76.61 ACHILLES TENDINITIS OF RIGHT LOWER EXTREMITY: ICD-10-CM

## 2024-03-18 PROCEDURE — 99024 POSTOP FOLLOW-UP VISIT: CPT | Performed by: PODIATRIST

## 2024-03-19 NOTE — PROGRESS NOTES
[facility] - PODIATRY    Today's Date: 03/19/24    Patient Name: Mina Romero  MRN: [id number]  CSN: [id number]  PCP: Kemar Stroud DO,  Referring Provider: No ref. provider found    SUBJECTIVE     Chief Complaint   Patient presents with    Right Foot - Post-op Follow-up     Having tingling towards heel, wore boot until last week, felt better        HPI: Mina Romero, a 58 y.o.male, presents to clinic.    Patient is a 58-year-old male that states during March and April he was chasing his dog and he started having some discomfort in his right heel.  Patient went into a boot and then subsequently did physical therapy.  He states that it did improve but he still having trouble going up and down steps putting full weight on that foot.  Is  where the Achilles tendon inserts.    3/18/2024-patient states that he is improving.  States the back of his calf no longer hurts.  States some pain to the heel after standing for long periods of time.  Past Medical History:   Diagnosis Date    A-fib     ASYMPTOMATIC- SEE'S DR SCHULTZ. DENIED CP/SOB    ADHD     Allergic rhinitis 03/05/2019    Allergies     Anxiety     Arthritis     Asthma     NO INHALERS    COVID-19 01/20/2021    Emotional depression     Head injury     X3 IN [place]- NO RESIDUAL    High blood pressure     High cholesterol     Sinus trouble      Past Surgical History:   Procedure Laterality Date    COLONOSCOPY      COLONOSCOPY N/A 10/14/2021    Procedure: COLONOSCOPY;  Surgeon: Riley Ryder MD;  [facility];  Service: Gastroenterology;  Laterality: N/A;  DIVERTICULOSIS, HEMORRHOIDS    INGUINAL HERNIA REPAIR Bilateral     RECESSION GASTROCNEMIUS Right 1/9/2024    Procedure: RECESSION GASTROCNEMIUS;  Surgeon: Grant Aiken DPM;  Location: [facility];  Service: Podiatry;  Laterality: Right;    SHOULDER SURGERY  1995    TONSILLECTOMY  1976     Family History   Problem Relation Age of Onset    Diabetes Mother      Stroke Father     Heart disease Father     Diabetes Father     Anxiety disorder Father     Depression Father     Heart disease Other         AUNT/UNCLE    Anxiety disorder Sister      Social History     Socioeconomic History    Marital status:    Tobacco Use    Smoking status: Former     Current packs/day: 0.00     Average packs/day: 1 pack/day for 2.0 years (2.0 ttl pk-yrs)     Types: Cigarettes     Start date:      Quit date:      Years since quittin.2    Smokeless tobacco: Never    Tobacco comments:     APPROX 20 YEARS AGO    Vaping Use    Vaping status: Never Used   Substance and Sexual Activity    Alcohol use: Never    Drug use: Never    Sexual activity: Defer     Allergies   Allergen Reactions    Latex Rash     Current Outpatient Medications   Medication Sig Dispense Refill    cetirizine (zyrTEC) 10 MG tablet 1 tablet.      diphenhydrAMINE (BENADRYL) 25 mg capsule Take 1 capsule by mouth At Night As Needed for Allergies.      DULoxetine (CYMBALTA) 60 MG capsule 1 capsule Daily.      fluticasone (FLONASE) 50 MCG/ACT nasal spray 2 sprays into the nostril(s) as directed by provider Every Night.      ibuprofen (ADVIL,MOTRIN) 800 MG tablet 0.5 tablets Every 6 (Six) Hours As Needed for Moderate Pain.      mirtazapine (REMERON) 30 MG tablet Take  by mouth. 0.5 tab qd      montelukast (SINGULAIR) 10 MG tablet Take 1 tablet by mouth At Night As Needed.      prazosin (MINIPRESS) 2 MG capsule Take 1 capsule by mouth Every Night.      rosuvastatin (CRESTOR) 10 MG tablet Take 1 tablet by mouth Every Night.      sildenafil (VIAGRA) 100 MG tablet Take 1 tablet by mouth As Needed.      urea (CARMOL) 20 % cream Apply as needed Twice daily 85 g 3    HYDROcodone-acetaminophen (Norco) 7.5-325 MG per tablet Take 1 tablet by mouth Every 6 (Six) Hours As Needed for Moderate Pain. (Patient not taking: Reported on 2024) 30 tablet 0    promethazine (PHENERGAN) 25 MG tablet Take 1 tablet by mouth Every 6  (Six) Hours As Needed for Nausea or Vomiting. (Patient not taking: Reported on 1/22/2024) 20 tablet 0     No current facility-administered medications for this visit.     Review of Systems   Musculoskeletal:         Right heel pain   All other systems reviewed and are negative.      OBJECTIVE     Vitals:    03/18/24 1500   BP: 133/81   Pulse: 66   Temp: 98 °F (36.7 °C)   SpO2: 96%         WBC   Date Value Ref Range Status   08/31/2022 6.59 3.40 - 10.80 10*3/mm3 Final     RBC   Date Value Ref Range Status   08/31/2022 5.17 4.14 - 5.80 10*6/mm3 Final     Hemoglobin   Date Value Ref Range Status   08/31/2022 15.3 13.0 - 17.7 g/dL Final     Hematocrit   Date Value Ref Range Status   08/31/2022 44.8 37.5 - 51.0 % Final     MCV   Date Value Ref Range Status   08/31/2022 86.7 79.0 - 97.0 fL Final     MCH   Date Value Ref Range Status   08/31/2022 29.6 26.6 - 33.0 pg Final     MCHC   Date Value Ref Range Status   08/31/2022 34.2 31.5 - 35.7 g/dL Final     RDW   Date Value Ref Range Status   08/31/2022 12.7 12.3 - 15.4 % Final     RDW-SD   Date Value Ref Range Status   08/31/2022 39.9 37.0 - 54.0 fl Final     MPV   Date Value Ref Range Status   08/31/2022 9.6 6.0 - 12.0 fL Final     Platelets   Date Value Ref Range Status   08/31/2022 227 140 - 450 10*3/mm3 Final     Neutrophil %   Date Value Ref Range Status   08/31/2022 63.5 42.7 - 76.0 % Final     Lymphocyte %   Date Value Ref Range Status   08/31/2022 25.9 19.6 - 45.3 % Final     Monocyte %   Date Value Ref Range Status   08/31/2022 8.6 5.0 - 12.0 % Final     Eosinophil %   Date Value Ref Range Status   08/31/2022 1.2 0.3 - 6.2 % Final     Basophil %   Date Value Ref Range Status   08/31/2022 0.5 0.0 - 1.5 % Final     Immature Grans %   Date Value Ref Range Status   08/31/2022 0.3 0.0 - 0.5 % Final     Neutrophils, Absolute   Date Value Ref Range Status   08/31/2022 4.18 1.70 - 7.00 10*3/mm3 Final     Lymphocytes, Absolute   Date Value Ref Range Status   08/31/2022 1.71  0.70 - 3.10 10*3/mm3 Final     Monocytes, Absolute   Date Value Ref Range Status   08/31/2022 0.57 0.10 - 0.90 10*3/mm3 Final     Eosinophils, Absolute   Date Value Ref Range Status   08/31/2022 0.08 0.00 - 0.40 10*3/mm3 Final     Basophils, Absolute   Date Value Ref Range Status   08/31/2022 0.03 0.00 - 0.20 10*3/mm3 Final     Immature Grans, Absolute   Date Value Ref Range Status   08/31/2022 0.02 0.00 - 0.05 10*3/mm3 Final     nRBC   Date Value Ref Range Status   08/31/2022 0.0 0.0 - 0.2 /100 WBC Final         Lab Results   Component Value Date    GLUCOSE 73 08/31/2022    BUN 17 08/31/2022    CREATININE 1.21 08/31/2022    BCR 14.0 08/31/2022    K 4.2 08/31/2022    CO2 25.2 08/31/2022    CALCIUM 9.3 08/31/2022    ALBUMIN 4.10 08/31/2022    LABIL2 1.7 12/18/2019    AST 30 08/31/2022    ALT 31 08/31/2022       Patient seen in no apparent distress.      PHYSICAL EXAM:  Exam appropriate for postop course.  Incision well coapted.  No signs of dehiscence.  No erythema no malodor no drainage.  Calf nontender.  Sensation intact.  Palpable pulses, cap refill less than 3 seconds.  Normal muscle tone.    RADIOLOGY:          No results found.    ASSESSMENT/PLAN     Diagnoses and all orders for this visit:    1. Gastrocnemius equinus of right lower extremity (Primary)  -     Ambulatory Referral to Physical Therapy Evaluate and treat    2. Achilles tendinitis of right lower extremity        Weightbearing as tolerated     Patient to continue stretching exercises and icing in the evening as tolerated. Discussed compression therapy and resting the extremity.  Anti-inflammatory medication to begin taking if okay by PCP.    Patient to start physical therapy, order placed    Comprehensive lower extremity examination and evaluation was performed.    Discussed findings and treatment plan including risks, benefits, and treatment options with patient in detail. Patient agreed with treatment plan.    Medications and allergies reviewed.   Reviewed available lab values along with other pertinent labs.  These were discussed with the patient.    An After Visit Summary was printed and given to the patient at discharge, including (if requested) any available informative/educational handouts regarding diagnosis, treatment, or medications. All questions were answered to patient/family satisfaction. Should symptoms fail to improve or worsen they agree to call or return to clinic or to go to the Emergency Department. Discussed the importance of following up with any needed screening tests/labs/specialist appointments and any requested follow-up recommended by me today. Importance of maintaining follow-up discussed and patient accepts that missed appointments can delay diagnosis and potentially lead to worsening of conditions.    Return in about 1 month (around 4/18/2024)., or sooner if acute issues arise.    This document has been electronically signed by Grant Aiken DPM on March 19, 2024 07:14 EDT

## 2024-04-11 ENCOUNTER — TELEPHONE (OUTPATIENT)
Dept: PODIATRY | Facility: CLINIC | Age: 59
End: 2024-04-11
Payer: OTHER GOVERNMENT

## 2024-04-11 NOTE — TELEPHONE ENCOUNTER
Caller: ALTAF   Relationship to Patient: SELF  Phone Number: [phone] (home) [phone] (work)    Reason for Call: PATIENT CALLING STATING THAT AREN PHILLIPS FOR THE PHYSICAL THERAPY REFERRAL WAS NEVER SUBMITTED

## 2024-04-22 ENCOUNTER — TELEPHONE (OUTPATIENT)
Dept: FAMILY MEDICINE CLINIC | Facility: CLINIC | Age: 59
End: 2024-04-22

## 2024-07-09 ENCOUNTER — OFFICE VISIT (OUTPATIENT)
Dept: FAMILY MEDICINE CLINIC | Facility: CLINIC | Age: 59
End: 2024-07-09
Payer: OTHER GOVERNMENT

## 2024-07-09 VITALS
HEART RATE: 71 BPM | HEIGHT: 72 IN | TEMPERATURE: 98.2 F | SYSTOLIC BLOOD PRESSURE: 136 MMHG | WEIGHT: 315 LBS | DIASTOLIC BLOOD PRESSURE: 82 MMHG | OXYGEN SATURATION: 95 % | BODY MASS INDEX: 42.66 KG/M2
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
## 2024-07-09 DIAGNOSIS — S00.86XS: ICD-10-CM

## 2024-07-09 DIAGNOSIS — E78.2 MIXED HYPERLIPIDEMIA: ICD-10-CM

## 2024-07-09 DIAGNOSIS — F33.1 MODERATE EPISODE OF RECURRENT MAJOR DEPRESSIVE DISORDER: ICD-10-CM

## 2024-07-09 DIAGNOSIS — M23.52 CHRONIC INSTABILITY OF LEFT KNEE: ICD-10-CM

## 2024-07-09 DIAGNOSIS — L08.9: ICD-10-CM

## 2024-07-09 DIAGNOSIS — M62.461 GASTROCNEMIUS EQUINUS OF RIGHT LOWER EXTREMITY: Primary | ICD-10-CM

## 2024-07-09 DIAGNOSIS — M76.61 TENDONITIS, ACHILLES, RIGHT: ICD-10-CM

## 2024-07-09 DIAGNOSIS — Z51.81 MEDICATION MONITORING ENCOUNTER: ICD-10-CM

## 2024-07-09 PROCEDURE — 99214 OFFICE O/P EST MOD 30 MIN: CPT | Performed by: FAMILY MEDICINE

## 2024-07-09 NOTE — PROGRESS NOTES
Chief Complaint  Left knee instability  Right ankle pain      Subjective          Minastacie Romero presents to [facility]  History of Present Illness  Patient presents today to follow-up for right ankle pain.  He has been seeing podiatry and he was recommended to have physical therapy after surgery.  He had surgery back in January 2024.  This was with Dr. Aiken.  Patient had a gastrocnemius recession procedure done.  Again, he does need physical therapy but needs order to come through our office.  He was seen in urgent care for a bug bite.  He reports having a brown recluse bite on his left upper arm.  He was given Keflex.  This is now resolved.  He denies any more pain in this area.  Depression screening is positive at 13 points.  [facility] being followed by [facility].  He denies any suicidal ideations.  He does have hyperlipidemia.  He had labs done recently and reports that he will drop these off for my review.  He is currently taking Crestor 10 mg daily.  He reports that his good cholesterol is low and that his bad cholesterol was good.  Patient does have some issues with instability of his left knee.  This is particularly noted when going up steps.  He denies any pain.    Current Outpatient Medications   Medication Instructions    baclofen (LIORESAL) 10 mg, Oral, 3 Times Daily    cephalexin (KEFLEX) 500 mg, Oral, 3 Times Daily    cetirizine (ZYRTEC) 10 mg    diphenhydrAMINE (BENADRYL) 25 mg, Oral, Nightly PRN    DULoxetine (CYMBALTA) 60 mg, Daily    fluticasone (FLONASE) 50 MCG/ACT nasal spray 2 sprays, Nasal, Nightly    HYDROcodone-acetaminophen (Norco) 7.5-325 MG per tablet 1 tablet, Oral, Every 6 Hours PRN    ibuprofen (ADVIL,MOTRIN) 800 MG tablet 0.5 tablets Every 6 (Six) Hours As Needed for Moderate Pain.    mirtazapine (REMERON) 30 MG tablet Oral, 0.5 tab qd    montelukast (SINGULAIR) 10 mg, Oral, Nightly PRN    prazosin  "(MINIPRESS) 2 mg, Oral, Nightly    promethazine (PHENERGAN) 25 mg, Oral, Every 6 Hours PRN    risperiDONE (risperDAL) 4 MG tablet     rosuvastatin (CRESTOR) 10 mg, Oral, Nightly    sildenafil (VIAGRA) 100 mg, Oral, As Needed    urea (CARMOL) 20 % cream Apply as needed Twice daily       The following portions of the patient's history were reviewed and updated as appropriate: allergies, current medications, past family history, past medical history, past social history, past surgical history, and problem list.    Objective   Vital Signs:   /82 (BP Location: Left arm, Patient Position: Sitting)   Pulse 71   Temp 98.2 °F (36.8 °C) (Oral)   Ht 182.9 cm (72\")   Wt (!) 151 kg (333 lb 8 oz)   SpO2 95%   BMI 45.23 kg/m²     BP Readings from Last 3 Encounters:   07/09/24 136/82   06/22/24 158/91   03/18/24 133/81     Wt Readings from Last 3 Encounters:   07/09/24 (!) 151 kg (333 lb 8 oz)   06/22/24 (!) 149 kg (327 lb 6.4 oz)   03/18/24 (!) 155 kg (342 lb)           Physical Exam  Vitals reviewed.   Constitutional:       Appearance: Normal appearance.   HENT:      Head: Normocephalic and atraumatic.      Right Ear: External ear normal.      Left Ear: External ear normal.   Eyes:      Conjunctiva/sclera: Conjunctivae normal.   Cardiovascular:      Rate and Rhythm: Normal rate and regular rhythm.      Heart sounds: No murmur heard.     No friction rub. No gallop.   Pulmonary:      Effort: Pulmonary effort is normal.      Breath sounds: Normal breath sounds. No wheezing or rhonchi.   Abdominal:      General: Bowel sounds are normal. There is no distension.      Palpations: Abdomen is soft.      Tenderness: There is no abdominal tenderness.   Musculoskeletal:      Comments: Patient's left knee demonstrates negative valgus and varus stress testing with negative [place], negative anterior posterior drawer testing.  Positive single-leg declines squat.   Skin:     General: Skin is warm and dry.      Findings: No erythema. "   Neurological:      Mental Status: He is alert and oriented to person, place, and time.      Cranial Nerves: No cranial nerve deficit.   Psychiatric:         Mood and Affect: Mood and affect normal.         Behavior: Behavior normal.         Thought Content: Thought content normal.         Judgment: Judgment normal.              Result Review :   The following data was reviewed by: Kemar Stroud DO on 07/09/2024:           Lab Results   Component Value Date    SARSANTIGEN Not Detected 11/11/2022    COVID19 NOT DETECTED 10/28/2020    FLUAAG Not Detected 11/11/2022    FLUBAG Not Detected 11/11/2022       Procedures        Assessment and Plan    Diagnoses and all orders for this visit:    1. Gastrocnemius equinus of right lower extremity (Primary)  -     Ambulatory Referral to Physical Therapy    2. Tendonitis, Achilles, right    3. Nonvenomous insect bite of face with infection, sequela    4. Medication monitoring encounter    5. Chronic instability of left knee  -     Ambulatory Referral to Physical Therapy  -     XR Knee 3 View Left; Future    6. Moderate episode of recurrent major depressive disorder    7. Mixed hyperlipidemia      Patient is having some instability of his left knee.  I suspect this is due to weak quadriceps muscle.  I suspect due to altered gait that this may have affected instability issues in his left knee.  We did discuss physical therapy.  I will also order an x-ray of the left knee.  I discussed with him seeing him back for follow-up in 3 months after he is finished physical therapy.    There are no discontinued medications.       Follow Up   Return in about 3 months (around 10/9/2024) for left knee instability.  Patient was given instructions and counseling regarding his condition or for health maintenance advice. Please see specific information pulled into the AVS if appropriate.       Kemar Stroud DO  07/09/24  15:27 EDT

## 2024-08-08 ENCOUNTER — OFFICE VISIT (OUTPATIENT)
Dept: FAMILY MEDICINE CLINIC | Facility: CLINIC | Age: 59
End: 2024-08-08
Payer: OTHER GOVERNMENT

## 2024-08-08 VITALS
TEMPERATURE: 98.2 F | BODY MASS INDEX: 42.66 KG/M2 | WEIGHT: 315 LBS | SYSTOLIC BLOOD PRESSURE: 130 MMHG | DIASTOLIC BLOOD PRESSURE: 78 MMHG | HEART RATE: 86 BPM | OXYGEN SATURATION: 94 % | HEIGHT: 72 IN

## 2024-08-08 DIAGNOSIS — R00.0 TACHYCARDIA: ICD-10-CM

## 2024-08-08 DIAGNOSIS — L97.211 VENOUS STASIS ULCER OF RIGHT CALF LIMITED TO BREAKDOWN OF SKIN WITHOUT VARICOSE VEINS: ICD-10-CM

## 2024-08-08 DIAGNOSIS — B07.9 FILIFORM WART: ICD-10-CM

## 2024-08-08 DIAGNOSIS — R60.0 EDEMA OF RIGHT LOWER EXTREMITY: Primary | ICD-10-CM

## 2024-08-08 DIAGNOSIS — I87.2 VENOUS STASIS ULCER OF RIGHT CALF LIMITED TO BREAKDOWN OF SKIN WITHOUT VARICOSE VEINS: ICD-10-CM

## 2024-08-08 DIAGNOSIS — I48.0 PAROXYSMAL ATRIAL FIBRILLATION: ICD-10-CM

## 2024-08-08 PROCEDURE — 17110 DESTRUCTION B9 LES UP TO 14: CPT | Performed by: FAMILY MEDICINE

## 2024-08-08 PROCEDURE — 99214 OFFICE O/P EST MOD 30 MIN: CPT | Performed by: FAMILY MEDICINE

## 2024-08-08 NOTE — PROGRESS NOTES
Chief Complaint  Wound check  Right lower extremity swelling  Right skin lesion      Subjective          Mina GALLEGO Heather presents to [facility]  Wound Check    Follow-up    Patient presents today to discuss a skin lesion on his right forearm.  It has been present for several years.  We previously evaluated this back in December 2020.  The lesion at that time measured 12 x 8 mm and appeared consistent with seborrheic keratosis.  Lesion has persisted and even gotten slightly larger.  He is requesting evaluation today in his regard.  He has had issues with right ankle pain including a sore.  He previously had surgery and is doing physical therapy.  Surgery was back in 2024 and he had surgery with Dr. Adair.  Patient had a Gaster anemias recession procedure done.  Patient has had edema of the right lower extremity ever since he got back from a trip from [place] recently.  He has also had a sore on his right leg that is taking a long time to heal and was bleeding up until yesterday.  It is scabbed over now.  He does report previously being diagnosed with atrial fibrillation but had a cardioversion completed.  He reports that the episode of tachycardia lasted about an hour and a half.  He has not had any recurrence.  He is not having any symptoms today.  I did discuss with him assessing further with a Holter monitor study.    Current Outpatient Medications   Medication Instructions    baclofen (LIORESAL) 10 mg, Oral, 3 Times Daily    cetirizine (ZYRTEC) 10 mg    diphenhydrAMINE (BENADRYL) 25 mg, Oral, Nightly PRN    DULoxetine (CYMBALTA) 60 mg, Daily    fluticasone (FLONASE) 50 MCG/ACT nasal spray 2 sprays, Nasal, Nightly    HYDROcodone-acetaminophen (Norco) 7.5-325 MG per tablet 1 tablet, Oral, Every 6 Hours PRN    ibuprofen (ADVIL,MOTRIN) 800 MG tablet 0.5 tablets Every 6 (Six) Hours As Needed for Moderate Pain.    mirtazapine (REMERON) 30 MG tablet Oral, 0.5 tab qd    montelukast  "(SINGULAIR) 10 mg, Oral, Nightly PRN    prazosin (MINIPRESS) 2 mg, Oral, Nightly    promethazine (PHENERGAN) 25 mg, Oral, Every 6 Hours PRN    risperiDONE (risperDAL) 4 MG tablet     rosuvastatin (CRESTOR) 10 mg, Oral, Nightly    sildenafil (VIAGRA) 100 mg, Oral, As Needed    urea (CARMOL) 20 % cream Apply as needed Twice daily       The following portions of the patient's history were reviewed and updated as appropriate: allergies, current medications, past family history, past medical history, past social history, past surgical history, and problem list.    Objective   Vital Signs:   /78 (BP Location: Left arm, Patient Position: Sitting)   Pulse 86   Temp 98.2 °F (36.8 °C) (Oral)   Ht 182.9 cm (72\")   Wt (!) 149 kg (328 lb 6.4 oz)   SpO2 94%   BMI 44.54 kg/m²     BP Readings from Last 3 Encounters:   08/08/24 130/78   07/09/24 136/82   06/22/24 158/91     Wt Readings from Last 3 Encounters:   08/08/24 (!) 149 kg (328 lb 6.4 oz)   07/09/24 (!) 151 kg (333 lb 8 oz)   06/22/24 (!) 149 kg (327 lb 6.4 oz)           Physical Exam  Vitals reviewed.   Constitutional:       Appearance: Normal appearance.   HENT:      Head: Normocephalic and atraumatic.      Right Ear: External ear normal.      Left Ear: External ear normal.   Eyes:      Conjunctiva/sclera: Conjunctivae normal.   Cardiovascular:      Rate and Rhythm: Normal rate and regular rhythm.      Heart sounds: No murmur heard.     No friction rub. No gallop.   Pulmonary:      Effort: Pulmonary effort is normal.      Breath sounds: Normal breath sounds. No wheezing or rhonchi.   Abdominal:      General: Bowel sounds are normal. There is no distension.      Palpations: Abdomen is soft.      Tenderness: There is no abdominal tenderness.   Musculoskeletal:      Right lower leg: Edema present.   Skin:     Comments: Venous stasis ulcer noted of the right lower extremity on the medial aspect.  There is a scab in place.  Negative Homans test for the right lower " extremity.    Filiform wart noted on the right forearm measuring 14 x 9 mm.  Sharply demarcated.   Neurological:      Mental Status: He is alert and oriented to person, place, and time.      Cranial Nerves: No cranial nerve deficit.   Psychiatric:         Mood and Affect: Mood and affect normal.         Behavior: Behavior normal.         Thought Content: Thought content normal.         Judgment: Judgment normal.            Result Review :   The following data was reviewed by: Kemar Stroud DO on 08/08/2024:           Lab Results   Component Value Date    SARSANTIGEN Not Detected 11/11/2022    COVID19 NOT DETECTED 10/28/2020    FLUAAG Not Detected 11/11/2022    FLUBAG Not Detected 11/11/2022       Cryotherapy, Skin Lesion    Date/Time: 8/8/2024 1:30 PM    Performed by: Kemar Stroud DO  Authorized by: Kemar Stroud DO  Patient tolerance: patient tolerated the procedure well with no immediate complications  Comments: Patient provided verbal consent for treatment.  A filiform wart was treated on the dorsal aspect of the right forearm.              Assessment and Plan    Diagnoses and all orders for this visit:    1. Edema of right lower extremity (Primary)  -     Duplex Venous Upper Extremity - Right CAR; Future    2. Venous stasis ulcer of right calf limited to breakdown of skin without varicose veins    3. Paroxysmal atrial fibrillation  -     Holter monitor - 48 hour; Future    4. Tachycardia  -     Holter monitor - 48 hour; Future    5. Filiform wart  -     Cryotherapy, Skin Lesion    Given edema have discussed with patient getting a right lower extremity Doppler for further evaluation.  I did discuss with patient treatment for venous stasis ulcer.  Silvadene to be applied and wound to be dressed today.  I plan to see him back in 1 month for close follow-up.  I did discuss with patient getting a Holter monitor study completed.  His cardiac exam today is reassuring.  I did discuss with him post  procedure instructions for the filiform wart.  Should he have any recurrence or concerns he is instructed to let us know.      Medications Discontinued During This Encounter   Medication Reason    cephalexin (KEFLEX) 500 MG capsule *Therapy completed          Follow Up   Return in about 1 month (around 9/8/2024) for venous stasis ulcer.  Patient was given instructions and counseling regarding his condition or for health maintenance advice. Please see specific information pulled into the AVS if appropriate.       Kemar Stroud DO  08/08/24  13:35 EDT

## 2024-08-09 ENCOUNTER — HOSPITAL ENCOUNTER (OUTPATIENT)
Dept: CARDIOLOGY | Facility: HOSPITAL | Age: 59
Discharge: HOME | End: 2024-08-09
Admitting: FAMILY MEDICINE
Payer: OTHER GOVERNMENT

## 2024-08-09 DIAGNOSIS — R60.0 EDEMA OF RIGHT LOWER EXTREMITY: ICD-10-CM

## 2024-08-09 LAB
BH CV LOWER VASCULAR LEFT COMMON FEMORAL AUGMENT: NORMAL
BH CV LOWER VASCULAR LEFT COMMON FEMORAL COMPETENT: NORMAL
BH CV LOWER VASCULAR LEFT COMMON FEMORAL COMPRESS: NORMAL
BH CV LOWER VASCULAR LEFT COMMON FEMORAL PHASIC: NORMAL
BH CV LOWER VASCULAR LEFT COMMON FEMORAL SPONT: NORMAL
BH CV LOWER VASCULAR RIGHT COMMON FEMORAL AUGMENT: NORMAL
BH CV LOWER VASCULAR RIGHT COMMON FEMORAL COMPETENT: NORMAL
BH CV LOWER VASCULAR RIGHT COMMON FEMORAL COMPRESS: NORMAL
BH CV LOWER VASCULAR RIGHT COMMON FEMORAL PHASIC: NORMAL
BH CV LOWER VASCULAR RIGHT COMMON FEMORAL SPONT: NORMAL
BH CV LOWER VASCULAR RIGHT DISTAL FEMORAL COMPRESS: NORMAL
BH CV LOWER VASCULAR RIGHT GASTRONEMIUS COMPRESS: NORMAL
BH CV LOWER VASCULAR RIGHT GREATER SAPH AK COMPRESS: NORMAL
BH CV LOWER VASCULAR RIGHT GREATER SAPH BK COMPRESS: NORMAL
BH CV LOWER VASCULAR RIGHT LESSER SAPH COMPRESS: NORMAL
BH CV LOWER VASCULAR RIGHT MID FEMORAL AUGMENT: NORMAL
BH CV LOWER VASCULAR RIGHT MID FEMORAL COMPETENT: NORMAL
BH CV LOWER VASCULAR RIGHT MID FEMORAL COMPRESS: NORMAL
BH CV LOWER VASCULAR RIGHT MID FEMORAL PHASIC: NORMAL
BH CV LOWER VASCULAR RIGHT MID FEMORAL SPONT: NORMAL
BH CV LOWER VASCULAR RIGHT PERONEAL COMPRESS: NORMAL
BH CV LOWER VASCULAR RIGHT POPLITEAL AUGMENT: NORMAL
BH CV LOWER VASCULAR RIGHT POPLITEAL COMPETENT: NORMAL
BH CV LOWER VASCULAR RIGHT POPLITEAL COMPRESS: NORMAL
BH CV LOWER VASCULAR RIGHT POPLITEAL PHASIC: NORMAL
BH CV LOWER VASCULAR RIGHT POPLITEAL SPONT: NORMAL
BH CV LOWER VASCULAR RIGHT POSTERIOR TIBIAL COMPRESS: NORMAL
BH CV LOWER VASCULAR RIGHT PROXIMAL FEMORAL COMPRESS: NORMAL
BH CV LOWER VASCULAR RIGHT SAPHENOFEMORAL JUNCTION COMPRESS: NORMAL

## 2024-08-09 PROCEDURE — 93971 EXTREMITY STUDY: CPT

## 2024-09-04 ENCOUNTER — TREATMENT (OUTPATIENT)
Dept: PHYSICAL THERAPY | Facility: CLINIC | Age: 59
End: 2024-09-04
Payer: OTHER GOVERNMENT

## 2024-09-04 DIAGNOSIS — M62.461 GASTROCNEMIUS EQUINUS OF RIGHT LOWER EXTREMITY: Primary | ICD-10-CM

## 2024-09-04 DIAGNOSIS — M23.52 CHRONIC INSTABILITY OF KNEE, LEFT KNEE: ICD-10-CM

## 2024-09-04 NOTE — PROGRESS NOTES
" Physical Therapy Initial Evaluation and Plan of Care  [address] [facility] [address], [place] [address]      Patient: Mina Romero   : 1965  Diagnosis/ICD-10 Code:  Gastrocnemius equinus of right lower extremity [M62.461]  Referring practitioner: Kemar Stroud DO  Date of Initial Visit: 2024  Today's Date: 2024  Patient seen for 1 sessions           Subjective Questionnaire: LEFS: 35/80=56% limitation      Subjective Evaluation    History of Present Illness  Mechanism of injury: Patient is a 59 yr old male referred to physical therapy with diagnosis of left knee instability and right ankle pain. Patient underwent right gastrocnemius recession procedure in 2024.  Patient reports right ankle still stiff and would like to regain mobility. Patient states has performing left knee strengthening exercises its helping, but still needing some knee strengthening. Patient states left knee \"gives out\" with stairs. Patient reports greatest difficulty with negotiating stairs. Patient goes up/down stairs sideways/laterally.     Pain  No pain reported    Patient Goals  Patient goals for therapy: increased motion, increased strength and independence with ADLs/IADLs  Patient goal: to negotiate up/down steps         Objective          Active Range of Motion   Left Knee   Normal active range of motion    Right Ankle/Foot   Dorsiflexion (ke): 10 degrees   Plantar flexion: 35 degrees   Inversion: WFL  Eversion: 10 degrees     Passive Range of Motion     Right Ankle/Foot    Dorsiflexion (ke): 15 degrees   Inversion: WFL  Eversion: WFL    Strength/Myotome Testing     Left Knee   Flexion: 4  Extension: 4    Right Ankle/Foot   Dorsiflexion: 3  Plantar flexion: 3+  Inversion: 3  Eversion: 3    Left Knee Flexibility Comments:   Tight quad/hamstrings and hip flexor        Assessment & Plan       Assessment  Impairments: abnormal gait, abnormal or restricted ROM, activity intolerance, impaired physical strength and lacks " appropriate home exercise program   Other impairment: decrease stair negotiation  Functional limitations: walking, uncomfortable because of pain, stooping and unable to perform repetitive tasks   Assessment details: Pt presents with limitations, noted by evaluation that impede patient's ability to negotiate stairs/tolerance to functional mobility.  The skills of a therapist will be required to safely and effectively implement the following treatment plan to restore maximal level of function.      Prognosis: good    Goals  Plan Goals: 1. The patient has limited ROM for the right ankle.   LTG 1: 12 weeks:  In order to allow the patient greater ease with forward, lateral, and diagonal mobility the patient will demonstrate improved ROM of the right ankle as follows:  20 degrees of dorsiflexion and 40 degrees of plantarflexion,   STATUS:  New   STG 1a: 6 weeks:  The patient will demonstrate improved ROM of the right ankle as follows:  15 degrees of dorsiflexion,    STATUS:  New      2. The patient has limited strength of the right ankle.   LTG 2: 12 weeks:  In order to provide greater joint stability of the right ankle the patient will demonstrate improved strength of the right ankle as follows:  4+/5 dorsiflexion, 4+/5 inversion, 4+/5 eversion, and 4+/5 plantar flexion.   STATUS:  New   STG 2a: 6 weeks:  The patient will demonstrate improved strength of the right ankle as follows:  4/5 dorsiflexion, 4/5 inversion, 4/5 eversion, and 4/5 plantar flexion.   STATUS:  New       3. Mobility: Walking/Moving Around Functional Limitation     LTG 3: 12 weeks:  The patient will demonstrate 25% limitation by achieving a score of 60/80 on the Lower Extremity Functional Scale.   STATUS:  New   STG 3a: 6 weeks:  The patient will demonstrate 37% limitation by achieving a score of 50/80 on the Lower Extremity Functional Scale.     STATUS:  New         Plan  Therapy options: will be seen for skilled therapy services  Planned modality  interventions: cryotherapy and thermotherapy (hydrocollator packs)  Planned therapy interventions: manual therapy, soft tissue mobilization, strengthening, stretching, therapeutic activities, joint mobilization, home exercise program, gait training, functional ROM exercises, flexibility and balance/weight-bearing training  Frequency: 2x week  Duration in weeks: 12  Treatment plan discussed with: patient  History # of Personal Factors and/or Comorbidities: MODERATE (1-2)  Examination of Body System(s): # of elements: LOW (1-2)  Clinical Presentation: STABLE   Clinical Decision Making: LOW       Visit Diagnoses:    ICD-10-CM ICD-9-CM   1. Gastrocnemius equinus of right lower extremity  M62.461 728.85   2. Chronic instability of knee, left knee  M23.52 718.86       Timed:  Manual Therapy:         mins  49139;  Therapeutic Exercise:    18     mins  47281;     Neuromuscular Vlad:        mins  27444;    Therapeutic Activity:     8     mins  32177;     Gait Training:           mins  86764;     Ultrasound:          mins  84413;    Electrical Stimulation:         mins  56231 ( );    Untimed:  Electrical Stimulation:         mins  68734 ( );  Mechanical Traction:         mins  99129;    PT evaluation     Low Eval                        25   Mins  19162  Mod Eval                             Mins  54972  High Eval                           Mins  39016    Timed Treatment:   26   mins   Total Treatment:     51   mins    PT SIGNATURE: Ledy Kinney PT     Electronically singed 9/4/2024    [place] PT license: [id number]        Initial Certification  Certification Period: 9/4/2024 thru 12/2/2024  I certify that the therapy services are furnished while this patient is under my care.  The services outlined above are required by this patient, and will be reviewed every 90 days.    PHYSICIAN: Kemar Stroud DO  NPI: [id number]                                      DATE:     Please sign and return via fax to [phone]   Thank you, [facility].

## 2024-09-05 ENCOUNTER — OFFICE VISIT (OUTPATIENT)
Dept: FAMILY MEDICINE CLINIC | Facility: CLINIC | Age: 59
End: 2024-09-05
Payer: OTHER GOVERNMENT

## 2024-09-05 VITALS
HEIGHT: 72 IN | OXYGEN SATURATION: 98 % | WEIGHT: 315 LBS | BODY MASS INDEX: 42.66 KG/M2 | TEMPERATURE: 98.5 F | SYSTOLIC BLOOD PRESSURE: 118 MMHG | DIASTOLIC BLOOD PRESSURE: 66 MMHG | HEART RATE: 79 BPM

## 2024-09-05 DIAGNOSIS — B07.9 FILIFORM WART: ICD-10-CM

## 2024-09-05 DIAGNOSIS — M23.52 CHRONIC INSTABILITY OF LEFT KNEE: ICD-10-CM

## 2024-09-05 DIAGNOSIS — I87.2 VENOUS STASIS ULCER OF RIGHT CALF LIMITED TO BREAKDOWN OF SKIN WITHOUT VARICOSE VEINS: ICD-10-CM

## 2024-09-05 DIAGNOSIS — L97.211 VENOUS STASIS ULCER OF RIGHT CALF LIMITED TO BREAKDOWN OF SKIN WITHOUT VARICOSE VEINS: ICD-10-CM

## 2024-09-05 DIAGNOSIS — I48.0 PAROXYSMAL ATRIAL FIBRILLATION: Primary | ICD-10-CM

## 2024-09-05 DIAGNOSIS — R00.0 TACHYCARDIA: ICD-10-CM

## 2024-09-05 PROCEDURE — 99214 OFFICE O/P EST MOD 30 MIN: CPT | Performed by: FAMILY MEDICINE

## 2024-09-05 PROCEDURE — 93000 ELECTROCARDIOGRAM COMPLETE: CPT | Performed by: FAMILY MEDICINE

## 2024-09-05 RX ORDER — FUROSEMIDE 20 MG
20 TABLET ORAL DAILY PRN
Qty: 30 TABLET | Refills: 0 | Status: SHIPPED | OUTPATIENT
Start: 2024-09-05

## 2024-09-05 RX ORDER — ASPIRIN 81 MG/1
81 TABLET ORAL DAILY
COMMUNITY

## 2024-09-05 NOTE — PROGRESS NOTES
Chief Complaint  Atrial fibrillation  The wart  Venous stasis ulcer    Subjective          Mina Romero presents to [facility]  Follow-up  Atrial Fibrillation  Past medical history includes atrial fibrillation.     Patient presents today to discuss atrial fibrillation.  He previously had a cardioversion completed back in 2022, December.  He was seen by Dr. Christine.  He reports that he had episode of tachycardia last about an hour and a half and then went away.  Since last time I seen him though he has had several instances where his wife is telling him that he is in atrial fibrillation.  I did order Holter monitor study on the last visit and is scheduled now for 9/20/2024.  I did discuss with him getting an EKG today.  He is requesting see cardiology again.  He is not currently anticoagulated.  He had a wart on his right forearm that was treated with liquid nitrogen.  This wart has now resolved.  He also had a venous stasis ulcer on the right lower extremity on the medial aspect.  Patient was given Silvadene which helped out.  I did discuss with him using Lasix to help out with lower extremity edema periodically.  I did discuss with him weight loss as well.  He did have a Doppler done to evaluate for DVT which was negative.      Current Outpatient Medications   Medication Instructions    aspirin 81 mg, Oral, Daily    baclofen (LIORESAL) 10 mg, Oral, 3 Times Daily    cetirizine (ZYRTEC) 10 mg    diphenhydrAMINE (BENADRYL) 25 mg, Oral, Nightly PRN    DULoxetine (CYMBALTA) 60 mg, Daily    fluticasone (FLONASE) 50 MCG/ACT nasal spray 2 sprays, Nasal, Nightly    furosemide (LASIX) 20 mg, Oral, Daily PRN    HYDROcodone-acetaminophen (Norco) 7.5-325 MG per tablet 1 tablet, Oral, Every 6 Hours PRN    ibuprofen (ADVIL,MOTRIN) 800 MG tablet 0.5 tablets Every 6 (Six) Hours As Needed for Moderate Pain.    mirtazapine (REMERON) 30 MG tablet Oral, 0.5 tab qd    montelukast (SINGULAIR) 10 mg,  "Oral, Nightly PRN    prazosin (MINIPRESS) 2 mg, Oral, Nightly    promethazine (PHENERGAN) 25 mg, Oral, Every 6 Hours PRN    risperiDONE (risperDAL) 4 MG tablet     rosuvastatin (CRESTOR) 10 mg, Oral, Nightly    sildenafil (VIAGRA) 100 mg, Oral, As Needed    urea (CARMOL) 20 % cream Apply as needed Twice daily       The following portions of the patient's history were reviewed and updated as appropriate: allergies, current medications, past family history, past medical history, past social history, past surgical history, and problem list.    Objective   Vital Signs:   /66   Pulse 79   Temp 98.5 °F (36.9 °C)   Ht 182.9 cm (72\")   Wt (!) 147 kg (323 lb 11.2 oz)   SpO2 98%   BMI 43.90 kg/m²     BP Readings from Last 3 Encounters:   09/05/24 118/66   08/08/24 130/78   07/09/24 136/82     Wt Readings from Last 3 Encounters:   09/05/24 (!) 147 kg (323 lb 11.2 oz)   08/08/24 (!) 149 kg (328 lb 6.4 oz)   07/09/24 (!) 151 kg (333 lb 8 oz)           Physical Exam  Vitals reviewed.   Constitutional:       Appearance: Normal appearance.   HENT:      Head: Normocephalic and atraumatic.      Right Ear: External ear normal.      Left Ear: External ear normal.   Eyes:      Conjunctiva/sclera: Conjunctivae normal.   Cardiovascular:      Rate and Rhythm: Normal rate. Rhythm irregular.      Heart sounds: No murmur heard.     No friction rub. No gallop.   Pulmonary:      Effort: Pulmonary effort is normal.      Breath sounds: Normal breath sounds. No wheezing or rhonchi.   Abdominal:      General: Bowel sounds are normal. There is no distension.      Palpations: Abdomen is soft.      Tenderness: There is no abdominal tenderness.   Skin:     Comments: Resolved venous stasis ulcer noted in right lower extremity.  Results wart noted on his right forearm.   Neurological:      Mental Status: He is alert and oriented to person, place, and time.      Cranial Nerves: No cranial nerve deficit.   Psychiatric:         Mood and Affect: " Mood and affect normal.         Behavior: Behavior normal.         Thought Content: Thought content normal.         Judgment: Judgment normal.            Result Review :   The following data was reviewed by: Kemar Stroud DO on 09/05/2024:           Lab Results   Component Value Date    SARSANTIGEN Not Detected 11/11/2022    COVID19 NOT DETECTED 10/28/2020    FLUAAG Not Detected 11/11/2022    FLUBAG Not Detected 11/11/2022         ECG 12 Lead    Date/Time: 9/5/2024 12:44 PM  Performed by: Kemar Stroud DO    Authorized by: Kemar Stroud DO  Comparison: compared with previous ECG from 12/28/2022  Rhythm: atrial fibrillation    Clinical impression: abnormal EKG  Comments: EKG demonstrates atrial fibrillation with no discernible P waves.  There is no acute ST or T wave changes.  There does appear to be borderline left axis deviation.  QRS duration is normal at 92 ms with QTc interval being normal at 430 ms.              Assessment and Plan    Diagnoses and all orders for this visit:    1. Paroxysmal atrial fibrillation (Primary)  -     Ambulatory Referral to Cardiology    2. Venous stasis ulcer of right calf limited to breakdown of skin without varicose veins    3. Filiform wart    4. Tachycardia  -     ECG 12 Lead  -     Ambulatory Referral to Cardiology    5. Chronic instability of left knee    Other orders  -     furosemide (Lasix) 20 MG tablet; Take 1 tablet by mouth Daily As Needed (lower extremity edema).  Dispense: 30 tablet; Refill: 0      I discussed with patient atrial fibrillation which is noted today.  Plan to get a Holter monitor study completed for further evaluation.  Referral will be made back to cardiology.  He was encouraged to follow-up again should he have recurrence of atrial fibrillation.  I did discuss with him his [id number] score.  His score is 0.  Hypertension is listed in his history however he is not taking any medication for this.  Otherwise he would have a score of 1.  I  did discuss with him taking a low-dose aspirin daily.  He was previously on Eliquis but we will hold off at this time.  I will have him to be evaluated by cardiology.  He will consider options including cardioversion versus considering an ablation.  Patient will continue physical therapy for his left knee as well.  Treatment and management plan for venous stasis ulcer has been discussed with the patient.  He had improvement with Silvadene.  He will continue as needed.    There are no discontinued medications.       Follow Up   Return in about 3 months (around 12/5/2024) for atrial fibrillation.  Patient was given instructions and counseling regarding his condition or for health maintenance advice. Please see specific information pulled into the AVS if appropriate.       Kemar Stroud DO  09/05/24  13:01 EDT

## 2024-09-11 ENCOUNTER — TREATMENT (OUTPATIENT)
Dept: PHYSICAL THERAPY | Facility: CLINIC | Age: 59
End: 2024-09-11
Payer: OTHER GOVERNMENT

## 2024-09-11 DIAGNOSIS — M62.461 GASTROCNEMIUS EQUINUS OF RIGHT LOWER EXTREMITY: Primary | ICD-10-CM

## 2024-09-11 DIAGNOSIS — M23.52 CHRONIC INSTABILITY OF KNEE, LEFT KNEE: ICD-10-CM

## 2024-09-11 NOTE — PROGRESS NOTES
Physical Therapy Daily Treatment Note  [address] [place] [address]        Patient: Mina Romero   : 1965  Diagnosis/ICD-10 Code:  Gastrocnemius equinus of right lower extremity [M62.461]  Referring practitioner: Kemar Stroud DO  Date of Initial Visit: Type: THERAPY  Noted: 2024  Today's Date: 2024  Patient seen for 2 sessions             Subjective   Mina Romero reports having 4/10 pain in his right heel and his left knee upon arrival today.      Objective       Gt Observation:  + Decreased Push off and Metatarsal Break on Right with ambulation    See Exercise and Manual Logs for complete treatment.       Assessment/Plan    Pt tolerated therapy session  well today  with progression of therapeutic exercises, CKC-Functional activities, and Manual therapy. He has improved, but continues to have deficits in His Right Ankle/Foot  and his Left Knee ROM and   Strength; limiting functional mobility and ability to perform all ADLs without increased pain and difficulty at this time.  Positive response to trial of Manual stretching and Mobilizations for Right Foot-ankle; as evidenced by pt reporting decrease in  tightness post session.  Pt exhibits decreased eccentric strength and control of bilateral knees and Right ankle - as noted during functional activity performance in clinic.        Progress per Plan of Care               Timed:  Manual Therapy:    8     mins  22609;  Therapeutic Exercise:    22     mins  27786;     Neuromuscular Vlad:    0    mins  90970;    Therapeutic Activity:     15     mins  54771;     Gait Trainin     mins  92867;     Ultrasound:     0     mins  50862;    Electrical Stimulation:    0     mins  60773;  Iontophoresis     0     mins  90242    Untimed:  Electrical Stimulation:    0     mins  53646 ( );  Mechanical Traction:    0     mins  62487;   Fluidotherapy     0     mins  52559  Hot pack     0     mins  58785  Cold pack     0     mins   08995    Timed Treatment:   45   mins   Total Treatment:     45   mins        Nanda Rodríguez PTA  Physical Therapist Assistant

## 2024-09-12 ENCOUNTER — TELEPHONE (OUTPATIENT)
Dept: CARDIOLOGY | Facility: CLINIC | Age: 59
End: 2024-09-12
Payer: OTHER GOVERNMENT

## 2024-09-12 ENCOUNTER — OFFICE VISIT (OUTPATIENT)
Dept: CARDIOLOGY | Facility: CLINIC | Age: 59
End: 2024-09-12
Payer: OTHER GOVERNMENT

## 2024-09-12 ENCOUNTER — PREP FOR SURGERY (OUTPATIENT)
Dept: OTHER | Facility: HOSPITAL | Age: 59
End: 2024-09-12
Payer: OTHER GOVERNMENT

## 2024-09-12 VITALS
DIASTOLIC BLOOD PRESSURE: 77 MMHG | BODY MASS INDEX: 42.66 KG/M2 | HEIGHT: 72 IN | HEART RATE: 100 BPM | WEIGHT: 315 LBS | SYSTOLIC BLOOD PRESSURE: 132 MMHG

## 2024-09-12 DIAGNOSIS — I48.0 PAROXYSMAL ATRIAL FIBRILLATION: Primary | ICD-10-CM

## 2024-09-12 PROCEDURE — 99214 OFFICE O/P EST MOD 30 MIN: CPT

## 2024-09-12 RX ORDER — SODIUM CHLORIDE 0.9 % (FLUSH) 0.9 %
10 SYRINGE (ML) INJECTION EVERY 12 HOURS SCHEDULED
OUTPATIENT
Start: 2024-09-12

## 2024-09-12 RX ORDER — SODIUM CHLORIDE 0.9 % (FLUSH) 0.9 %
10 SYRINGE (ML) INJECTION AS NEEDED
OUTPATIENT
Start: 2024-09-12

## 2024-09-12 RX ORDER — SODIUM CHLORIDE 9 MG/ML
40 INJECTION, SOLUTION INTRAVENOUS AS NEEDED
OUTPATIENT
Start: 2024-09-12

## 2024-09-12 NOTE — TELEPHONE ENCOUNTER
Per FRITZ Barnett: Notify patient. Spoke to Dr. Christine. He would like to start patient on Eliquis 5 mg twice daily now. He will need to remain on this for 4 weeks postprocedure. He will also need a transesophageal echocardiogram at the time of his cardioversion. Orders will be placed. Please have patient come into office Monday for EKG only.

## 2024-09-13 NOTE — TELEPHONE ENCOUNTER
JAMIL patient. Went over recommendations. Patient verbalized understanding and appreciation. Sent prescription and free 30 day voucher. Patient verbalized understanding to come in Monday for EKG

## 2024-09-16 ENCOUNTER — PREP FOR SURGERY (OUTPATIENT)
Dept: OTHER | Facility: HOSPITAL | Age: 59
End: 2024-09-16
Payer: OTHER GOVERNMENT

## 2024-09-16 ENCOUNTER — CLINICAL SUPPORT (OUTPATIENT)
Dept: CARDIOLOGY | Facility: CLINIC | Age: 59
End: 2024-09-16
Payer: OTHER GOVERNMENT

## 2024-09-16 DIAGNOSIS — I48.0 PAROXYSMAL ATRIAL FIBRILLATION: Primary | ICD-10-CM

## 2024-09-16 PROCEDURE — 93000 ELECTROCARDIOGRAM COMPLETE: CPT

## 2024-09-18 ENCOUNTER — TELEPHONE (OUTPATIENT)
Dept: CARDIOLOGY | Facility: CLINIC | Age: 59
End: 2024-09-18
Payer: OTHER GOVERNMENT

## 2024-09-18 ENCOUNTER — TREATMENT (OUTPATIENT)
Dept: PHYSICAL THERAPY | Facility: CLINIC | Age: 59
End: 2024-09-18
Payer: OTHER GOVERNMENT

## 2024-09-18 DIAGNOSIS — M23.52 CHRONIC INSTABILITY OF KNEE, LEFT KNEE: ICD-10-CM

## 2024-09-18 DIAGNOSIS — M62.461 GASTROCNEMIUS EQUINUS OF RIGHT LOWER EXTREMITY: Primary | ICD-10-CM

## 2024-09-20 ENCOUNTER — HOSPITAL ENCOUNTER (OUTPATIENT)
Dept: CARDIOLOGY | Facility: HOSPITAL | Age: 59
Discharge: HOME | End: 2024-09-20
Admitting: FAMILY MEDICINE
Payer: OTHER GOVERNMENT

## 2024-09-20 DIAGNOSIS — R00.0 TACHYCARDIA: ICD-10-CM

## 2024-09-20 DIAGNOSIS — I48.0 PAROXYSMAL ATRIAL FIBRILLATION: ICD-10-CM

## 2024-09-20 PROCEDURE — 93225 XTRNL ECG REC<48 HRS REC: CPT

## 2024-09-25 ENCOUNTER — HOSPITAL ENCOUNTER (OUTPATIENT)
Dept: CARDIOLOGY | Facility: HOSPITAL | Age: 59
Discharge: HOME | End: 2024-09-25
Payer: OTHER GOVERNMENT

## 2024-09-25 VITALS
HEIGHT: 72 IN | HEART RATE: 79 BPM | BODY MASS INDEX: 42.66 KG/M2 | SYSTOLIC BLOOD PRESSURE: 113 MMHG | WEIGHT: 315 LBS | RESPIRATION RATE: 15 BRPM | OXYGEN SATURATION: 95 % | DIASTOLIC BLOOD PRESSURE: 59 MMHG

## 2024-09-25 DIAGNOSIS — I48.0 PAROXYSMAL ATRIAL FIBRILLATION: ICD-10-CM

## 2024-09-25 PROCEDURE — 25010000002 DIPHENHYDRAMINE PER 50 MG: Performed by: INTERNAL MEDICINE

## 2024-09-25 PROCEDURE — 99152 MOD SED SAME PHYS/QHP 5/>YRS: CPT

## 2024-09-25 PROCEDURE — 93325 DOPPLER ECHO COLOR FLOW MAPG: CPT | Performed by: INTERNAL MEDICINE

## 2024-09-25 PROCEDURE — 25010000002 MIDAZOLAM PER 1MG: Performed by: INTERNAL MEDICINE

## 2024-09-25 PROCEDURE — 25010000002 MEPERIDINE PER 100 MG: Performed by: INTERNAL MEDICINE

## 2024-09-25 PROCEDURE — 93312 ECHO TRANSESOPHAGEAL: CPT | Performed by: INTERNAL MEDICINE

## 2024-09-25 PROCEDURE — 93325 DOPPLER ECHO COLOR FLOW MAPG: CPT

## 2024-09-25 PROCEDURE — 93312 ECHO TRANSESOPHAGEAL: CPT

## 2024-09-25 PROCEDURE — 99153 MOD SED SAME PHYS/QHP EA: CPT

## 2024-09-25 PROCEDURE — 92960 CARDIOVERSION ELECTRIC EXT: CPT

## 2024-09-25 PROCEDURE — 93320 DOPPLER ECHO COMPLETE: CPT | Performed by: INTERNAL MEDICINE

## 2024-09-25 PROCEDURE — 93320 DOPPLER ECHO COMPLETE: CPT

## 2024-09-25 RX ORDER — MEPERIDINE HYDROCHLORIDE 25 MG/ML
INJECTION INTRAMUSCULAR; INTRAVENOUS; SUBCUTANEOUS
Status: COMPLETED | OUTPATIENT
Start: 2024-09-25 | End: 2024-09-25

## 2024-09-25 RX ORDER — PROPAFENONE HYDROCHLORIDE 300 MG/1
150 TABLET, COATED ORAL DAILY PRN
Qty: 20 TABLET | Refills: 6 | Status: SHIPPED | OUTPATIENT
Start: 2024-09-25

## 2024-09-25 RX ORDER — MIDAZOLAM HYDROCHLORIDE 2 MG/2ML
INJECTION, SOLUTION INTRAMUSCULAR; INTRAVENOUS
Status: COMPLETED | OUTPATIENT
Start: 2024-09-25 | End: 2024-09-25

## 2024-09-25 RX ORDER — MIDAZOLAM HYDROCHLORIDE 2 MG/2ML
INJECTION, SOLUTION INTRAMUSCULAR; INTRAVENOUS
Status: DISPENSED
Start: 2024-09-25 | End: 2024-09-25

## 2024-09-25 RX ORDER — DIPHENHYDRAMINE HYDROCHLORIDE 50 MG/ML
INJECTION INTRAMUSCULAR; INTRAVENOUS
Status: DISPENSED
Start: 2024-09-25 | End: 2024-09-25

## 2024-09-25 RX ORDER — MEPERIDINE HYDROCHLORIDE 25 MG/ML
INJECTION INTRAMUSCULAR; INTRAVENOUS; SUBCUTANEOUS
Status: DISPENSED
Start: 2024-09-25 | End: 2024-09-25

## 2024-09-25 RX ORDER — MEPERIDINE HYDROCHLORIDE 25 MG/ML
INJECTION INTRAMUSCULAR; INTRAVENOUS; SUBCUTANEOUS
Status: DISCONTINUED
Start: 2024-09-25 | End: 2024-09-25 | Stop reason: WASHOUT

## 2024-09-25 RX ORDER — DIPHENHYDRAMINE HYDROCHLORIDE 50 MG/ML
INJECTION INTRAMUSCULAR; INTRAVENOUS
Status: COMPLETED | OUTPATIENT
Start: 2024-09-25 | End: 2024-09-25

## 2024-09-25 RX ADMIN — MIDAZOLAM HYDROCHLORIDE 2 MG: 1 INJECTION, SOLUTION INTRAMUSCULAR; INTRAVENOUS at 10:28

## 2024-09-25 RX ADMIN — MEPERIDINE HYDROCHLORIDE 25 MG: 25 INJECTION INTRAMUSCULAR; INTRAVENOUS; SUBCUTANEOUS at 10:19

## 2024-09-25 RX ADMIN — MIDAZOLAM HYDROCHLORIDE 4 MG: 1 INJECTION, SOLUTION INTRAMUSCULAR; INTRAVENOUS at 10:16

## 2024-09-25 RX ADMIN — APIXABAN 5 MG: 5 TABLET, FILM COATED ORAL at 08:59

## 2024-09-25 RX ADMIN — MIDAZOLAM HYDROCHLORIDE 2 MG: 1 INJECTION, SOLUTION INTRAMUSCULAR; INTRAVENOUS at 10:24

## 2024-09-25 RX ADMIN — TOPICAL ANESTHETIC 1 SPRAY: 200 SPRAY DENTAL; PERIODONTAL at 10:16

## 2024-09-25 RX ADMIN — MIDAZOLAM HYDROCHLORIDE 2 MG: 1 INJECTION, SOLUTION INTRAMUSCULAR; INTRAVENOUS at 10:35

## 2024-09-25 RX ADMIN — DIPHENHYDRAMINE HYDROCHLORIDE 50 MG: 50 INJECTION, SOLUTION INTRAMUSCULAR; INTRAVENOUS at 10:21

## 2024-09-25 RX ADMIN — MEPERIDINE HYDROCHLORIDE 25 MG: 25 INJECTION INTRAMUSCULAR; INTRAVENOUS; SUBCUTANEOUS at 10:26

## 2024-10-02 ENCOUNTER — TREATMENT (OUTPATIENT)
Dept: PHYSICAL THERAPY | Facility: CLINIC | Age: 59
End: 2024-10-02
Payer: OTHER GOVERNMENT

## 2024-10-02 DIAGNOSIS — M23.52 CHRONIC INSTABILITY OF KNEE, LEFT KNEE: ICD-10-CM

## 2024-10-02 DIAGNOSIS — M62.461 GASTROCNEMIUS EQUINUS OF RIGHT LOWER EXTREMITY: Primary | ICD-10-CM

## 2024-10-02 NOTE — PROGRESS NOTES
Physical Therapy Progress Note  [address] [facility], [address], [place] [address]      Patient: Mina Romero   : 1965  Referring practitioner: Kemar Stroud DO  Date of Initial Visit: Type: THERAPY  Noted: 2024  Today's Date: 10/2/2024  Patient seen for 4 sessions           Subjective   Mina Romero reports: right ankle pain 2/10 and left knee pain 3/10  Subjective Questionnaire: LEFS: 51/80=36% limitation improved from initial score 35/80=56% limitation       Objective          Active Range of Motion   Left Knee   Normal active range of motion    Right Ankle/Foot   Dorsiflexion (ke): 15 degrees   Plantar flexion: 40 degrees   Inversion: WFL  Eversion: 15 degrees     Passive Range of Motion     Right Ankle/Foot    Dorsiflexion (ke): 15 degrees   Inversion: WFL  Eversion: WFL    Strength/Myotome Testing     Left Knee   Flexion: 4+  Extension: 4+    Right Ankle/Foot   Dorsiflexion: 4  Plantar flexion: 4  Inversion: 4  Eversion: 4      See Exercise, Manual, and Modality Logs for complete treatment.       Assessment & Plan       Assessment  Impairments: activity intolerance, impaired physical strength and lacks appropriate home exercise program   Other impairment: decrease stair negotiation  Functional limitations: walking, uncomfortable because of pain, stooping and unable to perform repetitive tasks   Assessment details: Patient reports right ankle is doing well, but still having weakness in left knee and would benefit from continued skilled physical therapy to improve functional mobility.   Prognosis: good    Goals  Plan Goals: 1. The patient has limited ROM for the right ankle.   LTG 1: 12 weeks:  In order to allow the patient greater ease with forward, lateral, and diagonal mobility the patient will demonstrate improved ROM of the right ankle as follows:  20 degrees of dorsiflexion and 40 degrees of plantarflexion,   STATUS:  Met for plantarflexion  STG 1a: 6 weeks:  The patient will demonstrate improved  ROM of the right ankle as follows:  15 degrees of dorsiflexion,    STATUS:  Met      2. The patient has limited strength of the right ankle.   LTG 2: 12 weeks:  In order to provide greater joint stability of the right ankle the patient will demonstrate improved strength of the right ankle as follows:  4+/5 dorsiflexion, 4+/5 inversion, 4+/5 eversion, and 4+/5 plantar flexion.   STATUS:  progressing  STG 2a: 6 weeks:  The patient will demonstrate improved strength of the right ankle as follows:  4/5 dorsiflexion, 4/5 inversion, 4/5 eversion, and 4/5 plantar flexion.   STATUS: Met      3. Mobility: Walking/Moving Around Functional Limitation     LTG 3: 12 weeks:  The patient will demonstrate 25% limitation by achieving a score of 60/80 on the Lower Extremity Functional Scale.   STATUS: progressing  STG 3a: 6 weeks:  The patient will demonstrate 37% limitation by achieving a score of 50/80 on the Lower Extremity Functional Scale.     STATUS:  Met        Plan  Therapy options: will be seen for skilled therapy services  Planned modality interventions: cryotherapy and thermotherapy (hydrocollator packs)  Planned therapy interventions: manual therapy, soft tissue mobilization, strengthening, stretching, therapeutic activities, joint mobilization, home exercise program, gait training, functional ROM exercises, flexibility and balance/weight-bearing training  Frequency: 2x week  Duration in weeks: 8  Treatment plan discussed with: patient    Visit Diagnoses:    ICD-10-CM ICD-9-CM   1. Gastrocnemius equinus of right lower extremity  M62.461 728.85   2. Chronic instability of knee, left knee  M23.52 718.86       Progress per Plan of Care and Progress strengthening /stabilization /functional activity           Timed:  Manual Therapy:    8     mins  08587;  Therapeutic Exercise:    20     mins  82118;     Neuromuscular Vlad:        mins  34400;    Therapeutic Activity:     10     mins  60326;     Gait Training:           mins   72421;     Ultrasound:          mins  29870;    Electrical Stimulation:         mins  72897 ( );    Untimed:  Electrical Stimulation:         mins  93028 ( );  Mechanical Traction:         mins  55357;     Timed Treatment:   38   mins   Total Treatment:     38   mins  Ledy Kinney PT    Electronically singed 10/2/2024      [place] PT license: [id number]  Physical Therapist

## 2024-10-09 ENCOUNTER — TELEPHONE (OUTPATIENT)
Dept: PHYSICAL THERAPY | Facility: CLINIC | Age: 59
End: 2024-10-09

## 2024-10-09 NOTE — TELEPHONE ENCOUNTER
Caller: Mina Romero    Relationship: Self         What was the call regarding: [employer]    t.”

## 2024-10-16 ENCOUNTER — TREATMENT (OUTPATIENT)
Dept: PHYSICAL THERAPY | Facility: CLINIC | Age: 59
End: 2024-10-16
Payer: OTHER GOVERNMENT

## 2024-10-16 DIAGNOSIS — M23.52 CHRONIC INSTABILITY OF KNEE, LEFT KNEE: ICD-10-CM

## 2024-10-16 DIAGNOSIS — M62.461 GASTROCNEMIUS EQUINUS OF RIGHT LOWER EXTREMITY: Primary | ICD-10-CM

## 2024-10-16 NOTE — PROGRESS NOTES
"Physical Therapy Daily Treatment Note  [address] [facility], [address] [place] [address]        Patient: Mina Romero   : 1965  Diagnosis/ICD-10 Code:  Gastrocnemius equinus of right lower extremity [M62.461]  Referring practitioner: Kemar Stroud DO  Date of Initial Visit: No linked episodes  Today's Date: 10/16/2024  Patient seen for Visit count could not be calculated. Make sure you are using a visit which is associated with an episode. sessions             Subjective   Mina Romero reports having aggravated his back - \"Working in the yard\" this weekend.  He states that he had to \"Shovel\" dirt on Saturday and reports that his back has been tight and painful for the past 4 days.  He denies having any pain or new issues regarding his Right knee or ankle at this time.    Objective     + Tightness Bilateral Hamstring / Gastrocnemius/ and hip musculature     (Right tighter than left)      See Exercise and Manual Logs for complete treatment.       Assessment/Plan    Overall, decreased tolerance to therapy session today secondary to reports of increased back pain  after pt reported working in yard over the weekend.  Held all functional stepping activities as well as single leg strengthening activities today secondary to exacerbation of back symptoms.  Pt denied having any new issues or pain during or post session regarding his right knee and ankle.    Progress per Plan of Care- as tolerated.           Timed:  Manual Therapy:    10     mins  10564;  Therapeutic Exercise:    20     mins  25050;     Neuromuscular Vlad:    0    mins  30817;    Therapeutic Activity:     8     mins  78510;     Gait Trainin     mins  93196;     Ultrasound:     0     mins  20533;    Electrical Stimulation:    0     mins  44104;  Iontophoresis     0     mins  28160    Untimed:  Electrical Stimulation:    0     mins  20016 ( );  Mechanical Traction:    0     mins  82850;   Fluidotherapy     0     mins  44664  Hot pack     00 "     mins  90679  Cold pack     0     mins  05083    Timed Treatment:   38   mins   Total Treatment:     38   mins        Nanda Rodríguez PTA  Physical Therapist Assistant

## 2024-10-17 ENCOUNTER — OFFICE VISIT (OUTPATIENT)
Dept: CARDIOLOGY | Facility: CLINIC | Age: 59
End: 2024-10-17
Payer: OTHER GOVERNMENT

## 2024-10-17 VITALS
HEART RATE: 61 BPM | WEIGHT: 315 LBS | SYSTOLIC BLOOD PRESSURE: 145 MMHG | DIASTOLIC BLOOD PRESSURE: 82 MMHG | HEIGHT: 72 IN | BODY MASS INDEX: 42.66 KG/M2

## 2024-10-17 DIAGNOSIS — R60.0 BILATERAL LOWER EXTREMITY EDEMA: ICD-10-CM

## 2024-10-17 DIAGNOSIS — E78.2 MIXED HYPERLIPIDEMIA: ICD-10-CM

## 2024-10-17 DIAGNOSIS — I48.0 PAROXYSMAL ATRIAL FIBRILLATION: Primary | ICD-10-CM

## 2024-10-17 PROCEDURE — 93000 ELECTROCARDIOGRAM COMPLETE: CPT

## 2024-10-17 PROCEDURE — 99214 OFFICE O/P EST MOD 30 MIN: CPT

## 2024-10-17 RX ORDER — FUROSEMIDE 20 MG
TABLET ORAL
Qty: 90 TABLET | OUTPATIENT
Start: 2024-10-17

## 2024-10-17 RX ORDER — FUROSEMIDE 20 MG
20 TABLET ORAL DAILY PRN
Qty: 30 TABLET | Refills: 3 | Status: SHIPPED | OUTPATIENT
Start: 2024-10-17

## 2024-10-17 NOTE — ASSESSMENT & PLAN NOTE
1.  Status post cardioversion on September 25, 2024 with Dr. Christine.  Doing well, maintaining normal sinus rhythm on EKG today.  He will be continued on anticoagulation for 6 weeks postprocedure.  This can be discontinued mid November.  This was discussed with the patient today and he was agreeable.  In the meantime he has propafenone to be used as needed.

## 2024-10-17 NOTE — PROGRESS NOTES
Chief Complaint  Follow-up (F/U post cardioversion with EKG. )    Subjective        History of Present Illness  Mina Romero presents to [facility] CARDIOLOG[facility] for follow up.   Patient is a 59-year-old male with past medical history outlined below, significant for paroxysmal atrial fibrillation and hyperlipidemia who presents for follow-up on recent MAGDIEL cardioversion on 2024 with Dr. Christine. He is doing well since then.  He notes no palpitations.  He denies any chest pain or discomfort, shortness of breath.  He has bilateral lower extremity edema.  He denies any dizziness or lightheadedness.    Past Medical History:   Diagnosis Date    A-fib     ASYMPTOMATIC- SEE'S DR SCHULTZ. DENIED CP/SOB    ADHD     Allergic rhinitis 2019    Allergies     Anxiety     Arthritis     Asthma     NO INHALERS    COVID-19 2021    Emotional depression     Head injury     X3 IN [place]Q- NO RESIDUAL    High blood pressure     High cholesterol     Sinus trouble        ALLERGY  Allergies   Allergen Reactions    Latex Rash        Past Surgical History:   Procedure Laterality Date    COLONOSCOPY      COLONOSCOPY N/A 10/14/2021    Procedure: COLONOSCOPY;  Surgeon: Riley Ryder MD;  Location: [facility] ENDOSCOPY;  Service: Gastroenterology;  Laterality: N/A;  DIVERTICULOSIS, HEMORRHOIDS    INGUINAL HERNIA REPAIR Bilateral     RECESSION GASTROCNEMIUS Right 2024    Procedure: RECESSION GASTROCNEMIUS;  Surgeon: Grant Aiken DPM;  Location: [facility] MAIN OR;  Service: Podiatry;  Laterality: Right;    SHOULDER SURGERY      TONSILLECTOMY          Social History     Socioeconomic History    Marital status:    Tobacco Use    Smoking status: Former     Current packs/day: 0.00     Average packs/day: 1 pack/day for 2.0 years (2.0 ttl pk-yrs)     Types: Cigarettes     Start date:      Quit date:      Years since quittin.8     Passive exposure: Past    Smokeless tobacco: Never     Tobacco comments:     APPROX 20 YEARS AGO    Vaping Use    Vaping status: Never Used   Substance and Sexual Activity    Alcohol use: Never    Drug use: Never    Sexual activity: Defer       Family History   Problem Relation Age of Onset    Diabetes Mother     Stroke Father     Heart disease Father     Diabetes Father     Anxiety disorder Father     Depression Father     Heart disease Other         AUNT/UNCLE    Anxiety disorder Sister         Current Outpatient Medications on File Prior to Visit   Medication Sig    apixaban (ELIQUIS) 5 MG tablet tablet Take 1 tablet by mouth 2 (Two) Times a Day.    aspirin 81 MG EC tablet Take 1 tablet by mouth Daily.    baclofen (LIORESAL) 10 MG tablet Take 1 tablet by mouth 3 (Three) Times a Day.    cetirizine (zyrTEC) 10 MG tablet 1 tablet.    diphenhydrAMINE (BENADRYL) 25 mg capsule Take 1 capsule by mouth At Night As Needed for Allergies.    DULoxetine (CYMBALTA) 60 MG capsule 1 capsule Daily.    fluticasone (FLONASE) 50 MCG/ACT nasal spray Administer 2 sprays into the nostril(s) as directed by provider Every Night.    ibuprofen (ADVIL,MOTRIN) 800 MG tablet 0.5 tablets Every 6 (Six) Hours As Needed for Moderate Pain.    mirtazapine (REMERON) 30 MG tablet Take  by mouth. 0.5 tab qd    montelukast (SINGULAIR) 10 MG tablet Take 1 tablet by mouth At Night As Needed.    prazosin (MINIPRESS) 2 MG capsule Take 1 capsule by mouth Every Night.    promethazine (PHENERGAN) 25 MG tablet Take 1 tablet by mouth Every 6 (Six) Hours As Needed for Nausea or Vomiting.    propafenone (RYTHMOL) 300 MG tablet Take 0.5 tablets by mouth Daily As Needed (if palpitations, take 2 pills, if still palp after 45 min can take additional 2 pills.).    risperiDONE (risperDAL) 4 MG tablet     rosuvastatin (CRESTOR) 10 MG tablet Take 1 tablet by mouth Every Night.    sildenafil (VIAGRA) 100 MG tablet Take 1 tablet by mouth As Needed.    urea (CARMOL) 20 % cream Apply as needed Twice daily    [DISCONTINUED]  "furosemide (Lasix) 20 MG tablet Take 1 tablet by mouth Daily As Needed (lower extremity edema).     No current facility-administered medications on file prior to visit.       Objective   Vitals:    10/17/24 1429   BP: 145/82   Pulse: 61   Weight: (!) 150 kg (330 lb)   Height: 182.9 cm (72\")       Physical Exam  Constitutional:       General: He is awake. He is not in acute distress.     Appearance: Normal appearance.   HENT:      Head: Normocephalic.      Nose: Nose normal. No congestion.   Eyes:      Extraocular Movements: Extraocular movements intact.      Conjunctiva/sclera: Conjunctivae normal.      Pupils: Pupils are equal, round, and reactive to light.   Neck:      Thyroid: No thyromegaly.      Vascular: No JVD.   Cardiovascular:      Rate and Rhythm: Normal rate and regular rhythm.      Chest Wall: PMI is not displaced.      Pulses: Normal pulses.      Heart sounds: Normal heart sounds, S1 normal and S2 normal. No murmur heard.     No friction rub. No gallop. No S3 or S4 sounds.   Pulmonary:      Effort: Pulmonary effort is normal.      Breath sounds: Normal breath sounds. No wheezing, rhonchi or rales.   Abdominal:      General: Bowel sounds are normal.      Palpations: Abdomen is soft.      Tenderness: There is no abdominal tenderness.   Musculoskeletal:      Cervical back: No tenderness.      Right lower leg: No edema.      Left lower leg: No edema.   Lymphadenopathy:      Cervical: No cervical adenopathy.   Skin:     General: Skin is warm and dry.      Capillary Refill: Capillary refill takes less than 2 seconds.      Coloration: Skin is not cyanotic.      Findings: No petechiae or rash.      Nails: There is no clubbing.   Neurological:      Mental Status: He is alert.   Psychiatric:         Mood and Affect: Mood normal.         Behavior: Behavior is cooperative.           Result Review     The following data was reviewed by FRITZ Ramos on 10/17/24.               Results for orders placed " during the hospital encounter of 09/25/24    Adult Transesophageal Echo (MAGDIEL) W/ Cont if Necessary Per Protocol    Interpretation Summary    Left ventricular ejection fraction appears to be 51 - 55%.    The left atrial cavity is mildly dilated.  Left atrial appendage shows no evidence of thrombus.    No significant valvular disease.    Thoracic aorta shows no significant atherosclerotic disease.      === Results for orders placed during the hospital encounter of 09/25/24 ===    [facility] CARD[facility]OLOGY DEPARTMENT    - Interpretation Summary -    Post cardioversion the patient displayed a sinus rhythm.    The cardioversion was successful.    Patient was cardioverted with 200 biphasic synchronized joules and went back into normal sinus rhythm.    The patient was sent out on propafenone as needed.  This is if he feels palpitations he will take 300 mg p.o.  If he continues to have palpitations after 45 minutes he will take another 300 mg p.o. his last episode of atrial fibrillation was 2 years ago.            ECG 12 Lead    Date/Time: 10/17/2024 3:06 PM  Performed by: Ericka Robertson APRN    Authorized by: Ericka Robertson APRN  Comparison: compared with previous ECG   Similar to previous ECG  Rhythm: sinus rhythm  Rate: normal  BPM: 74  QRS axis: normal    Clinical impression: normal ECG          Assessment & Plan  Paroxysmal atrial fibrillation  1.  Status post cardioversion on September 25, 2024 with Dr. Christine.  Doing well, maintaining normal sinus rhythm on EKG today.  He will be continued on anticoagulation for 6 weeks postprocedure.  This can be discontinued mid November.  This was discussed with the patient today and he was agreeable.  In the meantime he has propafenone to be used as needed.  Bilateral lower extremity edema  Patient has bilateral lower extremity edema on exam.  He was advised to take 20 mg of Lasix every day for the next week and then take Lasix 20 mg as needed for  swelling.  Will check a BMP in 1 week.  Mixed hyperlipidemia  Continue statin therapy.  [facility]      The medical services provided during this encounter are part of ongoing care related to this patient's single serious condition or complex condition.  Follow Up   No follow-ups on file.    Patient was given instructions and counseling regarding his condition or for health maintenance advice. Please see specific information pulled into the AVS if appropriate.     Ericka Robertson, FRITZ  10/17/24  15:03 EDT    Dictated Utilizing Dragon Dictation

## 2024-10-23 ENCOUNTER — TREATMENT (OUTPATIENT)
Dept: PHYSICAL THERAPY | Facility: CLINIC | Age: 59
End: 2024-10-23
Payer: OTHER GOVERNMENT

## 2024-10-23 DIAGNOSIS — M23.52 CHRONIC INSTABILITY OF KNEE, LEFT KNEE: ICD-10-CM

## 2024-10-23 DIAGNOSIS — M62.461 GASTROCNEMIUS EQUINUS OF RIGHT LOWER EXTREMITY: Primary | ICD-10-CM

## 2024-10-23 NOTE — PROGRESS NOTES
Physical Therapy Daily Treatment Note  [address] [facility], [address] [place] [address]        Patient: Mina Romero   : 1965  Diagnosis/ICD-10 Code:  Gastrocnemius equinus of right lower extremity [M62.461]  Referring practitioner: Kemar Stroud DO  Date of Initial Visit: Type: THERAPY  Noted: 2024  Today's Date: 10/23/2024  Patient seen for 6 sessions             Subjective     Mina Romero reports that he feels like he is in a good place regarding his left knee and his right ankle.  He denies having any pain upon arrival, primarily reporting general stiffness; which he reports managing with his stretches and exercises at home    Subjective Questionnaire: LEFS:    55/80  = 31% limitation improved from  Previous score: 51/80=36% limitation    improved from initial score 35/80=56% limitation     Objective     Active Range of Motion   Left Knee   Normal active range of motion     Right Ankle/Foot   Dorsiflexion (ke): 15 degrees   Plantar flexion: 40 degrees   Inversion: WFL  Eversion: 15 degrees      Passive Range of Motion      Right Ankle/Foot     Dorsiflexion (ke): 20 degrees  Plantar flexion:  WFL   Inversion: WFL  Eversion: WFL     Strength/Myotome Testing      Left Knee   Flexion: 5/5  Extension: 5/5     Right Ankle/Foot   Dorsiflexion: 4+/5  Plantar flexion: 4/5  Inversion: 4+/5  Eversion: 4 +/5    See Exercise Logs for complete treatment.        Goals  Plan Goals: 1. The patient has limited ROM for the right ankle.   LTG 1: 12 weeks:  In order to allow the patient greater ease with forward, lateral, and diagonal mobility the patient will demonstrate improved ROM of the right ankle as follows:  20 degrees of dorsiflexion and 40 degrees of plantarflexion,   STATUS:  MET  STG 1a: 6 weeks:  The patient will demonstrate improved ROM of the right ankle as follows:  15 degrees of dorsiflexion,    STATUS:  MET      2. The patient has limited strength of the right ankle.   LTG 2: 12 weeks:  In order to  provide greater joint stability of the right ankle the patient will demonstrate improved strength of the right ankle as follows:  4+/5 dorsiflexion, 4+/5 inversion, 4+/5 eversion, and 4+/5 plantar flexion.   STATUS:  PARTIALLY MET - for all except plantar flexion  STG 2a: 6 weeks:  The patient will demonstrate improved strength of the right ankle as follows:  4/5 dorsiflexion, 4/5 inversion, 4/5 eversion, and 4/5 plantar flexion.   STATUS: MET       3. Mobility: Walking/Moving Around Functional Limitation                  LTG 3: 12 weeks:  The patient will demonstrate 25% limitation by achieving a score of 60/80 on the Lower Extremity Functional Scale.   STATUS:  IMPROVED, BUT NOT MET (55/80= 31% limitation)  STG 3a: 6 weeks:  The patient will demonstrate 37% limitation by achieving a score of 50/80 on the Lower Extremity Functional Scale.     STATUS:  MET      Assessment/Plan    Pt tolerated therapy sessions well with progression of therapeutic exercises, CKC-Functional activities and Manual therapy.   He has made notable improvements in all areas  and now exhibits  Right Ankle/Foot  and his Left Knee ROM and  Strength that is now WFLs.    Positive response to trials of Manual stretching and Mobilizations for Right Foot-ankle; as evidenced by pt reporting decrease in  tightness and less pain overall.    He does continue to exhibit some persistent functional weakness in his right ankle and has is unable to perform single leg heel raise in standing.  He has been instructed in progressive HEP.  Pt has currently met all therapy goals except regarding Right Plantar Flexor strength and he partially met LTG 3 regarding LEFS score -which had improved, but was not met.           Plan:    Pt discharged from formal physical therapy this date.  He has been instructed to continue with HEP performance and to follow-up with MD as needed.  Pt verbalized good understanding and agreement with plan.           Timed:  Manual Therapy:     0     mins  40521;  Therapeutic Exercise:    15     mins  22597;     Neuromuscular Vlad:    0    mins  91194;    Therapeutic Activity:     15     mins  94467;     Gait Trainin     mins  57568;     Ultrasound:     0     mins  75405;    Electrical Stimulation:    0     mins  94152;  Iontophoresis     0     mins  59062    Untimed:  Electrical Stimulation:    0     mins  29211 ( );  Mechanical Traction:    0     mins  40716;   Fluidotherapy     0     mins  16713  Hot pack     0     mins  18743  Cold pack     0     mins  93320    Timed Treatment:   30   mins   Total Treatment:     30   mins        Nanda Rodríguez PTA  Physical Therapist Assistant

## 2024-12-03 ENCOUNTER — OFFICE VISIT (OUTPATIENT)
Dept: FAMILY MEDICINE CLINIC | Facility: CLINIC | Age: 59
End: 2024-12-03
Payer: OTHER GOVERNMENT

## 2024-12-03 VITALS
DIASTOLIC BLOOD PRESSURE: 80 MMHG | WEIGHT: 315 LBS | BODY MASS INDEX: 42.66 KG/M2 | HEIGHT: 72 IN | TEMPERATURE: 97.6 F | HEART RATE: 65 BPM | SYSTOLIC BLOOD PRESSURE: 138 MMHG | OXYGEN SATURATION: 97 %

## 2024-12-03 DIAGNOSIS — I48.0 PAROXYSMAL ATRIAL FIBRILLATION: ICD-10-CM

## 2024-12-03 DIAGNOSIS — M51.360 DEGENERATION OF INTERVERTEBRAL DISC OF LUMBAR REGION WITH DISCOGENIC BACK PAIN: Primary | ICD-10-CM

## 2024-12-03 DIAGNOSIS — G89.29 CHRONIC BILATERAL LOW BACK PAIN WITH RIGHT-SIDED SCIATICA: ICD-10-CM

## 2024-12-03 DIAGNOSIS — R60.0 BILATERAL LOWER EXTREMITY EDEMA: ICD-10-CM

## 2024-12-03 DIAGNOSIS — E78.2 MIXED HYPERLIPIDEMIA: ICD-10-CM

## 2024-12-03 DIAGNOSIS — Z51.81 MEDICATION MONITORING ENCOUNTER: ICD-10-CM

## 2024-12-03 DIAGNOSIS — Z12.5 SCREENING FOR PROSTATE CANCER: ICD-10-CM

## 2024-12-03 DIAGNOSIS — R03.0 ELEVATED BP WITHOUT DIAGNOSIS OF HYPERTENSION: ICD-10-CM

## 2024-12-03 DIAGNOSIS — I87.2 VENOUS INSUFFICIENCY: ICD-10-CM

## 2024-12-03 DIAGNOSIS — R73.09 ABNORMAL GLUCOSE: ICD-10-CM

## 2024-12-03 DIAGNOSIS — L60.0 INGROWN TOENAIL OF RIGHT FOOT: ICD-10-CM

## 2024-12-03 DIAGNOSIS — I48.11 LONGSTANDING PERSISTENT ATRIAL FIBRILLATION: ICD-10-CM

## 2024-12-03 DIAGNOSIS — M54.41 CHRONIC BILATERAL LOW BACK PAIN WITH RIGHT-SIDED SCIATICA: ICD-10-CM

## 2024-12-03 LAB
ANION GAP SERPL CALCULATED.3IONS-SCNC: 14 MMOL/L (ref 5–15)
BASOPHILS # BLD AUTO: 0.03 10*3/MM3 (ref 0–0.2)
BASOPHILS NFR BLD AUTO: 0.4 % (ref 0–1.5)
BUN SERPL-MCNC: 17 MG/DL (ref 6–20)
BUN/CREAT SERPL: 18.7 (ref 7–25)
CALCIUM SPEC-SCNC: 9 MG/DL (ref 8.6–10.5)
CHLORIDE SERPL-SCNC: 105 MMOL/L (ref 98–107)
CHOLEST SERPL-MCNC: 139 MG/DL (ref 0–200)
CO2 SERPL-SCNC: 21 MMOL/L (ref 22–29)
CREAT SERPL-MCNC: 0.91 MG/DL (ref 0.76–1.27)
DEPRECATED RDW RBC AUTO: 41.6 FL (ref 37–54)
EGFRCR SERPLBLD CKD-EPI 2021: 97.1 ML/MIN/1.73
EOSINOPHIL # BLD AUTO: 0.16 10*3/MM3 (ref 0–0.4)
EOSINOPHIL NFR BLD AUTO: 2.1 % (ref 0.3–6.2)
ERYTHROCYTE [DISTWIDTH] IN BLOOD BY AUTOMATED COUNT: 12.9 % (ref 12.3–15.4)
GLUCOSE SERPL-MCNC: 114 MG/DL (ref 65–99)
HBA1C MFR BLD: 5.3 % (ref 4.8–5.6)
HCT VFR BLD AUTO: 41.2 % (ref 37.5–51)
HDLC SERPL-MCNC: 34 MG/DL (ref 40–60)
HGB BLD-MCNC: 14.5 G/DL (ref 13–17.7)
IMM GRANULOCYTES # BLD AUTO: 0.02 10*3/MM3 (ref 0–0.05)
IMM GRANULOCYTES NFR BLD AUTO: 0.3 % (ref 0–0.5)
LDL/HDL RATIO NULL: ABNORMAL
LDLC SERPL CALC-MCNC: 29 MG/DL (ref 0–100)
LYMPHOCYTES # BLD AUTO: 1.71 10*3/MM3 (ref 0.7–3.1)
LYMPHOCYTES NFR BLD AUTO: 22.6 % (ref 19.6–45.3)
MCH RBC QN AUTO: 31.3 PG (ref 26.6–33)
MCHC RBC AUTO-ENTMCNC: 35.2 G/DL (ref 31.5–35.7)
MCV RBC AUTO: 89 FL (ref 79–97)
MONOCYTES # BLD AUTO: 0.8 10*3/MM3 (ref 0.1–0.9)
MONOCYTES NFR BLD AUTO: 10.6 % (ref 5–12)
NEUTROPHILS NFR BLD AUTO: 4.83 10*3/MM3 (ref 1.7–7)
NEUTROPHILS NFR BLD AUTO: 64 % (ref 42.7–76)
NRBC BLD AUTO-RTO: 0 /100 WBC (ref 0–0.2)
PLATELET # BLD AUTO: 211 10*3/MM3 (ref 140–450)
PMV BLD AUTO: 9 FL (ref 6–12)
POTASSIUM SERPL-SCNC: 3.9 MMOL/L (ref 3.5–5.2)
PSA SERPL-MCNC: 1.45 NG/ML (ref 0–4)
RBC # BLD AUTO: 4.63 10*6/MM3 (ref 4.14–5.8)
SODIUM SERPL-SCNC: 140 MMOL/L (ref 136–145)
TRIGL SERPL-MCNC: 560 MG/DL (ref 0–150)
VLDLC SERPL-MCNC: 76 MG/DL (ref 5–40)
WBC NRBC COR # BLD AUTO: 7.55 10*3/MM3 (ref 3.4–10.8)

## 2024-12-03 PROCEDURE — 99214 OFFICE O/P EST MOD 30 MIN: CPT | Performed by: FAMILY MEDICINE

## 2024-12-03 PROCEDURE — 83036 HEMOGLOBIN GLYCOSYLATED A1C: CPT | Performed by: FAMILY MEDICINE

## 2024-12-03 PROCEDURE — G0103 PSA SCREENING: HCPCS | Performed by: FAMILY MEDICINE

## 2024-12-03 PROCEDURE — 80061 LIPID PANEL: CPT | Performed by: FAMILY MEDICINE

## 2024-12-03 PROCEDURE — 80048 BASIC METABOLIC PNL TOTAL CA: CPT | Performed by: FAMILY MEDICINE

## 2024-12-03 PROCEDURE — 36415 COLL VENOUS BLD VENIPUNCTURE: CPT | Performed by: FAMILY MEDICINE

## 2024-12-03 PROCEDURE — 85025 COMPLETE CBC W/AUTO DIFF WBC: CPT | Performed by: FAMILY MEDICINE

## 2024-12-03 RX ORDER — FUROSEMIDE 20 MG/1
TABLET ORAL
Qty: 90 TABLET | Refills: 3 | Status: SHIPPED | OUTPATIENT
Start: 2024-12-03

## 2024-12-03 RX ORDER — POTASSIUM CHLORIDE 750 MG/1
10 CAPSULE, EXTENDED RELEASE ORAL DAILY PRN
Qty: 90 CAPSULE | Refills: 1 | Status: SHIPPED | OUTPATIENT
Start: 2024-12-03

## 2024-12-03 RX ORDER — CYCLOBENZAPRINE HCL 10 MG
10 TABLET ORAL 3 TIMES DAILY PRN
Qty: 90 TABLET | Refills: 3 | Status: SHIPPED | OUTPATIENT
Start: 2024-12-03

## 2024-12-03 NOTE — PROGRESS NOTES
Chief Complaint  Follow-up and Atrial Fibrillation    Subjective          Mina Romero presents to [facility]    History of Present Illness     History of Present Illness  The patient is a 59-year-old male who presents today for follow-up.    He reports overall good health and does not require any medication refills at this time. He mentions that he has not received his influenza vaccine for the current year.    He has been using Flexeril 10 mg for his low back pain and sciatica, which he finds beneficial.    He has been under the care of Ericka Robertson, a nurse practitioner from cardiology, who recently performed a cardioversion to address his atrial fibrillation. He reports feeling well post-procedure. He is currently on propafenone as a precautionary measure against recurrent atrial fibrillation. He has discontinued Eliquis after a 4-week course. He had a Holter monitor test, but it was incomplete due to the device detaching during his sleep. He is also taking low-dose aspirin (81 mg) as recommended by his cardiologist. He reports no issues with his blood pressure and is not on any antihypertensive medication.    He has been experiencing edema in his legs, which his cardiologist does not attribute to congestive heart failure. He has noticed skin eruptions on his legs, more so on the one that underwent surgery. He has been using Lasix intermittently, which he believes helps with the edema. He also wears compression stockings and uses a recliner to elevate his legs. He has been trying to lose weight and has reduced his salt intake, although he admits to consuming processed foods. He also reports severe calf cramps.    He has an ingrown toenail on the first digit of his right foot, which he has been managing by trimming and filing it down. He recalls having phenol applied to it in the past.         Current Outpatient Medications   Medication Instructions    aspirin 81 mg, Daily  "   cetirizine (ZYRTEC) 10 mg    cyclobenzaprine (FLEXERIL) 10 mg, Oral, 3 Times Daily PRN    diphenhydrAMINE (BENADRYL) 25 mg, Nightly PRN    DULoxetine (CYMBALTA) 60 mg, Daily    fluticasone (FLONASE) 50 MCG/ACT nasal spray 2 sprays, Nightly    furosemide (Lasix) 20 MG tablet Take 1-2 tablet PO daily prn leg swelling    ibuprofen (ADVIL,MOTRIN) 800 MG tablet 0.5 tablets Every 6 (Six) Hours As Needed for Moderate Pain.    mirtazapine (REMERON) 30 MG tablet Take  by mouth. 0.5 tab qd    montelukast (SINGULAIR) 10 mg, Nightly PRN    potassium chloride (MICRO-K) 10 MEQ CR capsule 10 mEq, Oral, Daily PRN    prazosin (MINIPRESS) 2 mg, Nightly    promethazine (PHENERGAN) 25 mg, Oral, Every 6 Hours PRN    propafenone (RYTHMOL) 150 mg, Oral, Daily PRN    risperiDONE (risperDAL) 4 MG tablet     rosuvastatin (CRESTOR) 10 mg, Nightly    sildenafil (VIAGRA) 100 mg, As Needed    urea (CARMOL) 20 % cream Apply as needed Twice daily       The following portions of the patient's history were reviewed and updated as appropriate: allergies, current medications, past family history, past medical history, past social history, past surgical history, and problem list.    Objective   Vital Signs:   /80   Pulse 65   Temp 97.6 °F (36.4 °C) (Oral)   Ht 182.9 cm (72\")   Wt (!) 149 kg (329 lb 8 oz)   SpO2 97%   BMI 44.69 kg/m²     BP Readings from Last 3 Encounters:   12/03/24 138/80   10/17/24 145/82   09/25/24 113/59     Wt Readings from Last 3 Encounters:   12/03/24 (!) 149 kg (329 lb 8 oz)   10/17/24 (!) 150 kg (330 lb)   09/25/24 (!) 150 kg (330 lb)           Physical Exam  Vitals reviewed.   Constitutional:       Appearance: Normal appearance.   HENT:      Head: Normocephalic and atraumatic.      Right Ear: External ear normal.      Left Ear: External ear normal.   Eyes:      Conjunctiva/sclera: Conjunctivae normal.   Cardiovascular:      Rate and Rhythm: Normal rate and regular rhythm.      Heart sounds: No murmur heard.     " No friction rub. No gallop.   Pulmonary:      Effort: Pulmonary effort is normal.      Breath sounds: Normal breath sounds. No wheezing or rhonchi.   Abdominal:      General: Bowel sounds are normal. There is no distension.      Palpations: Abdomen is soft.      Tenderness: There is no abdominal tenderness.   Musculoskeletal:      Right lower leg: Edema present.      Left lower leg: Edema present.   Skin:     Comments: Iron deposition noted in the anterior distal lower extremities bilaterally.    Ingrowing of the medial aspect of the first digit toenail of the right foot.  No evidence of cellulitis or abscess.  No erythema.   Neurological:      Mental Status: He is alert and oriented to person, place, and time.      Cranial Nerves: No cranial nerve deficit.   Psychiatric:         Mood and Affect: Mood and affect normal.         Behavior: Behavior normal.         Thought Content: Thought content normal.         Judgment: Judgment normal.            Result Review :   The following data was reviewed by: Kemar Stroud DO on 12/03/2024:           Lab Results   Component Value Date    SARSANTIGEN Not Detected 11/11/2022    COVID19 NOT DETECTED 10/28/2020    FLUAAG Not Detected 11/11/2022    FLUBAG Not Detected 11/11/2022       Results  Imaging  Lumbar spine x-ray from 2020 showed mild reversal of lordosis with mild degenerative changes.    Testing  Holter monitor report from September 27, 2024 showed persistent atrial fibrillation with well controlled average heart rate and no significant bradycardic events. Doppler test for DVT in right leg was negative.    Procedures        Assessment and Plan    Diagnoses and all orders for this visit:    1. Degeneration of intervertebral disc of lumbar region with discogenic back pain (Primary)    2. Chronic bilateral low back pain with right-sided sciatica    3. Longstanding persistent atrial fibrillation    4. Venous insufficiency    5. Bilateral lower extremity edema    6.  Mixed hyperlipidemia  -     Lipid Panel    7. Elevated BP without diagnosis of hypertension    8. Abnormal glucose  -     Hemoglobin A1c    9. Screening for prostate cancer  -     PSA Screen    10. Medication monitoring encounter  -     CBC & Differential    11. Ingrown toenail of right foot, great toe, medial aspect    12. Paroxysmal atrial fibrillation  -     Basic Metabolic Panel    Other orders  -     cyclobenzaprine (FLEXERIL) 10 MG tablet; Take 1 tablet by mouth 3 (Three) Times a Day As Needed for Muscle Spasms.  Dispense: 90 tablet; Refill: 3  -     furosemide (Lasix) 20 MG tablet; Take 1-2 tablet PO daily prn leg swelling  Dispense: 90 tablet; Refill: 3  -     potassium chloride (MICRO-K) 10 MEQ CR capsule; Take 1 capsule by mouth Daily As Needed (if taking extra dose of lasix).  Dispense: 90 capsule; Refill: 1        Assessment & Plan  1. Medication Management.  A prescription for Flexeril 10 mg will be sent to [facility] in [facility].    2. Atrial Fibrillation.  He is advised to continue with his current medication regimen, including propafenone for any recurrence of atrial fibrillation. He is no longer taking Eliquis as per cardiology's recommendation. A basic metabolic panel (BMP) will be ordered. [facility]    3. Right Lower Extremity Edema.  Weight loss is recommended. He is advised to get fitted for compression stockings at [facility]. Leg elevation and massage are suggested. A referral to a vascular specialist for venous mapping is planned. Lasix will be refilled, with instructions to take 1 to 2 tablets orally daily as needed. If a second tablet is taken, a potassium supplement should be taken on the same day.      4. Venous Stasis Ulcer.  No venous stasis ulcer noted today.  He may use Silvadene if needed.  Weight loss and compression stockings are recommended to help manage the condition.    5. Ingrown Toenail.  A separate appointment will be scheduled for  the removal of the ingrown toenail on the right foot on the medial aspect.    6. Health Maintenance.  A PSA level will be checked.    Follow-up  Return in 3 months for follow-up.       Medications Discontinued During This Encounter   Medication Reason    baclofen (LIORESAL) 10 MG tablet     apixaban (ELIQUIS) 5 MG tablet tablet     furosemide (Lasix) 20 MG tablet Reorder          Follow Up   Return in about 3 months (around 3/3/2025) for HLD.  Patient was given instructions and counseling regarding his condition or for health maintenance advice. Please see specific information pulled into the AVS if appropriate.     Patient or patient representative verbalized consent for the use of Ambient Listening during the visit with  Kemar Stroud DO for chart documentation. 12/3/2024  14:09 EST    Kemar Stroud DO  12/03/24  14:37 EST

## 2024-12-13 ENCOUNTER — PROCEDURE VISIT (OUTPATIENT)
Dept: FAMILY MEDICINE CLINIC | Facility: CLINIC | Age: 59
End: 2024-12-13
Payer: OTHER GOVERNMENT

## 2024-12-13 VITALS
DIASTOLIC BLOOD PRESSURE: 80 MMHG | TEMPERATURE: 97.9 F | HEART RATE: 71 BPM | HEIGHT: 72 IN | OXYGEN SATURATION: 95 % | WEIGHT: 315 LBS | SYSTOLIC BLOOD PRESSURE: 132 MMHG | BODY MASS INDEX: 42.66 KG/M2

## 2024-12-13 DIAGNOSIS — L60.0 INGROWN TOENAIL OF RIGHT FOOT: Primary | ICD-10-CM

## 2024-12-13 NOTE — PROGRESS NOTES
"Chief Complaint  Nail Problem (Right big toe, hang nail)    Subjective      Mina Romero is a 59 y.o. male who presents to [facility]     History of Present Illness  The patient presents for evaluation of bilateral ingrown toenails.    He reports a recurrent issue with his bilateral ingrown toenails, which he has been managing through filing and cutting. Despite these efforts, the problem persists. He experiences intermittent pain, particularly when the nail becomes red and inflamed. He also notes that the nail rubs against the side of his toe, causing it to appear pink and red. He describes a sensation of tenderness towards the top of the affected area. He has previously attempted to use acid as a treatment method, but the nail continues to grow back. He recalls that during a previous procedure, one side required 5 mg of anesthetic while the other needed 10 mg due to difficulty in achieving numbness.         Objective   Vital Signs:   Vitals:    12/13/24 1410   BP: 132/80   BP Location: Left arm   Patient Position: Sitting   Pulse: 71   Temp: 97.9 °F (36.6 °C)   TempSrc: Oral   SpO2: 95%   Weight: (!) 151 kg (332 lb 9.6 oz)   Height: 182.9 cm (72\")     Body mass index is 45.11 kg/m².    Wt Readings from Last 3 Encounters:   12/13/24 (!) 151 kg (332 lb 9.6 oz)   12/03/24 (!) 149 kg (329 lb 8 oz)   10/17/24 (!) 150 kg (330 lb)     BP Readings from Last 3 Encounters:   12/13/24 132/80   12/03/24 138/80   10/17/24 145/82       Health Maintenance   Topic Date Due    Pneumococcal Vaccine 0-64 (2 of 2 - PCV) 05/31/2018    ZOSTER VACCINE (2 of 3) 01/29/2019    ANNUAL PHYSICAL  Never done    COVID-19 Vaccine (1 - 2024-25 season) Never done    INFLUENZA VACCINE  03/31/2025 (Originally 7/1/2024)    BMI FOLLOWUP  07/09/2025    LIPID PANEL  12/03/2025    COLORECTAL CANCER SCREENING  10/14/2026    TDAP/TD VACCINES (2 - Td or Tdap) 05/31/2027    HEPATITIS C SCREENING  Completed       Physical " Exam  HENT:      Head: Normocephalic and atraumatic.      Nose: Nose normal.      Mouth/Throat:      Mouth: Mucous membranes are moist.   Eyes:      Extraocular Movements: Extraocular movements intact.      Conjunctiva/sclera: Conjunctivae normal.   Musculoskeletal:      Cervical back: Neck supple.   Skin:     General: Skin is warm and dry.      Comments: Hangnail seen on the right great toe medial side   Neurological:      General: No focal deficit present.      Mental Status: He is alert and oriented to person, place, and time.   Psychiatric:         Mood and Affect: Mood normal.         Behavior: Behavior normal.         Thought Content: Thought content normal.         Judgment: Judgment normal.          Physical Exam      Result Review :  The following data was reviewed by: Nathanael Amaya DO on 12/13/2024:    No Images in the past 120 days found..     Results         Partial nail removal    Date/Time: 12/13/2024 5:02 PM    Performed by: Nathanael Amaya DO  Authorized by: Nathanael Amaya DO  Location: right foot  Location details: right big toe  Anesthesia: digital block    Anesthesia:  Local Anesthetic: lidocaine 1% without epinephrine  Anesthetic total: 3 mL  Preparation: sterile field established and skin prepped with providone-iodine  Amount removed: partial  Side: medial  Wedge excision of skin of nail fold: no  Nail bed sutured: no  Nail matrix removed: partial  Patient tolerance: patient tolerated the procedure well with no immediate complications  Comments: After consent was given, right toe was cleaned with alcohol swab and injected with 1% lidocaine without epinephrine.  Total of 3 cc was given.  After anesthesia was achieved, the entire toe was cleaned with iodine swab fortunately removal was completed under sterile conditions and phenol x 3 was applied to the nail matrix for destruction.  This was then washed with alcohol.  The area was dressed with petrolatum and gauze.  Patient tolerated the  procedure well.  Minimal blood loss.                Diagnoses and all orders for this visit:    1. Ingrown toenail of right foot (Primary)    Other orders  -     Nail Removal         Assessment & Plan  1. Bilateral ingrown toenails.  A comprehensive discussion regarding the potential risks and benefits associated with the procedure was conducted. The procedure was performed under local anesthesia, with meticulous attention to sterility and bleeding control using a tourniquet. Post-procedure, he was advised to maintain the gauze dressing for a duration of 24 hours and to limit weight-bearing activities on the affected foot. He was also instructed to avoid soaking the foot in water, although exposure to running water is permissible. Supplies were provided for him to continue bandaging the area until it begins to heal. He was further advised to seek immediate medical attention should he observe any signs of infection such as redness, heat, or discharge that is white or yellow in color.    PROCEDURE  The patient underwent a procedure for bilateral ingrown toenails.              FOLLOW UP  Return for Next scheduled follow up.  Patient was given instructions and counseling regarding his condition or for health maintenance advice. Please see specific information pulled into the AVS if appropriate.     Patient or patient representative verbalized consent for the use of Ambient Listening during the visit with  Nathanael Amaya DO for chart documentation. 12/13/2024  17:05 ZITA Amaya DO  12/13/24  17:05 EST    CURRENT & DISCONTINUED MEDICATIONS  Current Outpatient Medications   Medication Instructions    aspirin 81 mg, Daily    cetirizine (ZYRTEC) 10 mg    cyclobenzaprine (FLEXERIL) 10 mg, Oral, 3 Times Daily PRN    diphenhydrAMINE (BENADRYL) 25 mg, Nightly PRN    DULoxetine (CYMBALTA) 60 mg, Daily    fluticasone (FLONASE) 50 MCG/ACT nasal spray 2 sprays, Nightly    furosemide (Lasix) 20 MG tablet Take 1-2 tablet  PO daily prn leg swelling    ibuprofen (ADVIL,MOTRIN) 800 MG tablet 0.5 tablets Every 6 (Six) Hours As Needed for Moderate Pain.    mirtazapine (REMERON) 30 MG tablet Take  by mouth. 0.5 tab qd    montelukast (SINGULAIR) 10 mg, Nightly PRN    potassium chloride (MICRO-K) 10 MEQ CR capsule 10 mEq, Oral, Daily PRN    prazosin (MINIPRESS) 2 mg, Nightly    promethazine (PHENERGAN) 25 mg, Oral, Every 6 Hours PRN    propafenone (RYTHMOL) 150 mg, Oral, Daily PRN    risperiDONE (risperDAL) 4 MG tablet     rosuvastatin (CRESTOR) 10 mg, Nightly    sildenafil (VIAGRA) 100 mg, As Needed    urea (CARMOL) 20 % cream Apply as needed Twice daily       There are no discontinued medications.

## 2025-01-23 ENCOUNTER — OFFICE VISIT (OUTPATIENT)
Dept: FAMILY MEDICINE CLINIC | Facility: CLINIC | Age: 60
End: 2025-01-23
Payer: OTHER GOVERNMENT

## 2025-01-23 VITALS
HEIGHT: 72 IN | WEIGHT: 315 LBS | BODY MASS INDEX: 42.66 KG/M2 | SYSTOLIC BLOOD PRESSURE: 130 MMHG | OXYGEN SATURATION: 99 % | TEMPERATURE: 97.9 F | HEART RATE: 67 BPM | DIASTOLIC BLOOD PRESSURE: 80 MMHG

## 2025-01-23 DIAGNOSIS — H69.91 EUSTACHIAN TUBE DYSFUNCTION, RIGHT: ICD-10-CM

## 2025-01-23 DIAGNOSIS — L60.0 INGROWN TOENAIL OF RIGHT FOOT: ICD-10-CM

## 2025-01-23 DIAGNOSIS — H92.01 RIGHT EAR PAIN: Primary | ICD-10-CM

## 2025-01-23 PROCEDURE — 99213 OFFICE O/P EST LOW 20 MIN: CPT | Performed by: STUDENT IN AN ORGANIZED HEALTH CARE EDUCATION/TRAINING PROGRAM

## 2025-01-23 NOTE — PROGRESS NOTES
"Chief Complaint  Pain (Earache right)    Subjective      Mina Romero is a 59 y.o. male who presents to [facility]     History of Present Illness  The patient presents for evaluation of right ear pain.    Right Ear Pain  - Experienced intermittent sharp, intense right ear pain for 1 month  - Initially attributed pain to barometric pressure changes  - Pain lasts a few minutes before subsiding  - No associated rhinorrhea, nasal congestion, otorrhea, sinus issues, or oral/pharyngeal discomfort  - Pain is localized deep within the ear  - History of severe earaches during childhood, last episode 40 years ago  - Uncertain about hearing loss due to constant tinnitus  - Does not see an otolaryngologist  - Valsalva maneuver has not provided relief    Temporomandibular Joint Disorder  - Reports recent difficulty in mastication  - Suspects temporomandibular joint disorder  - Extensive dental procedures and teeth clenching during dental injections  - Regular use of Motrin without significant relief    Supplemental information: His toe is doing well.    MEDICATIONS  Motrin       Objective   Vital Signs:   Vitals:    01/23/25 1313   BP: 130/80   BP Location: Left arm   Patient Position: Sitting   Pulse: 67   Temp: 97.9 °F (36.6 °C)   TempSrc: Oral   SpO2: 99%   Weight: (!) 149 kg (329 lb 8 oz)   Height: 182.9 cm (72\")     Body mass index is 44.69 kg/m².    Wt Readings from Last 3 Encounters:   01/23/25 (!) 149 kg (329 lb 8 oz)   12/13/24 (!) 151 kg (332 lb 9.6 oz)   12/03/24 (!) 149 kg (329 lb 8 oz)     BP Readings from Last 3 Encounters:   01/23/25 130/80   12/13/24 132/80   12/03/24 138/80       Health Maintenance   Topic Date Due    Pneumococcal Vaccine 0-64 (2 of 2 - PCV) 05/31/2018    ZOSTER VACCINE (2 of 3) 01/29/2019    ANNUAL PHYSICAL  Never done    COVID-19 Vaccine (1 - 2024-25 season) Never done    BMI FOLLOWUP  07/09/2025    LIPID PANEL  12/03/2025    COLORECTAL CANCER SCREENING  " 10/14/2026    TDAP/TD VACCINES (2 - Td or Tdap) 05/31/2027    HEPATITIS C SCREENING  Completed    INFLUENZA VACCINE  Completed       Physical Exam  HENT:      Head: Normocephalic and atraumatic.      Right Ear: Ear canal normal.      Left Ear: Ear canal normal.      Ears:      Comments: Otosclerosis of the right TM noted which is very minor.  Normal left TM     Nose: Nose normal.      Mouth/Throat:      Mouth: Mucous membranes are moist.   Eyes:      Extraocular Movements: Extraocular movements intact.      Conjunctiva/sclera: Conjunctivae normal.   Cardiovascular:      Rate and Rhythm: Normal rate and regular rhythm.      Heart sounds: No murmur heard.     No friction rub. No gallop.   Pulmonary:      Effort: No respiratory distress.      Breath sounds: No wheezing, rhonchi or rales.   Abdominal:      General: Abdomen is flat. There is no distension.   Musculoskeletal:         General: No swelling.      Cervical back: Neck supple.   Skin:     General: Skin is warm and dry.   Neurological:      General: No focal deficit present.      Mental Status: He is alert and oriented to person, place, and time.   Psychiatric:         Mood and Affect: Mood normal.         Behavior: Behavior normal.         Thought Content: Thought content normal.         Judgment: Judgment normal.          Physical Exam  Scarring on the right eardrum.    Result Review :  The following data was reviewed by: Nathanael Amaya DO on 01/23/2025:    No Images in the past 120 days found..     Results         Procedures          Diagnoses and all orders for this visit:    1. Right ear pain (Primary)    2. Eustachian tube dysfunction, right    3. Ingrown toenail of right foot         Assessment & Plan  1. Eustachian tube dysfunction.  Symptoms, including sharp ear pain and tinnitus, suggest eustachian tube dysfunction. Examination revealed scarring on the eardrum, likely from past ear infections. Advised exercises such as Valsalva maneuver, swallowing,  jaw thrusts, and head tilts to open the eustachian tube. Chewing gum recommended. If symptoms persist, consider ENT referral.    2.  Ingrown toenail of right foot  Patient reports much better pain with partial removal of ingrown toenail.              FOLLOW UP  Return for Next scheduled follow up.  Patient was given instructions and counseling regarding his condition or for health maintenance advice. Please see specific information pulled into the AVS if appropriate.     Patient or patient representative verbalized consent for the use of Ambient Listening during the visit with  Nathanael Amaya DO for chart documentation. 1/23/2025  16:24 EST    Nathanael Amaya DO  01/23/25  16:25 EST    CURRENT & DISCONTINUED MEDICATIONS  Current Outpatient Medications   Medication Instructions    aspirin 81 mg, Daily    cetirizine (ZYRTEC) 10 mg    cyclobenzaprine (FLEXERIL) 10 mg, Oral, 3 Times Daily PRN    diphenhydrAMINE (BENADRYL) 25 mg, Nightly PRN    DULoxetine (CYMBALTA) 60 mg, Daily    fluticasone (FLONASE) 50 MCG/ACT nasal spray 2 sprays, Nightly    furosemide (Lasix) 20 MG tablet Take 1-2 tablet PO daily prn leg swelling    ibuprofen (ADVIL,MOTRIN) 800 MG tablet 0.5 tablets Every 6 (Six) Hours As Needed for Moderate Pain.    mirtazapine (REMERON) 30 MG tablet Take  by mouth. 0.5 tab qd    montelukast (SINGULAIR) 10 mg, Nightly PRN    potassium chloride (MICRO-K) 10 MEQ CR capsule 10 mEq, Oral, Daily PRN    prazosin (MINIPRESS) 2 mg, Nightly    promethazine (PHENERGAN) 25 mg, Oral, Every 6 Hours PRN    propafenone (RYTHMOL) 150 mg, Oral, Daily PRN    risperiDONE (risperDAL) 4 MG tablet     rosuvastatin (CRESTOR) 10 mg, Nightly    sildenafil (VIAGRA) 100 mg, As Needed    urea (CARMOL) 20 % cream Apply as needed Twice daily       There are no discontinued medications.

## 2025-02-04 ENCOUNTER — OFFICE VISIT (OUTPATIENT)
Dept: CARDIOLOGY | Facility: CLINIC | Age: 60
End: 2025-02-04
Payer: OTHER GOVERNMENT

## 2025-02-04 VITALS
HEIGHT: 72 IN | BODY MASS INDEX: 42.66 KG/M2 | WEIGHT: 315 LBS | SYSTOLIC BLOOD PRESSURE: 132 MMHG | HEART RATE: 66 BPM | DIASTOLIC BLOOD PRESSURE: 85 MMHG

## 2025-02-04 DIAGNOSIS — E78.2 MIXED HYPERLIPIDEMIA: ICD-10-CM

## 2025-02-04 DIAGNOSIS — I48.0 PAROXYSMAL ATRIAL FIBRILLATION: Primary | ICD-10-CM

## 2025-02-04 DIAGNOSIS — R00.0 TACHYCARDIA: ICD-10-CM

## 2025-02-04 PROCEDURE — 99214 OFFICE O/P EST MOD 30 MIN: CPT

## 2025-02-04 RX ORDER — METOPROLOL SUCCINATE 25 MG/1
12.5 TABLET, EXTENDED RELEASE ORAL DAILY
Qty: 90 TABLET | Refills: 3 | Status: SHIPPED | OUTPATIENT
Start: 2025-02-04

## 2025-02-04 NOTE — ASSESSMENT & PLAN NOTE
History of paroxysmal atrial fibrillation status post MAGDIEL cardioversion in September.  Maintaining a normal rhythm on exam today.  Does not meet criteria for anticoagulation.  Continue propafenone as needed.  Patient has had no episodes of A-fib since cardioversion.

## 2025-02-04 NOTE — PROGRESS NOTES
Chief Complaint  Follow-up and Rapid Heart Rate    Subjective        History of Present Illness  Mina Romero presents to [facility] for follow up.   Patient is a 59-year-old male with past medical history outlined below, significant for paroxysmal atrial fibrillation and hyperlipidemia who presents for follow-up.  He had MAGDIEL cardioversion in September and has been in a normal rhythm since then.  He denies any paroxysms of atrial fibrillation.  He is generally very symptomatic with them.  He has been wearing an Apple Watch that has alerted him to some fast heart rates that lasted only a couple of seconds to minutes before they resolve on their own.  He reports he was sitting doing nothing when this occurred.  He denies any associated symptoms or palpitations.  He notes no chest pain or discomfort, dyspnea, edema or syncope.    Past Medical History:   Diagnosis Date    A-fib     ASYMPTOMATIC- SEE'S DR SCHULTZ. DENIED CP/SOB    ADHD     Allergic rhinitis 03/05/2019    Allergies     Anxiety     Arthritis     Asthma     NO INHALERS    COVID-19 01/20/2021    Emotional depression     Head injury     X3 IN [place]- NO RESIDUAL    High blood pressure     High cholesterol     Sinus trouble        ALLERGY  Allergies   Allergen Reactions    Latex Rash        Past Surgical History:   Procedure Laterality Date    COLONOSCOPY      COLONOSCOPY N/A 10/14/2021    Procedure: COLONOSCOPY;  Surgeon: Riley Ryder MD;  Location: [facility] ENDOSCOPY;  Service: Gastroenterology;  Laterality: N/A;  DIVERTICULOSIS, HEMORRHOIDS    INGUINAL HERNIA REPAIR Bilateral     RECESSION GASTROCNEMIUS Right 1/9/2024    Procedure: RECESSION GASTROCNEMIUS;  Surgeon: Grant Aiken DPM;  Location: [facility] MAIN OR;  Service: Podiatry;  Laterality: Right;    SHOULDER SURGERY  1995    TONSILLECTOMY  1976        Social History     Socioeconomic History    Marital status:    Tobacco Use    Smoking status: Former      Current packs/day: 0.00     Average packs/day: 1 pack/day for 2.0 years (2.0 ttl pk-yrs)     Types: Cigarettes     Start date:      Quit date:      Years since quittin.1     Passive exposure: Past    Smokeless tobacco: Never    Tobacco comments:     APPROX 20 YEARS AGO    Vaping Use    Vaping status: Never Used   Substance and Sexual Activity    Alcohol use: Never    Drug use: Never    Sexual activity: Defer       Family History   Problem Relation Age of Onset    Diabetes Mother     Stroke Father     Heart disease Father     Diabetes Father     Anxiety disorder Father     Depression Father     Heart disease Other         AUNT/UNCLE    Anxiety disorder Sister         Current Outpatient Medications on File Prior to Visit   Medication Sig    aspirin 81 MG EC tablet Take 1 tablet by mouth Daily.    cetirizine (zyrTEC) 10 MG tablet 1 tablet.    cyclobenzaprine (FLEXERIL) 10 MG tablet Take 1 tablet by mouth 3 (Three) Times a Day As Needed for Muscle Spasms.    diphenhydrAMINE (BENADRYL) 25 mg capsule Take 1 capsule by mouth At Night As Needed for Allergies.    DULoxetine (CYMBALTA) 60 MG capsule 1 capsule Daily.    fluticasone (FLONASE) 50 MCG/ACT nasal spray Administer 2 sprays into the nostril(s) as directed by provider Every Night.    furosemide (Lasix) 20 MG tablet Take 1-2 tablet PO daily prn leg swelling    ibuprofen (ADVIL,MOTRIN) 800 MG tablet 0.5 tablets Every 6 (Six) Hours As Needed for Moderate Pain.    mirtazapine (REMERON) 30 MG tablet Take  by mouth. 0.5 tab qd    montelukast (SINGULAIR) 10 MG tablet Take 1 tablet by mouth At Night As Needed.    potassium chloride (MICRO-K) 10 MEQ CR capsule Take 1 capsule by mouth Daily As Needed (if taking extra dose of lasix).    prazosin (MINIPRESS) 2 MG capsule Take 1 capsule by mouth Every Night.    promethazine (PHENERGAN) 25 MG tablet Take 1 tablet by mouth Every 6 (Six) Hours As Needed for Nausea or Vomiting.    propafenone (RYTHMOL) 300 MG tablet Take  "0.5 tablets by mouth Daily As Needed (if palpitations, take 2 pills, if still palp after 45 min can take additional 2 pills.).    risperiDONE (risperDAL) 4 MG tablet     rosuvastatin (CRESTOR) 10 MG tablet Take 1 tablet by mouth Every Night.    sildenafil (VIAGRA) 100 MG tablet Take 1 tablet by mouth As Needed.    urea (CARMOL) 20 % cream Apply as needed Twice daily     No current facility-administered medications on file prior to visit.       Objective   Vitals:    02/04/25 1322   BP: 132/85   Pulse: 66   Weight: (!) 150 kg (331 lb)   Height: 182.9 cm (72\")       Physical Exam  Constitutional:       General: He is awake. He is not in acute distress.     Appearance: Normal appearance.   HENT:      Head: Normocephalic.      Nose: Nose normal. No congestion.   Eyes:      Extraocular Movements: Extraocular movements intact.      Conjunctiva/sclera: Conjunctivae normal.      Pupils: Pupils are equal, round, and reactive to light.   Neck:      Thyroid: No thyromegaly.      Vascular: No JVD.   Cardiovascular:      Rate and Rhythm: Normal rate and regular rhythm.      Chest Wall: PMI is not displaced.      Pulses: Normal pulses.      Heart sounds: Normal heart sounds, S1 normal and S2 normal. No murmur heard.     No friction rub. No gallop. No S3 or S4 sounds.   Pulmonary:      Effort: Pulmonary effort is normal.      Breath sounds: Normal breath sounds. No wheezing, rhonchi or rales.   Abdominal:      General: Bowel sounds are normal.      Palpations: Abdomen is soft.      Tenderness: There is no abdominal tenderness.   Musculoskeletal:      Cervical back: No tenderness.      Right lower leg: No edema.      Left lower leg: No edema.   Lymphadenopathy:      Cervical: No cervical adenopathy.   Skin:     General: Skin is warm and dry.      Capillary Refill: Capillary refill takes less than 2 seconds.      Coloration: Skin is not cyanotic.      Findings: No petechiae or rash.      Nails: There is no clubbing.   Neurological: "      Mental Status: He is alert.   Psychiatric:         Mood and Affect: Mood normal.         Behavior: Behavior is cooperative.           Result Review     The following data was reviewed by FRITZ Ramos on 02/04/25.      CMP          12/3/2024    14:43   CMP   Glucose 114    BUN 17    Creatinine 0.91    EGFR 97.1    Sodium 140    Potassium 3.9    Chloride 105    Calcium 9.0    BUN/Creatinine Ratio 18.7    Anion Gap 14.0      CBC w/diff          12/3/2024    14:43   CBC w/Diff   WBC 7.55    RBC 4.63    Hemoglobin 14.5    Hematocrit 41.2    MCV 89.0    MCH 31.3    MCHC 35.2    RDW 12.9    Platelets 211    Neutrophil Rel % 64.0    Immature Granulocyte Rel % 0.3    Lymphocyte Rel % 22.6    Monocyte Rel % 10.6    Eosinophil Rel % 2.1    Basophil Rel % 0.4       Lipid Panel          12/3/2024    14:43   Lipid Panel   Total Cholesterol 139    Triglycerides 560    HDL Cholesterol 34    VLDL Cholesterol 76    LDL Cholesterol  29    LDL/HDL Ratio Unable to Calculate        Results for orders placed during the hospital encounter of 09/25/24    Adult Transesophageal Echo (MAGDIEL) W/ Cont if Necessary Per Protocol    Interpretation Summary    Left ventricular ejection fraction appears to be 51 - 55%.    The left atrial cavity is mildly dilated.  Left atrial appendage shows no evidence of thrombus.    No significant valvular disease.    Thoracic aorta shows no significant atherosclerotic disease.      === Results for orders placed during the hospital encounter of 09/25/24 ===    CARDIOVERSION EXTERNAL IN CARDIOLOGY DEPARTMENT    - Interpretation Summary -    Post cardioversion the patient displayed a sinus rhythm.    The cardioversion was successful.    Patient was cardioverted with 200 biphasic synchronized joules and went back into normal sinus rhythm.    The patient was sent out on propafenone as needed.  This is if he feels palpitations he will take 300 mg p.o.  If he continues to have palpitations after 45 minutes  he will take another 300 mg p.o. his last episode of atrial fibrillation was 2 years ago.          Procedures      Assessment & Plan  Paroxysmal atrial fibrillation  History of paroxysmal atrial fibrillation status post MAGDIEL cardioversion in September.  Maintaining a normal rhythm on exam today.  Does not meet criteria for anticoagulation.  Continue propafenone as needed.  Patient has had no episodes of A-fib since cardioversion.  Mixed hyperlipidemia  Continue the rosuvastatin.  Tachycardia  Patient reports some brief episodes of tachycardia occurring at rest.  Will add metoprolol 12.5 mg daily to try to mitigate these episodes.       The medical services provided during this encounter are part of ongoing care related to this patient's single serious condition or complex condition.  Follow Up   Return in about 6 months (around 8/4/2025) for With Dr. Christine.    Patient was given instructions and counseling regarding his condition or for health maintenance advice. Please see specific information pulled into the AVS if appropriate.     Ericka Robertson, APRN  02/04/25  13:39 EST    Dictated Utilizing Dragon Dictation

## 2025-03-18 ENCOUNTER — OFFICE VISIT (OUTPATIENT)
Dept: FAMILY MEDICINE CLINIC | Facility: CLINIC | Age: 60
End: 2025-03-18
Payer: OTHER GOVERNMENT

## 2025-03-18 VITALS
HEART RATE: 69 BPM | TEMPERATURE: 98.2 F | DIASTOLIC BLOOD PRESSURE: 84 MMHG | WEIGHT: 315 LBS | BODY MASS INDEX: 42.66 KG/M2 | SYSTOLIC BLOOD PRESSURE: 136 MMHG | HEIGHT: 72 IN | OXYGEN SATURATION: 96 %

## 2025-03-18 DIAGNOSIS — R42 POSTURAL DIZZINESS WITH PRESYNCOPE: Primary | ICD-10-CM

## 2025-03-18 DIAGNOSIS — R55 POSTURAL DIZZINESS WITH PRESYNCOPE: Primary | ICD-10-CM

## 2025-03-18 DIAGNOSIS — L60.0 INGROWN TOENAIL OF RIGHT FOOT: ICD-10-CM

## 2025-03-18 DIAGNOSIS — I48.11 LONGSTANDING PERSISTENT ATRIAL FIBRILLATION: ICD-10-CM

## 2025-03-18 DIAGNOSIS — L91.8 MULTIPLE ACQUIRED SKIN TAGS: ICD-10-CM

## 2025-03-18 DIAGNOSIS — L82.1 SEBORRHEIC KERATOSES: ICD-10-CM

## 2025-03-18 PROCEDURE — 99214 OFFICE O/P EST MOD 30 MIN: CPT | Performed by: FAMILY MEDICINE

## 2025-03-18 NOTE — PROGRESS NOTES
"Chief Complaint  Atrial fibrillation    Subjective          Mina Romero presents to [facility]    History of Present Illness     History of Present Illness  The patient is a 9-year-old male who presents today for follow-up of atrial fibrillation, presyncope, seborrheic keratoses, and ingrown toenail.    He was previously evaluated by Dr. Robertson on 02/04/2025, during which he was prescribed metoprolol and propafenone to manage his heart rate. These medications are taken as needed, rather than on a regular basis. He underwent a Holter monitor test prior to his second cardioversion. During the cardioversion procedure, his heart was examined, and no abnormalities were detected. Dr. Robertson did not believe his symptoms were cardiac-related. Currently, he is in sinus rhythm. His smartwatch has been alerting him of occasional episodes of tachycardia, but not atrial fibrillation.    He experiences lightheadedness, particularly when ascending stairs, which sometimes necessitates him to pause and steady himself. He has not experienced any syncope. There have been instances where he felt as though his head was \"swimming,\" but these are not consistent and can occur even when he is not climbing stairs. For example, he may experience this sensation when rising from the couch and walking to the kitchen. He initially suspected hypotension as the cause, but his blood pressure readings have been within normal limits. He also reports that his urine is typically clear in color.    He has been under the care of Dr. Emery for an issue with his right ear, which has since improved. He also had an ingrown toenail on his right foot, which was treated by Dr. Emery. A small piece of the nail was removed, which had been causing recurrent growth and bleeding. He has a history of similar issues with his left toenail, but it is currently asymptomatic.    He has noticed an increase in skin tags, particularly " "on his neck, with more on the right side than the left. Some of these skin tags are larger than others and can become irritated when they come into contact with his clothing. He spends a significant amount of time outdoors during the summer months. He has expressed interest in having some of the larger skin tags removed.    MEDICATIONS  Current: metoprolol, propafenone         Current Outpatient Medications   Medication Instructions    aspirin 81 mg, Daily    cetirizine (ZYRTEC) 10 mg    cyclobenzaprine (FLEXERIL) 10 mg, Oral, 3 Times Daily PRN    diphenhydrAMINE (BENADRYL) 25 mg, Nightly PRN    DULoxetine (CYMBALTA) 60 mg, Daily    fluticasone (FLONASE) 50 MCG/ACT nasal spray 2 sprays, Nightly    furosemide (Lasix) 20 MG tablet Take 1-2 tablet PO daily prn leg swelling    ibuprofen (ADVIL,MOTRIN) 800 MG tablet 0.5 tablets Every 6 (Six) Hours As Needed for Moderate Pain.    metoprolol succinate XL (TOPROL-XL) 12.5 mg, Oral, Daily    mirtazapine (REMERON) 30 MG tablet Take  by mouth. 0.5 tab qd    montelukast (SINGULAIR) 10 mg, Nightly PRN    potassium chloride (MICRO-K) 10 MEQ CR capsule 10 mEq, Oral, Daily PRN    prazosin (MINIPRESS) 2 mg, Nightly    promethazine (PHENERGAN) 25 mg, Oral, Every 6 Hours PRN    propafenone (RYTHMOL) 150 mg, Oral, Daily PRN    risperiDONE (risperDAL) 4 MG tablet     rosuvastatin (CRESTOR) 10 mg, Nightly    sildenafil (VIAGRA) 100 mg, As Needed    urea (CARMOL) 20 % cream Apply as needed Twice daily       The following portions of the patient's history were reviewed and updated as appropriate: allergies, current medications, past family history, past medical history, past social history, past surgical history, and problem list.    Objective   Vital Signs:   /84   Pulse 69   Temp 98.2 °F (36.8 °C) (Oral)   Ht 182.9 cm (72\")   Wt (!) 152 kg (334 lb 9.6 oz)   SpO2 96%   BMI 45.38 kg/m²     BP Readings from Last 3 Encounters:   03/18/25 136/84   02/04/25 132/85   01/23/25 130/80 "     Wt Readings from Last 3 Encounters:   03/18/25 (!) 152 kg (334 lb 9.6 oz)   02/04/25 (!) 150 kg (331 lb)   01/23/25 (!) 149 kg (329 lb 8 oz)           Physical Exam  Vitals reviewed.   Constitutional:       Appearance: Normal appearance.   HENT:      Head: Normocephalic and atraumatic.      Right Ear: External ear normal.      Left Ear: External ear normal.   Eyes:      Conjunctiva/sclera: Conjunctivae normal.   Cardiovascular:      Rate and Rhythm: Normal rate and regular rhythm.      Heart sounds: No murmur heard.     No friction rub. No gallop.      Comments: No carotid bruits noted.  Pulmonary:      Effort: Pulmonary effort is normal.      Breath sounds: Normal breath sounds. No wheezing or rhonchi.   Abdominal:      General: Bowel sounds are normal. There is no distension.      Palpations: Abdomen is soft.      Tenderness: There is no abdominal tenderness.   Skin:     Comments: 2 seborrheic keratoses noted on the right forearm.  They are well-circumscribed and are hyperkeratotic with some scale noted.  Patient has numerous skin tags noted in the right and left axillary region.   Neurological:      Mental Status: He is alert and oriented to person, place, and time.      Cranial Nerves: No cranial nerve deficit.   Psychiatric:         Mood and Affect: Mood and affect normal.         Behavior: Behavior normal.         Thought Content: Thought content normal.         Judgment: Judgment normal.            Result Review :   The following data was reviewed by: Kemar Stroud DO on 03/18/2025:  Common labs          12/3/2024    14:43   Common Labs   Glucose 114    BUN 17    Creatinine 0.91    Sodium 140    Potassium 3.9    Chloride 105    Calcium 9.0    WBC 7.55    Hemoglobin 14.5    Hematocrit 41.2    Platelets 211    Total Cholesterol 139    Triglycerides 560    HDL Cholesterol 34    LDL Cholesterol  29    Hemoglobin A1C 5.30    PSA 1.450             Lab Results   Component Value Date    SARSANTIGEN Not  Detected 11/11/2022    COVID19 NOT DETECTED 10/28/2020    FLUAAG Not Detected 11/11/2022    FLUBAG Not Detected 11/11/2022       Results      Procedures        Assessment and Plan    Diagnoses and all orders for this visit:    1. Postural dizziness with presyncope (Primary)  -     Duplex Carotid Ultrasound CAR; Future    2. Ingrown toenail of right foot    3. Seborrheic keratoses    4. Longstanding persistent atrial fibrillation    5. Multiple acquired skin tags        Assessment & Plan  1. Atrial Fibrillation.  He is currently in sinus rhythm and is not taking metoprolol or propafenone daily, only as needed for palpitations. He will continue to monitor his heart rate using his watch and report any irregularities.    2. Presyncope.  He experiences lightheadedness, particularly with positional changes such as standing up quickly. His blood pressure is within normal range. He is advised to rise slowly from a seated position and ensure adequate hydration, especially during warmer months. A carotid ultrasound will be ordered to assess cerebral blood flow.    3. Seborrheic Keratoses.  These are benign skin lesions resulting from sun exposure. They do not exhibit any concerning features. He is advised to leave them untreated unless they become bothersome. If they do cause discomfort, he will inform us, and we can either refer him to a dermatologist or perform cryotherapy.    4. Ingrown Toenail.  He had a previous ingrown toenail on the right foot, which was treated by Dr. Emery.    I discussed with patient the skin tags.  We discussed having him return for an appointment to have them removed, the ones that are bothering him.    Follow-up  The patient will follow up in 3 months.    PROCEDURE  The patient had an ingrown toenail on his right foot treated by Dr. Emery. A small piece of the nail was removed.       There are no discontinued medications.       Follow Up   Return in about 3 months (around 6/18/2025) for  postural dizziness.  Patient was given instructions and counseling regarding his condition or for health maintenance advice. Please see specific information pulled into the AVS if appropriate.     Patient or patient representative verbalized consent for the use of Ambient Listening during the visit with  Kemar Stroud DO for chart documentation. 3/18/2025  13:18 EDT    Kemar Stroud DO  03/18/25  13:37 EDT

## 2025-04-30 ENCOUNTER — PROCEDURE VISIT (OUTPATIENT)
Dept: FAMILY MEDICINE CLINIC | Facility: CLINIC | Age: 60
End: 2025-04-30
Payer: OTHER GOVERNMENT

## 2025-04-30 VITALS
SYSTOLIC BLOOD PRESSURE: 146 MMHG | HEART RATE: 66 BPM | OXYGEN SATURATION: 96 % | TEMPERATURE: 98.1 F | BODY MASS INDEX: 42.66 KG/M2 | HEIGHT: 72 IN | WEIGHT: 315 LBS | DIASTOLIC BLOOD PRESSURE: 88 MMHG

## 2025-04-30 DIAGNOSIS — L82.0 INFLAMED SEBORRHEIC KERATOSIS: ICD-10-CM

## 2025-04-30 DIAGNOSIS — L91.8 MULTIPLE ACQUIRED SKIN TAGS: Primary | ICD-10-CM

## 2025-04-30 DIAGNOSIS — Z51.81 MEDICATION MONITORING ENCOUNTER: ICD-10-CM

## 2025-04-30 RX ORDER — CYCLOBENZAPRINE HCL 10 MG
10 TABLET ORAL 3 TIMES DAILY PRN
Qty: 90 TABLET | Refills: 3 | Status: SHIPPED | OUTPATIENT
Start: 2025-04-30

## 2025-04-30 NOTE — PROGRESS NOTES
Chief Complaint  Skin tag removal    Subjective          Mina Romero presents to [facility]    Skin Problem       History of Present Illness  The patient is a 60-year-old male who presents today for skin tag removal.    Several skin tags around the axillary region are causing irritation and inconvenience. Additionally, a seborrheic keratosis has been identified and will be removed during the procedure. The skin tags have been marked for removal, and the patient has indicated specific ones that are bothersome.         Current Outpatient Medications   Medication Instructions    aspirin 81 mg, Daily    cetirizine (ZYRTEC) 10 mg    cyclobenzaprine (FLEXERIL) 10 mg, Oral, 3 Times Daily PRN    diphenhydrAMINE (BENADRYL) 25 mg, Nightly PRN    DULoxetine (CYMBALTA) 60 mg, Daily    fluticasone (FLONASE) 50 MCG/ACT nasal spray 2 sprays, Nightly    furosemide (Lasix) 20 MG tablet Take 1-2 tablet PO daily prn leg swelling    ibuprofen (ADVIL,MOTRIN) 800 MG tablet 0.5 tablets Every 6 (Six) Hours As Needed for Moderate Pain.    metoprolol succinate XL (TOPROL-XL) 12.5 mg, Oral, Daily    mirtazapine (REMERON) 30 MG tablet Take  by mouth. 0.5 tab qd    montelukast (SINGULAIR) 10 mg, Nightly PRN    potassium chloride (MICRO-K) 10 MEQ CR capsule 10 mEq, Oral, Daily PRN    prazosin (MINIPRESS) 2 mg, Nightly    promethazine (PHENERGAN) 25 mg, Oral, Every 6 Hours PRN    propafenone (RYTHMOL) 150 mg, Oral, Daily PRN    risperiDONE (risperDAL) 4 MG tablet     rosuvastatin (CRESTOR) 10 mg, Nightly    sildenafil (VIAGRA) 100 mg, As Needed    urea (CARMOL) 20 % cream Apply as needed Twice daily       The following portions of the patient's history were reviewed and updated as appropriate: allergies, current medications, past family history, past medical history, past social history, past surgical history, and problem list.    Objective   Vital Signs:   /88   Pulse 66   Temp 98.1 °F (36.7 °C) (Oral)  "  Ht 182.9 cm (72\")   Wt (!) 155 kg (341 lb 9.6 oz)   SpO2 96%   BMI 46.33 kg/m²     BP Readings from Last 3 Encounters:   04/30/25 146/88   03/18/25 136/84   02/04/25 132/85     Wt Readings from Last 3 Encounters:   04/30/25 (!) 155 kg (341 lb 9.6 oz)   03/18/25 (!) 152 kg (334 lb 9.6 oz)   02/04/25 (!) 150 kg (331 lb)           Physical Exam  Vitals reviewed.   Constitutional:       Appearance: Normal appearance.   HENT:      Head: Normocephalic and atraumatic.      Right Ear: External ear normal.      Left Ear: External ear normal.   Eyes:      Conjunctiva/sclera: Conjunctivae normal.   Cardiovascular:      Rate and Rhythm: Normal rate and regular rhythm.      Heart sounds: No murmur heard.     No friction rub. No gallop.   Pulmonary:      Effort: Pulmonary effort is normal.      Breath sounds: Normal breath sounds. No wheezing or rhonchi.   Abdominal:      General: Bowel sounds are normal. There is no distension.      Palpations: Abdomen is soft.      Tenderness: There is no abdominal tenderness.   Skin:     Comments: Patient has multiple skin tags particularly in the right axillary region.  He has marked for these with a surgical marker.  He marked another 1 for removal which upon further examination this appears consistent with a seborrheic keratosis.  This does cause irritation and discomfort for him.  The seborrheic keratosis measures approximately 5 x 5 mm.  It is well-circumscribed and raised as well as hyperpigmented.  Scaly in texture as well.   Neurological:      Mental Status: He is alert and oriented to person, place, and time.      Cranial Nerves: No cranial nerve deficit.   Psychiatric:         Mood and Affect: Mood and affect normal.         Behavior: Behavior normal.         Thought Content: Thought content normal.         Judgment: Judgment normal.            Result Review :   The following data was reviewed by: Kemar Stroud DO on 04/30/2025:  Common labs          12/3/2024    14:43 "   Common Labs   Glucose 114    BUN 17    Creatinine 0.91    Sodium 140    Potassium 3.9    Chloride 105    Calcium 9.0    WBC 7.55    Hemoglobin 14.5    Hematocrit 41.2    Platelets 211    Total Cholesterol 139    Triglycerides 560    HDL Cholesterol 34    LDL Cholesterol  29    Hemoglobin A1C 5.30    PSA 1.450             Lab Results   Component Value Date    SARSANTIGEN Not Detected 11/11/2022    COVID19 NOT DETECTED 10/28/2020    FLUAAG Not Detected 11/11/2022    FLUBAG Not Detected 11/11/2022       Results      Skin Tag Removal    Date/Time: 4/30/2025 8:49 AM    Performed by: Kemar Stroud DO  Authorized by: Kemar Stroud DO  Patient tolerance: patient tolerated the procedure well with no immediate complications  Comments: Patient provided verbal and written consent for treatment.  Timeout performed.  The 4 skin tags were anesthetized with 1% lidocaine with epinephrine until anesthesia was achieved.  The seborrheic keratosis in the right axillary region was also anesthetized with 1% lidocaine with epinephrine.  Afterwards each lesion was removed with a derma blade.  There is minimal bleeding that was controlled with a high temperature cautery pen.              Assessment and Plan    Diagnoses and all orders for this visit:    1. Multiple acquired skin tags (Primary)  -     Skin Tag Removal    2. Medication monitoring encounter    3. Inflamed seborrheic keratosis  -     Skin Tag Removal    Other orders  -     cyclobenzaprine (FLEXERIL) 10 MG tablet; Take 1 tablet by mouth 3 (Three) Times a Day As Needed for Muscle Spasms.  Dispense: 90 tablet; Refill: 3        Assessment & Plan  1. Skin tags.  - Multiple skin tags were present around the armpits.  - Electrocautery was used for removal, with lidocaine and epinephrine for numbing.  - Advised to keep the pressure dressing on for the rest of the day and remove it the following day. Instructed to shower with warm soapy water but avoid submerging the area in  water or swimming in a dirty pond.  - If any issues arise, the patient should contact the office.    2. Seborrheic keratosis.  - One lesion identified as seborrheic keratosis during the procedure.  - Removed using the same method as the skin tags.  - Informed that future lesions could be treated with liquid nitrogen if desired.  - Follow-up scheduled for 06/18/2025.    Post-Procedure:  - Tolerance Level: Tolerated the procedure well.  - Complications: None observed.  - Home Care Instructions: Advised to keep the dressing on until the next day, then remove and shower as usual with warm soapy water. Avoid submerging the area in water or swimming in potentially contaminated water.       Medications Discontinued During This Encounter   Medication Reason    cyclobenzaprine (FLEXERIL) 10 MG tablet Reorder          Follow Up   No follow-ups on file.  Patient was given instructions and counseling regarding his condition or for health maintenance advice. Please see specific information pulled into the AVS if appropriate.     Patient or patient representative verbalized consent for the use of Ambient Listening during the visit with  Kemar Stroud DO for chart documentation. 4/30/2025  08:52 EDT    Kemar Stroud DO  04/30/25  08:54 EDT

## 2025-05-05 ENCOUNTER — OFFICE VISIT (OUTPATIENT)
Dept: CARDIOLOGY | Facility: CLINIC | Age: 60
End: 2025-05-05
Payer: OTHER GOVERNMENT

## 2025-05-05 VITALS
DIASTOLIC BLOOD PRESSURE: 86 MMHG | BODY MASS INDEX: 42.66 KG/M2 | WEIGHT: 315 LBS | HEART RATE: 65 BPM | HEIGHT: 72 IN | SYSTOLIC BLOOD PRESSURE: 127 MMHG

## 2025-05-05 DIAGNOSIS — E78.2 MIXED HYPERLIPIDEMIA: ICD-10-CM

## 2025-05-05 DIAGNOSIS — I48.0 PAROXYSMAL ATRIAL FIBRILLATION: Primary | ICD-10-CM

## 2025-05-05 PROCEDURE — 99214 OFFICE O/P EST MOD 30 MIN: CPT

## 2025-05-05 NOTE — PROGRESS NOTES
Chief Complaint  Follow-up (3 month/), Atrial Fibrillation, Hypertension, and Hyperlipidemia    Subjective        History of Present Illness  Mina Romero presents to [facility] for follow up.   Patient is a 60-year-old male with past medical history outlined below, significant for paroxysmal atrial fibrillation and hyperlipidemia who presents for follow-up. He had MAGDIEL cardioversion in September 2024. He was maintaining sinus rhythm until march of this year when his watch alerted him that he was in afib. He had palpitations and dizziness with this episode. This episode lasted about a week. He did take the Propafenone as directed. He reports the afib stopped abruptly after a week.    He has no chest pain, dyspnea, edema or syncope.    Past Medical History:   Diagnosis Date    A-fib     ASYMPTOMATIC- SEE'S DR SCHULTZ. DENIED CP/SOB    ADHD     Allergic rhinitis 03/05/2019    Allergies     Anxiety     Arthritis     Asthma     NO INHALERS    COVID-19 01/20/2021    Emotional depression     Head injury     X3 IN [place]- NO RESIDUAL    High blood pressure     High cholesterol     Sinus trouble        ALLERGY  Allergies   Allergen Reactions    Latex Rash        Past Surgical History:   Procedure Laterality Date    COLONOSCOPY      COLONOSCOPY N/A 10/14/2021    Procedure: COLONOSCOPY;  Surgeon: Riley Ryder MD;  Location: [facility] END[facility];  Service: Gastroenterology;  Laterality: N/A;  DIVERTICULOSIS, HEMORRHOIDS    INGUINAL HERNIA REPAIR Bilateral     RECESSION GASTROCNEMIUS Right 1/9/2024    Procedure: RECESSION GASTROCNEMIUS;  Surgeon: Grant Aiken DPM;  Location: [facility] MAIN OR;  Service: Podiatry;  Laterality: Right;    SHOULDER SURGERY  1995    TONSILLECTOMY  1976        Social History     Socioeconomic History    Marital status:    Tobacco Use    Smoking status: Former     Current packs/day: 0.00     Average packs/day: 1 pack/day for 2.0 years (2.0 ttl pk-yrs)      Types: Cigarettes     Start date:      Quit date:      Years since quittin.3     Passive exposure: Past    Smokeless tobacco: Never    Tobacco comments:     APPROX 20 YEARS AGO    Vaping Use    Vaping status: Never Used   Substance and Sexual Activity    Alcohol use: Never    Drug use: Never    Sexual activity: Defer       Family History   Problem Relation Age of Onset    Diabetes Mother     Stroke Father     Heart disease Father     Diabetes Father     Anxiety disorder Father     Depression Father     Heart disease Other         AUNT/UNCLE    Anxiety disorder Sister         Current Outpatient Medications on File Prior to Visit   Medication Sig    aspirin 81 MG EC tablet Take 1 tablet by mouth Daily.    cetirizine (zyrTEC) 10 MG tablet 1 tablet.    cyclobenzaprine (FLEXERIL) 10 MG tablet Take 1 tablet by mouth 3 (Three) Times a Day As Needed for Muscle Spasms.    diphenhydrAMINE (BENADRYL) 25 mg capsule Take 1 capsule by mouth At Night As Needed for Allergies.    DULoxetine (CYMBALTA) 60 MG capsule 1 capsule Daily.    fluticasone (FLONASE) 50 MCG/ACT nasal spray Administer 2 sprays into the nostril(s) as directed by provider Every Night.    furosemide (Lasix) 20 MG tablet Take 1-2 tablet PO daily prn leg swelling    ibuprofen (ADVIL,MOTRIN) 800 MG tablet 0.5 tablets Every 6 (Six) Hours As Needed for Moderate Pain.    metoprolol succinate XL (TOPROL-XL) 25 MG 24 hr tablet Take 0.5 tablets by mouth Daily.    mirtazapine (REMERON) 30 MG tablet Take  by mouth. 0.5 tab qd    montelukast (SINGULAIR) 10 MG tablet Take 1 tablet by mouth At Night As Needed.    potassium chloride (MICRO-K) 10 MEQ CR capsule Take 1 capsule by mouth Daily As Needed (if taking extra dose of lasix).    prazosin (MINIPRESS) 2 MG capsule Take 1 capsule by mouth Every Night.    promethazine (PHENERGAN) 25 MG tablet Take 1 tablet by mouth Every 6 (Six) Hours As Needed for Nausea or Vomiting.    propafenone (RYTHMOL) 300 MG tablet Take  "0.5 tablets by mouth Daily As Needed (if palpitations, take 2 pills, if still palp after 45 min can take additional 2 pills.).    risperiDONE (risperDAL) 4 MG tablet     rosuvastatin (CRESTOR) 10 MG tablet Take 1 tablet by mouth Every Night.    sildenafil (VIAGRA) 100 MG tablet Take 1 tablet by mouth As Needed.    urea (CARMOL) 20 % cream Apply as needed Twice daily     No current facility-administered medications on file prior to visit.       Objective   Vitals:    05/05/25 1341   BP: 127/86   BP Location: Left arm   Patient Position: Sitting   Cuff Size: Large Adult   Pulse: 65   Weight: (!) 155 kg (342 lb 12.8 oz)   Height: 182.9 cm (72.01\")       Physical Exam  Constitutional:       General: He is awake. He is not in acute distress.     Appearance: Normal appearance.   HENT:      Head: Normocephalic.      Nose: Nose normal. No congestion.   Eyes:      Extraocular Movements: Extraocular movements intact.      Conjunctiva/sclera: Conjunctivae normal.      Pupils: Pupils are equal, round, and reactive to light.   Neck:      Thyroid: No thyromegaly.      Vascular: No JVD.   Cardiovascular:      Rate and Rhythm: Normal rate and regular rhythm.      Chest Wall: PMI is not displaced.      Pulses: Normal pulses.      Heart sounds: Normal heart sounds, S1 normal and S2 normal. No murmur heard.     No friction rub. No gallop. No S3 or S4 sounds.   Pulmonary:      Effort: Pulmonary effort is normal.      Breath sounds: Normal breath sounds. No wheezing, rhonchi or rales.   Abdominal:      General: Bowel sounds are normal.      Palpations: Abdomen is soft.      Tenderness: There is no abdominal tenderness.   Musculoskeletal:      Cervical back: No tenderness.      Right lower leg: No edema.      Left lower leg: No edema.   Lymphadenopathy:      Cervical: No cervical adenopathy.   Skin:     General: Skin is warm and dry.      Capillary Refill: Capillary refill takes less than 2 seconds.      Coloration: Skin is not cyanotic. "      Findings: No petechiae or rash.      Nails: There is no clubbing.   Neurological:      Mental Status: He is alert.   Psychiatric:         Mood and Affect: Mood normal.         Behavior: Behavior is cooperative.           Result Review     The following data was reviewed by FRITZ Ramos on 05/05/25.      CMP          12/3/2024    14:43   CMP   Glucose 114    BUN 17    Creatinine 0.91    EGFR 97.1    Sodium 140    Potassium 3.9    Chloride 105    Calcium 9.0    BUN/Creatinine Ratio 18.7    Anion Gap 14.0      CBC w/diff          12/3/2024    14:43   CBC w/Diff   WBC 7.55    RBC 4.63    Hemoglobin 14.5    Hematocrit 41.2    MCV 89.0    MCH 31.3    MCHC 35.2    RDW 12.9    Platelets 211    Neutrophil Rel % 64.0    Immature Granulocyte Rel % 0.3    Lymphocyte Rel % 22.6    Monocyte Rel % 10.6    Eosinophil Rel % 2.1    Basophil Rel % 0.4       Lipid Panel          12/3/2024    14:43   Lipid Panel   Total Cholesterol 139    Triglycerides 560    HDL Cholesterol 34    VLDL Cholesterol 76    LDL Cholesterol  29    LDL/HDL Ratio Unable to Calculate        Results for orders placed during the hospital encounter of 09/25/24    Adult Transesophageal Echo (MAGDIEL) W/ Cont if Necessary Per Protocol    Interpretation Summary    Left ventricular ejection fraction appears to be 51 - 55%.    The left atrial cavity is mildly dilated.  Left atrial appendage shows no evidence of thrombus.    No significant valvular disease.    Thoracic aorta shows no significant atherosclerotic disease.      === Results for orders placed during the hospital encounter of 09/25/24 ===    [facility] IN CARDIOLOGY DEPARTMENT    - Interpretation Summary -    Post cardioversion the patient displayed a sinus rhythm.    The cardioversion was successful.    Patient was cardioverted with 200 biphasic synchronized joules and went back into normal sinus rhythm.    The patient was sent out on propafenone as needed.  This is if he feels  palpitations he will take 300 mg p.o.  If he continues to have palpitations after 45 minutes he will take another 300 mg p.o. his last episode of atrial fibrillation was 2 years ago.          Procedures      Assessment & Plan  Paroxysmal atrial fibrillation  In a normal rhythm today. S/P previous cardioversion but still having paroxysms of afib according to his watch and his symptoms. [id number] score is 0. No need for anticoagulation at this time. Will refer to EP for consideration of ablation. Patient is advised to take Propafenone 300mg PRN palpitations, may repeat the dose after 45mins x 1 if palpitations persist.  Mixed hyperlipidemia   Continue statin therapy. LDL is at goal.                     Follow Up   Return in about 6 months (around 11/5/2025) for With Dr. Christine.    Patient was given instructions and counseling regarding his condition or for health maintenance advice. Please see specific information pulled into the AVS if appropriate.     Ericka Robertson, FRITZ  05/05/25  14:02 EDT    Dictated Utilizing Dragon Dictation

## 2025-05-05 NOTE — ASSESSMENT & PLAN NOTE
In a normal rhythm today. S/P previous cardioversion but still having paroxysms of afib according to his watch and his symptoms. [id number] score is 0. No need for anticoagulation at this time. Will refer to EP for consideration of ablation. Patient is advised to take Propafenone 300mg PRN palpitations, may repeat the dose after 45mins x 1 if palpitations persist.

## 2025-05-13 ENCOUNTER — HOSPITAL ENCOUNTER (OUTPATIENT)
Dept: CARDIOLOGY | Facility: HOSPITAL | Age: 60
Discharge: HOME | End: 2025-05-13
Admitting: FAMILY MEDICINE
Payer: OTHER GOVERNMENT

## 2025-05-13 DIAGNOSIS — R42 POSTURAL DIZZINESS WITH PRESYNCOPE: ICD-10-CM

## 2025-05-13 DIAGNOSIS — R55 POSTURAL DIZZINESS WITH PRESYNCOPE: ICD-10-CM

## 2025-05-13 LAB
BH CV XLRA MEAS LEFT CAROTID BULB EDV: 18.6 CM/SEC
BH CV XLRA MEAS LEFT CAROTID BULB PSV: 62.8 CM/SEC
BH CV XLRA MEAS LEFT DIST CCA EDV: 18.6 CM/SEC
BH CV XLRA MEAS LEFT DIST CCA PSV: 87.9 CM/SEC
BH CV XLRA MEAS LEFT DIST ICA EDV: 27.4 CM/SEC
BH CV XLRA MEAS LEFT DIST ICA PSV: 61.9 CM/SEC
BH CV XLRA MEAS LEFT ICA/CCA RATIO: 0.81
BH CV XLRA MEAS LEFT MID ICA EDV: 25.8 CM/SEC
BH CV XLRA MEAS LEFT MID ICA PSV: 66.3 CM/SEC
BH CV XLRA MEAS LEFT PROX CCA EDV: 16.3 CM/SEC
BH CV XLRA MEAS LEFT PROX CCA PSV: 148.9 CM/SEC
BH CV XLRA MEAS LEFT PROX ECA EDV: 8.5 CM/SEC
BH CV XLRA MEAS LEFT PROX ECA PSV: 102.1 CM/SEC
BH CV XLRA MEAS LEFT PROX ICA EDV: 24.7 CM/SEC
BH CV XLRA MEAS LEFT PROX ICA PSV: 71.4 CM/SEC
BH CV XLRA MEAS LEFT VERTEBRAL A EDV: 9.6 CM/SEC
BH CV XLRA MEAS LEFT VERTEBRAL A PSV: 29.2 CM/SEC
BH CV XLRA MEAS RIGHT CAROTID BULB EDV: 16.4 CM/SEC
BH CV XLRA MEAS RIGHT CAROTID BULB PSV: 54.2 CM/SEC
BH CV XLRA MEAS RIGHT DIST CCA EDV: 18.9 CM/SEC
BH CV XLRA MEAS RIGHT DIST CCA PSV: 77.5 CM/SEC
BH CV XLRA MEAS RIGHT DIST ICA EDV: 26.6 CM/SEC
BH CV XLRA MEAS RIGHT DIST ICA PSV: 72.1 CM/SEC
BH CV XLRA MEAS RIGHT ICA/CCA RATIO: 0.93
BH CV XLRA MEAS RIGHT MID ICA EDV: 21 CM/SEC
BH CV XLRA MEAS RIGHT MID ICA PSV: 46.9 CM/SEC
BH CV XLRA MEAS RIGHT PROX CCA EDV: 18 CM/SEC
BH CV XLRA MEAS RIGHT PROX CCA PSV: 89.9 CM/SEC
BH CV XLRA MEAS RIGHT PROX ECA EDV: 7.4 CM/SEC
BH CV XLRA MEAS RIGHT PROX ECA PSV: 104.2 CM/SEC
BH CV XLRA MEAS RIGHT PROX ICA EDV: 16.7 CM/SEC
BH CV XLRA MEAS RIGHT PROX ICA PSV: 50.2 CM/SEC
BH CV XLRA MEAS RIGHT VERTEBRAL A EDV: 8.2 CM/SEC
BH CV XLRA MEAS RIGHT VERTEBRAL A PSV: 24.7 CM/SEC
LEFT ARM BP: NORMAL MMHG
RIGHT ARM BP: NORMAL MMHG

## 2025-05-13 PROCEDURE — 93880 EXTRACRANIAL BILAT STUDY: CPT

## 2025-06-18 ENCOUNTER — OFFICE VISIT (OUTPATIENT)
Dept: FAMILY MEDICINE CLINIC | Facility: CLINIC | Age: 60
End: 2025-06-18
Payer: OTHER GOVERNMENT

## 2025-06-18 VITALS
TEMPERATURE: 98.1 F | DIASTOLIC BLOOD PRESSURE: 86 MMHG | BODY MASS INDEX: 42.66 KG/M2 | HEIGHT: 72 IN | WEIGHT: 315 LBS | SYSTOLIC BLOOD PRESSURE: 136 MMHG | HEART RATE: 67 BPM | OXYGEN SATURATION: 95 %

## 2025-06-18 DIAGNOSIS — E78.2 MIXED HYPERLIPIDEMIA: Primary | ICD-10-CM

## 2025-06-18 DIAGNOSIS — R55 POSTURAL DIZZINESS WITH PRESYNCOPE: ICD-10-CM

## 2025-06-18 DIAGNOSIS — I48.0 PAROXYSMAL ATRIAL FIBRILLATION: ICD-10-CM

## 2025-06-18 DIAGNOSIS — L91.8 MULTIPLE ACQUIRED SKIN TAGS: ICD-10-CM

## 2025-06-18 DIAGNOSIS — R42 POSTURAL DIZZINESS WITH PRESYNCOPE: ICD-10-CM

## 2025-06-18 DIAGNOSIS — Z51.81 MEDICATION MONITORING ENCOUNTER: ICD-10-CM

## 2025-06-18 RX ORDER — FENOFIBRATE 48 MG/1
48 TABLET, FILM COATED ORAL DAILY
COMMUNITY

## 2025-06-18 NOTE — PROGRESS NOTES
Chief Complaint  presyncope    Subjective          Mina JULIÁN Romero presents to [facility]    History of Present Illness     History of Present Illness  The patient is a 60-year-old male who presents for follow-up.    He has been experiencing persistent lightheadedness, which he describes as a spinning sensation rather than a feeling of impending fainting. This symptom occurs approximately 90% of the time when he stands up and occasionally when he is stationary and begins to move. He typically manages this by stopping and holding onto something for support. He reports no exacerbation of dizziness with rapid head movements or positional changes such as rolling over in bed. He has not undergone a brain MRI to date.    He has a history of atrial fibrillation, for which he underwent 2 cardioversions. Despite these interventions, he experienced a recurrence of symptoms, necessitating the use of propafenone. This treatment was effective, and he has not had any episodes of atrial fibrillation since 03/2025. During that month, he experienced a one-week period of atrial fibrillation, with 4 to 5 episodes per day, but has been symptom-free since then. He was referred to an electrophysiologist and has an upcoming appointment with Dr. Parker in [place].    He has expressed interest in having some larger skin tags removed from his neck, more so on the right side than the left. Some skin tags are larger than others and have become irritated. He spends a significant amount of time outdoors during the summer months. He prefers to have them cut off. He plans to schedule a separate appointment for this procedure. He had 4 skin tags removed on 04/30/2025.    [facility] triglycerides at 512 and HDL at 31. LDL was canceled. He was prescribed Tricor (fenofibrate) 48 mg for this condition. He is also taking Crestor 10 mg.    His ingrown toenail was treated by   "[facility] with phenol.         Current Outpatient Medications   Medication Instructions    aspirin 81 mg, Daily    cetirizine (ZYRTEC) 10 mg    cyclobenzaprine (FLEXERIL) 10 mg, Oral, 3 Times Daily PRN    diphenhydrAMINE (BENADRYL) 25 mg, Nightly PRN    DULoxetine (CYMBALTA) 60 mg, Daily    fenofibrate (TRICOR) 48 mg, Daily    fluticasone (FLONASE) 50 MCG/ACT nasal spray 2 sprays, Nightly    furosemide (Lasix) 20 MG tablet Take 1-2 tablet PO daily prn leg swelling    ibuprofen (ADVIL,MOTRIN) 800 MG tablet 0.5 tablets Every 6 (Six) Hours As Needed for Moderate Pain.    metoprolol succinate XL (TOPROL-XL) 12.5 mg, Oral, Daily    mirtazapine (REMERON) 30 MG tablet Take  by mouth. 0.5 tab qd    montelukast (SINGULAIR) 10 mg, Nightly PRN    potassium chloride (MICRO-K) 10 MEQ CR capsule 10 mEq, Oral, Daily PRN    prazosin (MINIPRESS) 2 mg, Nightly    promethazine (PHENERGAN) 25 mg, Oral, Every 6 Hours PRN    propafenone (RYTHMOL) 150 mg, Oral, Daily PRN    risperiDONE (risperDAL) 4 MG tablet     rosuvastatin (CRESTOR) 10 mg, Nightly    sildenafil (VIAGRA) 100 mg, As Needed    urea (CARMOL) 20 % cream Apply as needed Twice daily       The following portions of the patient's history were reviewed and updated as appropriate: allergies, current medications, past family history, past medical history, past social history, past surgical history, and problem list.    Objective   Vital Signs:   /86   Pulse 67   Temp 98.1 °F (36.7 °C) (Oral)   Ht 182.9 cm (72\")   Wt (!) 152 kg (335 lb 6.4 oz)   SpO2 95%   BMI 45.49 kg/m²     BP Readings from Last 3 Encounters:   06/18/25 136/86   05/05/25 127/86   04/30/25 146/88     Wt Readings from Last 3 Encounters:   06/18/25 (!) 152 kg (335 lb 6.4 oz)   05/05/25 (!) 155 kg (342 lb 12.8 oz)   04/30/25 (!) 155 kg (341 lb 9.6 oz)           Physical Exam  Vitals reviewed.   Constitutional:       Appearance: Normal appearance.   HENT:      Head: Normocephalic and atraumatic.      Right " Ear: External ear normal.      Left Ear: External ear normal.   Eyes:      Conjunctiva/sclera: Conjunctivae normal.   Cardiovascular:      Rate and Rhythm: Normal rate and regular rhythm.      Heart sounds: No murmur heard.     No friction rub. No gallop.   Pulmonary:      Effort: Pulmonary effort is normal.      Breath sounds: Normal breath sounds. No wheezing or rhonchi.   Abdominal:      General: Bowel sounds are normal. There is no distension.      Palpations: Abdomen is soft.      Tenderness: There is no abdominal tenderness.   Skin:     General: Skin is warm and dry.      Comments: 2 skin tags noted on the left axillary region and one on the right axillary region.    Neurological:      Mental Status: He is alert and oriented to person, place, and time.      Cranial Nerves: No cranial nerve deficit.   Psychiatric:         Mood and Affect: Mood and affect normal.         Behavior: Behavior normal.         Thought Content: Thought content normal.         Judgment: Judgment normal.            Result Review :   The following data was reviewed by: Kemar Stroud DO on 06/18/2025:  Common labs          12/3/2024    14:43   Common Labs   Glucose 114    BUN 17    Creatinine 0.91    Sodium 140    Potassium 3.9    Chloride 105    Calcium 9.0    WBC 7.55    Hemoglobin 14.5    Hematocrit 41.2    Platelets 211    Total Cholesterol 139    Triglycerides 560    HDL Cholesterol 34    LDL Cholesterol  29    Hemoglobin A1C 5.30    PSA 1.450             Lab Results   Component Value Date    SARSANTIGEN Not Detected 11/11/2022    COVID19 NOT DETECTED 10/28/2020    FLUAAG Not Detected 11/11/2022    FLUBAG Not Detected 11/11/2022       Results  Labs   - Triglycerides: 512 mg/dL   - HDL: 31 mg/dL    Imaging   - Vascular ultrasound of the neck: Right internal carotid artery demonstrates normal flow without evidence of hemodynamically significant stenosis. Left internal carotid artery demonstrates normal flow without evidence of  hemodynamically significant stenosis. No plaque visualized.    Procedures        Assessment and Plan    Diagnoses and all orders for this visit:    1. Mixed hyperlipidemia (Primary)    2. Postural dizziness with presyncope    3. Paroxysmal atrial fibrillation    4. Medication monitoring encounter    5. Multiple acquired skin tags        Assessment & Plan  1. Dizziness.  - The dizziness does not appear to be related to positional vertigo.  - The exact cause remains uncertain.  - A brain MRI will be considered after the consultation with the electrophysiologist.  - If symptoms worsen, he should inform the clinic.    2. Atrial fibrillation.  - He has a history of atrial fibrillation and has undergone 2 cardioversions.  - He experienced a recurrence of atrial fibrillation in 03/2025, which lasted about a week and was managed with propafenone.  - He has been referred to an electrocardiophysiologist, Dr. Parker, for further evaluation.  - Ejection fraction was noted to be 51-55%.    3. Skin tags.  - He has expressed interest in having some larger skin tags removed from his neck, more so on the right side than the left.  - Some skin tags are larger than others and have become irritated.  - He spends a significant amount of time outdoors during the summer months.  - He prefers to have them cut off and plans to schedule a separate appointment for this procedure.    4. Hypertriglyceridemia.  - [facility] triglycerides at 512 and HDL at 31.  - LDL was canceled.  - He is currently taking Crestor 10 mg and has been prescribed Tricor (fenofibrate) 48 mg for elevated triglycerides.       There are no discontinued medications.       Follow Up   Return in about 1 month (around 7/18/2025) for skin tag removal.  Patient was given instructions and counseling regarding his condition or for health maintenance advice. Please see specific information pulled into the AVS if appropriate.     Patient or  patient representative verbalized consent for the use of Ambient Listening during the visit with  Kemar Stroud DO for chart documentation. 6/18/2025  13:54 EDT    Kemar Stroud DO  06/18/25  13:55 EDT

## 2025-08-12 ENCOUNTER — PROCEDURE VISIT (OUTPATIENT)
Dept: FAMILY MEDICINE CLINIC | Facility: CLINIC | Age: 60
End: 2025-08-12
Payer: OTHER GOVERNMENT

## 2025-08-12 VITALS
WEIGHT: 315 LBS | HEIGHT: 72 IN | OXYGEN SATURATION: 96 % | TEMPERATURE: 97.7 F | BODY MASS INDEX: 42.66 KG/M2 | SYSTOLIC BLOOD PRESSURE: 136 MMHG | HEART RATE: 74 BPM | DIASTOLIC BLOOD PRESSURE: 84 MMHG

## 2025-08-12 DIAGNOSIS — Z12.83 SCREENING FOR SKIN CANCER: ICD-10-CM

## 2025-08-12 DIAGNOSIS — I48.0 PAROXYSMAL ATRIAL FIBRILLATION: ICD-10-CM

## 2025-08-12 DIAGNOSIS — L91.8 MULTIPLE ACQUIRED SKIN TAGS: Primary | ICD-10-CM

## 2025-08-26 DIAGNOSIS — I48.0 PAROXYSMAL ATRIAL FIBRILLATION: Primary | ICD-10-CM

## (undated) DEVICE — GAUZE,SPONGE,4"X4",16PLY,STRL,LF,10/TRAY: Brand: MEDLINE

## (undated) DEVICE — APPL CHLORAPREP HI/LITE 26ML ORNG

## (undated) DEVICE — GLV SURG BIOGEL LTX PF 7 1/2

## (undated) DEVICE — STANDARD HYPODERMIC NEEDLE,POLYPROPYLENE HUB: Brand: MONOJECT

## (undated) DEVICE — INTENDED FOR TISSUE SEPARATION, AND OTHER PROCEDURES THAT REQUIRE A SHARP SURGICAL BLADE TO PUNCTURE OR CUT.: Brand: BARD-PARKER ® CARBON RIB-BACK BLADES

## (undated) DEVICE — COLON KIT: Brand: MEDLINE INDUSTRIES, INC.

## (undated) DEVICE — DRAPE,TOWEL,LARGE,INVISISHIELD: Brand: MEDLINE

## (undated) DEVICE — SYR LL TP 10ML STRL

## (undated) DEVICE — BANDAGE,GAUZE,BULKEE II,4.5"X4.1YD,STRL: Brand: MEDLINE

## (undated) DEVICE — BNDG ELAS ECON W/CLIP 4IN 5YD LF STRL

## (undated) DEVICE — SUT ETHLN 3-0 FS118IN 663H

## (undated) DEVICE — PAD GRND REM POLYHESIVE A/ DISP

## (undated) DEVICE — SOL IRR NACL 0.9PCT BT 1000ML

## (undated) DEVICE — Device: Brand: DEFENDO AIR/WATER/SUCTION AND BIOPSY VALVE

## (undated) DEVICE — SUT MNCRYL PLS ANTIB UD 3/0 PS2 27IN

## (undated) DEVICE — STRIP,CLOSURE,WOUND,MEDI-STRIP,1/2X4: Brand: MEDLINE

## (undated) DEVICE — SOL IRRG H2O PL/BG 1000ML STRL

## (undated) DEVICE — DRESSING,GAUZE,XEROFORM,CURAD,1"X8",ST: Brand: CURAD

## (undated) DEVICE — EXTREMITY-LF: Brand: MEDLINE INDUSTRIES, INC.